# Patient Record
Sex: FEMALE | Race: WHITE | NOT HISPANIC OR LATINO | Employment: FULL TIME | URBAN - METROPOLITAN AREA
[De-identification: names, ages, dates, MRNs, and addresses within clinical notes are randomized per-mention and may not be internally consistent; named-entity substitution may affect disease eponyms.]

---

## 2017-10-13 ENCOUNTER — TRANSFERRED RECORDS (OUTPATIENT)
Dept: HEALTH INFORMATION MANAGEMENT | Facility: CLINIC | Age: 27
End: 2017-10-13

## 2017-10-13 LAB — PAP-ABSTRACT: NORMAL

## 2018-12-19 ENCOUNTER — TRANSFERRED RECORDS (OUTPATIENT)
Dept: HEALTH INFORMATION MANAGEMENT | Facility: CLINIC | Age: 28
End: 2018-12-19

## 2019-04-11 ENCOUNTER — OFFICE VISIT (OUTPATIENT)
Dept: FAMILY MEDICINE | Facility: CLINIC | Age: 29
End: 2019-04-11
Payer: COMMERCIAL

## 2019-04-11 VITALS
TEMPERATURE: 98.4 F | DIASTOLIC BLOOD PRESSURE: 65 MMHG | HEIGHT: 68 IN | HEART RATE: 68 BPM | SYSTOLIC BLOOD PRESSURE: 118 MMHG | OXYGEN SATURATION: 99 % | BODY MASS INDEX: 23.19 KG/M2 | WEIGHT: 153 LBS

## 2019-04-11 DIAGNOSIS — N91.2 AMENORRHEA: ICD-10-CM

## 2019-04-11 DIAGNOSIS — N92.6 MISSED MENSES: Primary | ICD-10-CM

## 2019-04-11 LAB — B-HCG SERPL-ACNC: ABNORMAL IU/L (ref 0–5)

## 2019-04-11 PROCEDURE — 36415 COLL VENOUS BLD VENIPUNCTURE: CPT | Performed by: FAMILY MEDICINE

## 2019-04-11 PROCEDURE — 99202 OFFICE O/P NEW SF 15 MIN: CPT | Performed by: FAMILY MEDICINE

## 2019-04-11 PROCEDURE — 84702 CHORIONIC GONADOTROPIN TEST: CPT | Performed by: FAMILY MEDICINE

## 2019-04-11 RX ORDER — OMEGA-3-ACID ETHYL ESTERS 1 G/1
2 CAPSULE, LIQUID FILLED ORAL
COMMUNITY
End: 2019-04-11

## 2019-04-11 RX ORDER — CHOLECALCIFEROL (VITAMIN D3) 50 MCG
1 TABLET ORAL DAILY
COMMUNITY
End: 2019-05-08

## 2019-04-11 RX ORDER — LEVOTHYROXINE SODIUM 50 UG/1
50 TABLET ORAL
COMMUNITY
Start: 2017-10-13 | End: 2019-09-23

## 2019-04-11 RX ORDER — LAMOTRIGINE 150 MG/1
100 TABLET ORAL
Status: ON HOLD | COMMUNITY
Start: 2013-07-19 | End: 2019-11-18

## 2019-04-11 RX ORDER — BUPROPION HYDROCHLORIDE 150 MG/1
TABLET ORAL
COMMUNITY
Start: 2013-07-19 | End: 2020-01-15

## 2019-04-11 RX ORDER — CLINDAMYCIN PHOSPHATE 10 MG/G
GEL TOPICAL
COMMUNITY
Start: 2017-09-01 | End: 2019-07-25

## 2019-04-11 RX ORDER — ALBUTEROL SULFATE 90 UG/1
2 AEROSOL, METERED RESPIRATORY (INHALATION)
COMMUNITY
Start: 2015-02-06 | End: 2020-03-11

## 2019-04-11 ASSESSMENT — MIFFLIN-ST. JEOR: SCORE: 1459.56

## 2019-04-11 NOTE — PROGRESS NOTES
"  SUBJECTIVE:   Mechelle Mata is a 28 year old female who presents to clinic today for the following   health issues:      Pregnancy Test  LMP: 2/8/19    She has had some spotting for 2 days   It was light, this was 3/26 and 3/27    Patient is having some cramping   Back pain   Nausea and bloating     This is first pregnancy   Patient is a behavioral health therapist       O: /65 (BP Location: Left arm, Patient Position: Sitting, Cuff Size: Adult Regular)   Pulse 68   Temp 98.4  F (36.9  C) (Oral)   Ht 1.715 m (5' 7.5\")   Wt 69.4 kg (153 lb)   LMP 02/08/2019 (Exact Date)   SpO2 99%   Breastfeeding? No   BMI 23.61 kg/m      No exam was done         ICD-10-CM    1. Missed menses N92.6 HCG Qual, Urine-  [facility] ([id number])   2. Amenorrhea N91.2 HCG quantitative pregnancy     Patient is concerned about dates of pregnancy and so would like quantitative test for hcg done     She has had some spotting for two days and none since.  Seems like this is not a concern     I will release labs to her tomorrow when they come.      She has concerns about taking fish oil during pregnancy and I told her I did not know if this was recommended     She asked about vitamin d and I told her this was ok as well as tylenol         Reviewed  and updated as needed this visit by clinical staff       Reviewed and updated as needed this visit by Provider                 "

## 2019-04-12 NOTE — RESULT ENCOUNTER NOTE
Result show that you are pregnant and probably between 9 and 12 weeks, but these numbers vary from individual to individual and do not necessarily correlate well with the week of pregnancy exactly.

## 2019-04-19 ENCOUNTER — HEALTH MAINTENANCE LETTER (OUTPATIENT)
Age: 29
End: 2019-04-19

## 2019-04-25 ENCOUNTER — PRENATAL OFFICE VISIT (OUTPATIENT)
Dept: NURSING | Facility: CLINIC | Age: 29
End: 2019-04-25
Payer: COMMERCIAL

## 2019-04-25 VITALS
HEART RATE: 68 BPM | WEIGHT: 156 LBS | HEIGHT: 68 IN | SYSTOLIC BLOOD PRESSURE: 120 MMHG | BODY MASS INDEX: 23.64 KG/M2 | DIASTOLIC BLOOD PRESSURE: 74 MMHG

## 2019-04-25 DIAGNOSIS — Z34.90 SUPERVISION OF NORMAL PREGNANCY: Primary | ICD-10-CM

## 2019-04-25 DIAGNOSIS — Z34.00 SUPERVISION OF NORMAL FIRST PREGNANCY: ICD-10-CM

## 2019-04-25 LAB
ABO + RH BLD: NORMAL
ABO + RH BLD: NORMAL
ALBUMIN UR-MCNC: NEGATIVE MG/DL
APPEARANCE UR: CLEAR
BILIRUB UR QL STRIP: NEGATIVE
BLD GP AB SCN SERPL QL: NORMAL
BLOOD BANK CMNT PATIENT-IMP: NORMAL
COLOR UR AUTO: YELLOW
ERYTHROCYTE [DISTWIDTH] IN BLOOD BY AUTOMATED COUNT: 13.5 % (ref 10–15)
GLUCOSE UR STRIP-MCNC: NEGATIVE MG/DL
HCG SERPL QL: POSITIVE
HCT VFR BLD AUTO: 37.1 % (ref 35–47)
HGB BLD-MCNC: 12.5 G/DL (ref 11.7–15.7)
HGB UR QL STRIP: NEGATIVE
KETONES UR STRIP-MCNC: NEGATIVE MG/DL
LEUKOCYTE ESTERASE UR QL STRIP: NEGATIVE
MCH RBC QN AUTO: 31.4 PG (ref 26.5–33)
MCHC RBC AUTO-ENTMCNC: 33.7 G/DL (ref 31.5–36.5)
MCV RBC AUTO: 93 FL (ref 78–100)
NITRATE UR QL: NEGATIVE
PH UR STRIP: 6.5 PH (ref 5–7)
PLATELET # BLD AUTO: 168 10E9/L (ref 150–450)
RBC # BLD AUTO: 3.98 10E12/L (ref 3.8–5.2)
SOURCE: NORMAL
SP GR UR STRIP: 1.01 (ref 1–1.03)
SPECIMEN EXP DATE BLD: NORMAL
UROBILINOGEN UR STRIP-ACNC: 0.2 EU/DL (ref 0.2–1)
WBC # BLD AUTO: 9.6 10E9/L (ref 4–11)

## 2019-04-25 PROCEDURE — 84702 CHORIONIC GONADOTROPIN TEST: CPT | Performed by: FAMILY MEDICINE

## 2019-04-25 PROCEDURE — 87086 URINE CULTURE/COLONY COUNT: CPT | Performed by: FAMILY MEDICINE

## 2019-04-25 PROCEDURE — 87340 HEPATITIS B SURFACE AG IA: CPT | Performed by: FAMILY MEDICINE

## 2019-04-25 PROCEDURE — 81003 URINALYSIS AUTO W/O SCOPE: CPT | Performed by: FAMILY MEDICINE

## 2019-04-25 PROCEDURE — 86900 BLOOD TYPING SEROLOGIC ABO: CPT | Performed by: FAMILY MEDICINE

## 2019-04-25 PROCEDURE — 86901 BLOOD TYPING SEROLOGIC RH(D): CPT | Performed by: FAMILY MEDICINE

## 2019-04-25 PROCEDURE — 0064U ANTB TP TOTAL&RPR IA QUAL: CPT | Performed by: FAMILY MEDICINE

## 2019-04-25 PROCEDURE — 87389 HIV-1 AG W/HIV-1&-2 AB AG IA: CPT | Performed by: FAMILY MEDICINE

## 2019-04-25 PROCEDURE — 99207 ZZC NO CHARGE NURSE ONLY: CPT

## 2019-04-25 PROCEDURE — 86850 RBC ANTIBODY SCREEN: CPT | Performed by: FAMILY MEDICINE

## 2019-04-25 PROCEDURE — 84703 CHORIONIC GONADOTROPIN ASSAY: CPT | Performed by: FAMILY MEDICINE

## 2019-04-25 PROCEDURE — 86762 RUBELLA ANTIBODY: CPT | Performed by: FAMILY MEDICINE

## 2019-04-25 PROCEDURE — 85027 COMPLETE CBC AUTOMATED: CPT | Performed by: FAMILY MEDICINE

## 2019-04-25 PROCEDURE — 36415 COLL VENOUS BLD VENIPUNCTURE: CPT | Performed by: FAMILY MEDICINE

## 2019-04-25 ASSESSMENT — MIFFLIN-ST. JEOR: SCORE: 1473.17

## 2019-04-25 NOTE — PROGRESS NOTES
Prenatal OB Questionnaire  Past Medical History  Diabetes   No  Hypertension   No  Heart Disease, mitral valve prolapse, or rheumatic fever?   No  An autoimmune disorder such as Lupus or Rheumatoid Arthritis?   No  Kidney Disease or Urinary Tract Infection?   Yes  Multiple a  Year  Epilepsy, seizures or spells?   No  Migraine headaches?   Yes  A stroke or loss of function or sensation?   No  Any other neurological problems?   No  Have you ever been treated for depression?  Yes on medication  Are you having problems with crying spells or loss of self-esteem?   No  Have you ever required psychiatric care?   No  Have you ever hepatitis, liver disease or jaundice?   No  Have you ever been treated for blood clots in your veins, deep venous thrombosis, inflammation in the veins, thrombosis, phlebitis, pulmonary embolism or varicosities?   No  Have you had excessive bleeding after surgery or dental work?   No  Do you bleed more than other women after a cut or scratch?   No  Do you have a history of anemia?   No  Have you ever been treated for thyroid problems or taken thyroid medication?  Yes hypothyroid  Do you have any other endocrine problems?  No  Have you ever been in a major accident or suffered serious trauma?   No  Within the last year, has anyone hit slapped, kicked or otherwise hurt you?  No  In the last year, has anyone forced you to have sex when you didn't want to?  No  Have you ever had a blood transfusion?   No  Would you refuse a blood transfusion if a doctor judged it to be medically necessary?   No  If you answered yes, would you rather die than have a blood transfusion?   No  If you answered yes, is this for [place] reasons?   No  Does anyone in your home smoke?   No  Do you use tobacco products?  No  Do you drink beer, wine, hard liquor?  No  Do you use any of the following: marijuana, speed, cocaine, heroine, hallucinogens, or other drugs?  No  Is your blood type Rh negative?   No  Have you ever had  abnormal antibodies in your blood?   Yes  Have you ever had asthma?   Yes exercise induced  Have you ever had tuberculosis?   No  Do you have any allergies to drugs or over-the-counter medications?   No    Allergies as of 4/25/2019:    Allergies as of 04/25/2019     (No Known Allergies)       Do you have any breast problems?   No  Have you ever ?   No  Have you had any gynecological surgical procedures such as cervical conization, a LEEP procedure, laser treatment, cryosurgery of the cervix, or a dilation and curettage, etc?  No  Have you had any other surgical procedures?  Yes ear tube, ganglian cyst wisdom teeth ear tubes  Have you been hospitalized for a nonsurgical reason excluding normal delivery?   No  Have you ever had any anesthetic complications?   No  Have you ever had an abnormal pap smear?   No  Do you have a history of abnormalities of the uterus?   No  Did it take you more than one year to become pregnant?   No  Have you ever been evaluated or treated for infertility?   No  Is there a history of medical problems in your family, which you feel might adversely affect your health or pregnancy?   No  Do you have any other problems we have not asked you about which you feel may be important to this pregnancy?  No    Symptoms since Last Menstrual Period  Do you have any of the following:    *abdominal pain  No  *blood in stool or urine  No  *chest pain  No  *shortness of breath  No  *coughing or vomiting up blood No  *heart racing or skipping beats  No  *nausea and vomiting  yes  *pain with urination  No  *vaginal discharge or bleeding  No  Current medications are:  Current Outpatient Medications   Medication Sig Dispense Refill     albuterol (PROVENTIL HFA) 108 (90 Base) MCG/ACT inhaler Inhale 2 puffs into the lungs       buPROPion (WELLBUTRIN XL) 150 MG 24 hr tablet        Cholecalciferol (VITAMIN D3) 2000 units TABS Take 1 tablet by mouth daily       IRON PO Take 1 tablet by mouth        lamoTRIgine (LAMICTAL) 150 MG tablet Take 100 mg by mouth       levothyroxine (SYNTHROID/LEVOTHROID) 50 MCG tablet Take 50 mcg by mouth       Omega-3 Fatty Acids (FISH OIL PO)        Prenatal Vit-Fe Fumarate-FA (PRENATAL PO)        clindamycin (CLINDAMAX) 1 % external gel          Genetic Screening  At the time of birth, will you be 35 years old or older?  No  Has the patient, baby s father, or anyone in either family had:  Thalassemia (Italian, Greek, Mediterranean, or  background only) and an MCV result less than 80?  No  Neural tube defect such as meningomyelocele, spina bifida or anencephaly?  No  Congenital heart defect?  No  Down s syndrome?  No  Edison-Sach s disease (Latter-day, Cajun, Swedish-Mozambican)?  No  Sickle cell disease or trait ([place]ani)?  No  Hemophilia or other inherited problems of blood coagulation? No  Muscular dystrophy?  No  Cystic Fibrosis?  No  [place] s chorea?  No  Mental retardation/autism? No   If yes, was the person tested for fragile X?  No  Any other inherited genetic or chromosomal disorder?  No  Maternal metabolic disorder (e.g. insulin-dependent diabetes, PKU)? No  A child with birth defects not listed above?  No  Recurrent pregnancy loss or a stillbirth?  No  Has the patient had any medications/street drugs/alcohol since her last menstrual period? No  Does the patient or baby s father have any other genetic risks?  No  Infection History  Do you object to being tested for Hepatitis B? No  Do you object to being tested for HIV? No  Do you feel that you are at high risk for coming in contact with the AIDS virus?  No  Have you ever been treated for tuberculosis?  No  Have you ever received the BCG vaccine for tuberculosis?  No  Have you ever had a positive skin test for tuberculosis? No  Do you live with someone who has tuberculosis?  No  Have you ever been exposed to tuberculosis?  No  Do you have genital herpes?  No  Does your partner have genital herpes?  No  Have you had a rash  or viral illness since your last period?  No  Have you ever had Gonorrhea, Chlamydia, Syphilis, venereal warts, trichomoniasis, pelvic inflammatory disease or any other sexually transmitted disease?  No  Do you know if you are a genital group B streptococcus carrier? No  You have not had chicken pox/varicella  Yes  Have you been vaccinated against chicken pox?  Yes  Have you had any other infectious disease? No        Early ultrasound screening tool:    Does patient have irregular periods?  No  Did patient use hormonal birth control in the three months prior to positive urine pregnancy test? No  Is the patient breastfeeding?  No  Is the patient 10 weeks or greater at time of education visit?  No    Us ordered unsure of period.

## 2019-04-25 NOTE — PATIENT INSTRUCTIONS
Call [phone] to schedule your ultrasound in the next 1-2 weeks.

## 2019-04-26 ENCOUNTER — TELEPHONE (OUTPATIENT)
Dept: OBGYN | Facility: CLINIC | Age: 29
End: 2019-04-26

## 2019-04-26 DIAGNOSIS — Z34.90 SUPERVISION OF NORMAL PREGNANCY: Primary | ICD-10-CM

## 2019-04-26 LAB
B-HCG SERPL-ACNC: ABNORMAL IU/L (ref 0–5)
BACTERIA SPEC CULT: NORMAL
HBV SURFACE AG SERPL QL IA: NONREACTIVE
HIV 1+2 AB+HIV1 P24 AG SERPL QL IA: NONREACTIVE
RUBV IGG SERPL IA-ACNC: 7 IU/ML
SPECIMEN SOURCE: NORMAL
T PALLIDUM AB SER QL: NONREACTIVE

## 2019-04-26 NOTE — TELEPHONE ENCOUNTER
Patient calling to get her hcg quant result from yesterday. Discussed with pt that an hcg qualitative was done and not quant. Informed pt that I would contact the lab and call her back.   Discussed with our lab. The blood was drawn at [facility]. Called their lab. They said that they think it can be added on but not promising.   TC to patient. Discussed that lab should be able to add it on. Won't have results probably until Mon. Pt will call back then or Tues.   Vero Fam, CELE-BSN

## 2019-05-03 ENCOUNTER — PRENATAL OFFICE VISIT (OUTPATIENT)
Dept: OBGYN | Facility: CLINIC | Age: 29
End: 2019-05-03
Payer: COMMERCIAL

## 2019-05-03 VITALS
BODY MASS INDEX: 24.23 KG/M2 | WEIGHT: 157 LBS | HEART RATE: 72 BPM | DIASTOLIC BLOOD PRESSURE: 68 MMHG | TEMPERATURE: 98.5 F | SYSTOLIC BLOOD PRESSURE: 126 MMHG

## 2019-05-03 DIAGNOSIS — Z34.01 ENCOUNTER FOR SUPERVISION OF NORMAL FIRST PREGNANCY IN FIRST TRIMESTER: Primary | ICD-10-CM

## 2019-05-03 LAB — TSH SERPL DL<=0.005 MIU/L-ACNC: 1.73 MU/L (ref 0.4–4)

## 2019-05-03 PROCEDURE — 99207 ZZC FIRST OB VISIT: CPT | Performed by: OBSTETRICS & GYNECOLOGY

## 2019-05-03 PROCEDURE — 86765 RUBEOLA ANTIBODY: CPT | Performed by: OBSTETRICS & GYNECOLOGY

## 2019-05-03 PROCEDURE — 84443 ASSAY THYROID STIM HORMONE: CPT | Performed by: OBSTETRICS & GYNECOLOGY

## 2019-05-03 PROCEDURE — 36415 COLL VENOUS BLD VENIPUNCTURE: CPT | Performed by: OBSTETRICS & GYNECOLOGY

## 2019-05-03 PROCEDURE — 82306 VITAMIN D 25 HYDROXY: CPT | Performed by: OBSTETRICS & GYNECOLOGY

## 2019-05-03 RX ORDER — CEPHALEXIN 500 MG/1
500 CAPSULE ORAL 4 TIMES DAILY
Qty: 20 CAPSULE | Refills: 0 | Status: SHIPPED | OUTPATIENT
Start: 2019-05-03 | End: 2019-07-25

## 2019-05-03 NOTE — PROGRESS NOTES
OB - New OB History and Physical  Date of visit: 5/3/2019  Chief Complaint: To establish prenatal care    HPI: Mechelle Mata is a 28 year old  at 12w0d as dated by LMP. Estimated Date of Delivery: Nov 15, 2019     Since becoming pregnant, patient reports feeling tired and nauseated.      Menses irregular coming off birth control. Stopped OCPs mid 2018  Patient's last menstrual period was 2019 (exact date).   3/20 UPT negative  Thinks estimated date of conception was 3/19.    Hypothyroidism. On levothyroxine 50mcg. Last one was 3/20/19    Vitamin D  Frequent UTI, requests rx for abx in case sx while traveling    Ultrasound: not yet    Obstetric history:     OB History    Para Term  AB Living   1 0 0 0 0 0   SAB TAB Ectopic Multiple Live Births   0 0 0 0 0      # Outcome Date GA Lbr [place]/2nd Weight Sex Delivery Anes PTL Lv   1 Current                Gynecologic History:   Menses irregular  Patient's last menstrual period was 2019 (exact date).   STI history: none  Last Pap: 10/13/17 NIL  History of abnormal pap: none        Allergy: Patient has no known allergies.  Patient denies food, latex or environmental allergies.     Current Medications:  Current Outpatient Medications   Medication     albuterol (PROVENTIL HFA) 108 (90 Base) MCG/ACT inhaler     buPROPion (WELLBUTRIN XL) 150 MG 24 hr tablet     cephALEXin (KEFLEX) 500 MG capsule     Cholecalciferol (VITAMIN D3) 2000 units TABS     clindamycin (CLINDAMAX) 1 % external gel     IRON PO     lamoTRIgine (LAMICTAL) 150 MG tablet     levothyroxine (SYNTHROID/LEVOTHROID) 50 MCG tablet     Omega-3 Fatty Acids (FISH OIL PO)     Prenatal Vit-Fe Fumarate-FA (PRENATAL PO)     No current facility-administered medications for this visit.        Past Medical History:  Past Medical History:   Diagnosis Date     Asthma     exercise induced     Hypothyroidism        Past Surgical History:  Past Surgical History:   Procedure Laterality Date     C  EXCISION OF GANGLION CYST FOREARM Left 2006    wrist     wisdom teeth         Social History:  Patient lives with Brett.  Patient's relationship status is: .    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   Denies current tobacco, alcohol or recreational drug use.      Family History:  Family History   Problem Relation Age of Onset     Mental Illness Mother      Depression Mother      Mental Illness Father      Depression Father      Heart Failure Paternal Grandfather    mother with hip dysplasia    Review of Systems  Gen:  no change in weight, no fever, no chills, no fatigue  CV: no palpitations, no chest pain, no hypertension, no syncope  Resp: no shortness of breath, no cough, no wheezing, no asthma  GI: + nausea, no vomiting, no diarrhea, no constipation, no bloating, no GERD  :  no vaginal discharge, no dysuria, no abnormal bleeding, no pelvic pain   Endo: no thyroid problems, no cold/heat intolerance, no acne, no hirsutism, no diabetes  Heme: no easy bruising or bleeding, no history of DVT/PE/CVA  Neuro: no headaches, no seizures, no strokes, no focal deficits      Physical Exam:  Vitals:    05/03/19 1503   BP: 126/68   Pulse: 72   Temp: 98.5  F (36.9  C)   TempSrc: Oral   Weight: 71.2 kg (157 lb)     Body mass index is 24.23 kg/m .  Gen: alert, oriented, no distress, very pleasant, appears stated age, well groomed  Neck: supple, trachea midline, no thyromegaly, no lymphadenopathy  HEENT: head normocephalic, atraumatic, normal oropharynx without erythema or exudates  CV: normal heart sounds, regular rate and rhythm, no murmurs  Resp: good inspiratory effort, lungs clear to ascultation bilaterally, no wheezes or rhonchi  Abd: soft, nontender, nondistended, normoactive bowel sounds,  no masses or organomegaly, no hernias, no scars  : normal external genitalia with lesions or erythema; normal, well supported urethra, normal Bartholins, normal Skenes; normal pink rugated  vaginal mucosa, no lesions or abnormal discharge; medium speculum inserted without difficulty; normal appearing cervix without lesions, bleeding or discharge. Bimanual exam shows 8 week sized uterus, mobile, no fundal tenderness, no CMT, no adnexal masses or tenderness. Cervix long and closed  Extr: warm, well perfused, nontender, no edema  Psych: affect bright, cooperative, responds appropriately    BSUS: difficult to clearly visualize via trans abdominal ultrasound. Gestational sac and small fetal pole with cardic motion visualized.     Assessment:  Mechelle Mata is a 28 year old  at 12w0d presenting to establish prenatal care.    Problem List:   Rubella non-immune  Uncertain dating    Plan:  1. Uncertain dating: US scheduled  2. Reviewed routine prenatal care. Discussed MD call schedule as well as role of residents and med students both in clinic and hospital.  She is okay  with resident care  3. Pap: up to date, had one at [facility] 10/13/17 NIL   4. Diet, Nutrition and Exercise:  Continue PNVs. Continue normal exercise. Her prepregnancy BMI is 24.  According to the WHO guidelines, patient is given a goal of gaining approximately 25-35 pounds during the course of her pregnancy.    5. Immunizations: plan TdaP at 28 weeks  6. Fetal anomaly screening: discussed aneuploidy screening, considering options.   7. Routine Prenatal Care: the patient will return to clinic in 4 weeks and prn    Zuri Cisneros MD

## 2019-05-04 LAB — MEV IGG SER QL IA: 1.8 AI (ref 0–0.8)

## 2019-05-06 LAB — DEPRECATED CALCIDIOL+CALCIFEROL SERPL-MC: 26 UG/L (ref 20–75)

## 2019-05-08 ENCOUNTER — PRENATAL OFFICE VISIT (OUTPATIENT)
Dept: OBGYN | Facility: CLINIC | Age: 29
End: 2019-05-08
Payer: COMMERCIAL

## 2019-05-08 ENCOUNTER — ANCILLARY PROCEDURE (OUTPATIENT)
Dept: ULTRASOUND IMAGING | Facility: CLINIC | Age: 29
End: 2019-05-08
Payer: COMMERCIAL

## 2019-05-08 VITALS
HEIGHT: 68 IN | HEART RATE: 59 BPM | WEIGHT: 156.2 LBS | DIASTOLIC BLOOD PRESSURE: 64 MMHG | BODY MASS INDEX: 23.67 KG/M2 | SYSTOLIC BLOOD PRESSURE: 113 MMHG

## 2019-05-08 DIAGNOSIS — Z34.90 SUPERVISION OF NORMAL PREGNANCY: ICD-10-CM

## 2019-05-08 DIAGNOSIS — Z34.01 NORMAL FIRST PREGNANCY IN FIRST TRIMESTER: Primary | ICD-10-CM

## 2019-05-08 PROCEDURE — 76817 TRANSVAGINAL US OBSTETRIC: CPT | Performed by: OBSTETRICS & GYNECOLOGY

## 2019-05-08 PROCEDURE — 99207 ZZC PRENATAL VISIT: CPT | Performed by: OBSTETRICS & GYNECOLOGY

## 2019-05-08 RX ORDER — FAMOTIDINE 20 MG
2000 TABLET ORAL
COMMUNITY
End: 2021-07-14

## 2019-05-08 ASSESSMENT — MIFFLIN-ST. JEOR: SCORE: 1474.08

## 2019-05-08 ASSESSMENT — PATIENT HEALTH QUESTIONNAIRE - PHQ9: SUM OF ALL RESPONSES TO PHQ QUESTIONS 1-9: 2

## 2019-05-29 ENCOUNTER — PRE VISIT (OUTPATIENT)
Dept: MATERNAL FETAL MEDICINE | Facility: CLINIC | Age: 29
End: 2019-05-29

## 2019-05-30 ENCOUNTER — PRENATAL OFFICE VISIT (OUTPATIENT)
Dept: OBGYN | Facility: CLINIC | Age: 29
End: 2019-05-30
Payer: COMMERCIAL

## 2019-05-30 VITALS
WEIGHT: 158 LBS | DIASTOLIC BLOOD PRESSURE: 64 MMHG | HEIGHT: 68 IN | BODY MASS INDEX: 23.95 KG/M2 | SYSTOLIC BLOOD PRESSURE: 102 MMHG

## 2019-05-30 DIAGNOSIS — R30.0 DYSURIA: ICD-10-CM

## 2019-05-30 DIAGNOSIS — Z34.01 ENCOUNTER FOR SUPERVISION OF NORMAL FIRST PREGNANCY IN FIRST TRIMESTER: Primary | ICD-10-CM

## 2019-05-30 PROCEDURE — 87086 URINE CULTURE/COLONY COUNT: CPT | Performed by: OBSTETRICS & GYNECOLOGY

## 2019-05-30 PROCEDURE — 99207 ZZC PRENATAL VISIT: CPT | Performed by: OBSTETRICS & GYNECOLOGY

## 2019-05-30 ASSESSMENT — MIFFLIN-ST. JEOR: SCORE: 1482.24

## 2019-05-30 NOTE — PROGRESS NOTES
Has had lower abdominal discomfort, interferes with activity/exercise, abdomen is soft and non-tender, she continues daily Colace.  Pain sounds muscular, discussed pregnancy-related discomforts, heat/tylenol.  Has ultrasound (doesn't want to know gender) and genetic screening scheduled.  History of UTI, will send U/C.  RTC 4 weeks.  AM

## 2019-05-31 ENCOUNTER — OFFICE VISIT (OUTPATIENT)
Dept: MATERNAL FETAL MEDICINE | Facility: CLINIC | Age: 29
End: 2019-05-31
Attending: OBSTETRICS & GYNECOLOGY
Payer: COMMERCIAL

## 2019-05-31 ENCOUNTER — HOSPITAL ENCOUNTER (OUTPATIENT)
Dept: ULTRASOUND IMAGING | Facility: CLINIC | Age: 29
Discharge: HOME | End: 2019-05-31
Attending: OBSTETRICS & GYNECOLOGY | Admitting: OBSTETRICS & GYNECOLOGY
Payer: COMMERCIAL

## 2019-05-31 DIAGNOSIS — O26.90 PREGNANCY RELATED CONDITION, ANTEPARTUM: ICD-10-CM

## 2019-05-31 DIAGNOSIS — Z36.9 FIRST TRIMESTER SCREENING: ICD-10-CM

## 2019-05-31 DIAGNOSIS — Z36.82 NUCHAL TRANSLUCENCY OF FETUS ON PRENATAL ULTRASOUND: Primary | ICD-10-CM

## 2019-05-31 DIAGNOSIS — Z31.430 ENCOUNTER OF FEMALE FOR TESTING FOR GENETIC DISEASE CARRIER STATUS FOR PROCREATIVE MANAGEMENT: Primary | ICD-10-CM

## 2019-05-31 LAB
BACTERIA SPEC CULT: NORMAL
BACTERIA SPEC CULT: NORMAL
SPECIMEN SOURCE: NORMAL

## 2019-05-31 PROCEDURE — 84704 HCG FREE BETACHAIN TEST: CPT | Performed by: OBSTETRICS & GYNECOLOGY

## 2019-05-31 PROCEDURE — 40000954 ZZHCL STATISTIC COUNSYL FAMILY PREP: Performed by: OBSTETRICS & GYNECOLOGY

## 2019-05-31 PROCEDURE — 36415 COLL VENOUS BLD VENIPUNCTURE: CPT | Performed by: OBSTETRICS & GYNECOLOGY

## 2019-05-31 PROCEDURE — 96040 ZZH GENETIC COUNSELING, EACH 30 MINUTES: CPT | Mod: ZF | Performed by: GENETIC COUNSELOR, MS

## 2019-05-31 PROCEDURE — 82105 ALPHA-FETOPROTEIN SERUM: CPT | Performed by: OBSTETRICS & GYNECOLOGY

## 2019-05-31 PROCEDURE — 76813 OB US NUCHAL MEAS 1 GEST: CPT

## 2019-05-31 PROCEDURE — 84163 PAPPA SERUM: CPT | Performed by: OBSTETRICS & GYNECOLOGY

## 2019-05-31 NOTE — PROGRESS NOTES
Please see full imaging report from [facility] program under imaging tab.    Ying Ruiz MD  Maternal Fetal Medicine

## 2019-05-31 NOTE — PROGRESS NOTES
[facility]  Genetic Counseling Consult    Patient: Mechelle Mata YOB: 1990   Date of Service: 19      Mechelle Mata was seen at [facility] for genetic consultation to discuss the options for routine screening for fetal chromosome abnormalities. She was accompanied to today's visit by her partner, Brett.      Impression/Plan:   1.  Mechelle had an ultrasound and blood draw for first trimester screening.  Results are expected within 5-7 days, and will be available in EPIC.  We will contact her to discuss the results, and a copy will be forwarded to the office of the referring OB provider.    2. Mechelle and her partner, Brett, opted to pursue [place] Carrier Screening through [email or web address]. Results of this testing will be available in 2-3 weeks.    3. Maternal serum AFP (single marker screen) is recommended after 15 weeks to screen for open neural tube defects. A quad screen should not be performed.    Pregnancy History:   /Parity:    Age at Delivery: 29 year old  KADEN: 2019, by Ultrasound  Gestational Age: 12w6d    No significant complications or exposures were reported in the current pregnancy.    Mechelle reports medication use, all of which have been reviewed with her medical provider.    Medical History:   Mechelle s reported medical history is not expected to impact pregnancy management or risks to fetal development.       Family History:   A three-generation pedigree was obtained, and is scanned under the  Media  tab.   The following significant findings were reported by Mechelle:    Mechelle's partner, Brett, has a family history of breast and colon cancer.    Otherwise, the reported family history is negative for multiple miscarriages, stillbirths, birth defects, mental retardation, known genetic conditions, and consanguinity.       Carrier Screening:   The patient reports that she and the father of the pregnancy  have  ancestry:      Cystic fibrosis is an autosomal recessive genetic condition that occurs with increased frequency in individuals of  ancestry and carrier screening for this condition is available.  In addition,  screening in the [facility] includes cystic fibrosis.      Expanded carrier screening for mutations in a large panel of genes associated with autosomal recessive conditions including cystic fibrosis, spinal muscular atrophy, and others, is now available.      The patient (kit #[id number]) and her partner, Pranay (kit #[id number]), elected to pursue carrier screening today. Their blood was drawn for [employer] [place] Carrier Screening and sent to [email or web address].  We will contact her when these test results become available, and a copy of these results will be relayed to her primary OB provider.       Risk Assessment for Chromosome Conditions:   We explained that the risk for fetal chromosome abnormalities increases with maternal age. We discussed specific features of common chromosome abnormalities, including Down syndrome, trisomy 13, trisomy 18, and sex chromosome trisomies.      - At age 29 at midtrimester, the risk to have a baby with Down syndrome is 1 in 690.     - At age 29 at midtrimester, the risk to have a baby with any chromosome abnormality is 1 in 345.     Testing Options:   We discussed the following options:   First trimester screening    First trimester ultrasound with nuchal translucency and nasal bone assessments, maternal plasma hCG, SOWMYA-A, and AFP measurement    Screens for fetal trisomy 21, trisomy 13, and trisomy 18    Cannot screen for open neural tube defects; maternal serum AFP after 15 weeks is recommended     Non-invasive Prenatal Testing (NIPT)    Maternal plasma cell-free DNA testing; first trimester ultrasound with nuchal translucency and nasal bone assessment is recommended, when appropriate    Screens for fetal trisomy  21, trisomy 13, trisomy 18, and sex chromosome aneuploidy    Cannot screen for open neural tube defects; maternal serum AFP after 15 weeks is recommended      We reviewed the benefits and limitations of this testing.  Screening tests provide a risk assessment specific to the pregnancy for certain fetal chromosome abnormalities, but cannot definitively diagnose or exclude a fetal chromosome abnormality.  Follow-up genetic counseling and consideration of diagnostic testing is recommended with any abnormal screening result.      It was a pleasure to be involved with [facility] care. Face-to-face time of the meeting was 45 minutes.    Aby Dotson MS, [facility]  Maternal Fetal Medicine  [facility]  Ph: [phone]  [email or web address]

## 2019-06-05 LAB — LAB SCANNED RESULT: NORMAL

## 2019-06-10 ENCOUNTER — TELEPHONE (OUTPATIENT)
Dept: MATERNAL FETAL MEDICINE | Facility: CLINIC | Age: 29
End: 2019-06-10

## 2019-06-10 NOTE — TELEPHONE ENCOUNTER
I spoke with Mechelle today regarding her normal first trimester screen results. Specifically, the chance this pregnancy has Down syndrome was reduced from her age related risk of 1 in 671 to less than 1 in 10,000. Similarly, the chance this pregnancy has trisomy 18/trisomy 13 was reduced from her age related risk of 1 in 1,277 to less than 1 in 10,000. This result significantly reduces the chance this pregnancy has Down syndrome, trisomy 18, or trisomy 13.     We discussed that these results are very reassuring, but there remains a small chance for a false positive or false negative result. This screen also does not address all chromosome abnormalities or all causes of birth defects and cognitive delay. As such, Mechelle will still have the option of the Innatal screen and/or diagnostic testing (CVS or amniocentesis) if she is interested or there is an indication later in the pregnancy. Mechelle declines additional testing or screening at this time. She also has the option of having a maternal serum AFP blood draw after 15 weeks to screen for ONTD; she is encouraged to discuss this option with her primary OB provider. Mechelle is also scheduled to return for a comprehensive ultrasound on 7/9.     All of Mechelle's questions were answered to her satisfaction and I encouraged her to contact me if she has any questions in the future. A copy of this note and her first trimester screen results will be forwarded to her primary OB provider.    Mechelle and Pranay had originally consented to universal carrier screening however, they have opted to cancel these due to out-of-pocket cost.     Aby Dotson MS, [facility]  Maternal Fetal Medicine  [facility]  Ph: [phone]  [email or web address]

## 2019-06-13 LAB — LAB SCANNED RESULT: NORMAL

## 2019-06-28 ENCOUNTER — PRENATAL OFFICE VISIT (OUTPATIENT)
Dept: OBGYN | Facility: CLINIC | Age: 29
End: 2019-06-28
Payer: COMMERCIAL

## 2019-06-28 VITALS
DIASTOLIC BLOOD PRESSURE: 69 MMHG | WEIGHT: 162.2 LBS | BODY MASS INDEX: 25.03 KG/M2 | SYSTOLIC BLOOD PRESSURE: 118 MMHG | HEART RATE: 89 BPM

## 2019-06-28 DIAGNOSIS — O21.9 NAUSEA/VOMITING IN PREGNANCY: ICD-10-CM

## 2019-06-28 DIAGNOSIS — E03.9 HYPOTHYROIDISM, UNSPECIFIED TYPE: ICD-10-CM

## 2019-06-28 DIAGNOSIS — Z34.02 ENCOUNTER FOR SUPERVISION OF NORMAL FIRST PREGNANCY IN SECOND TRIMESTER: Primary | ICD-10-CM

## 2019-06-28 LAB
T4 FREE SERPL-MCNC: 0.91 NG/DL (ref 0.76–1.46)
TSH SERPL DL<=0.005 MIU/L-ACNC: 2.28 MU/L (ref 0.4–4)

## 2019-06-28 PROCEDURE — 99207 ZZC PRENATAL VISIT: CPT | Performed by: OBSTETRICS & GYNECOLOGY

## 2019-06-28 PROCEDURE — 99000 SPECIMEN HANDLING OFFICE-LAB: CPT | Performed by: OBSTETRICS & GYNECOLOGY

## 2019-06-28 PROCEDURE — 82105 ALPHA-FETOPROTEIN SERUM: CPT | Mod: 90 | Performed by: OBSTETRICS & GYNECOLOGY

## 2019-06-28 PROCEDURE — 36415 COLL VENOUS BLD VENIPUNCTURE: CPT | Performed by: OBSTETRICS & GYNECOLOGY

## 2019-06-28 PROCEDURE — 84443 ASSAY THYROID STIM HORMONE: CPT | Performed by: OBSTETRICS & GYNECOLOGY

## 2019-06-28 PROCEDURE — 84439 ASSAY OF FREE THYROXINE: CPT | Performed by: OBSTETRICS & GYNECOLOGY

## 2019-06-28 RX ORDER — ONDANSETRON 4 MG/1
4 TABLET, FILM COATED ORAL EVERY 6 HOURS PRN
Qty: 30 TABLET | Refills: 1 | Status: SHIPPED | OUTPATIENT
Start: 2019-06-28 | End: 2019-11-01

## 2019-06-28 NOTE — PROGRESS NOTES
16w6d  Doing well. Nausea still there, rx for zofran.  Several questions today, answered. Somewhat anxious.  msAFP today, will check TSH/free T4. Fetal survey scheduled.   RTC 4 weeks, sooner PRN.  Zuri Cisneros MD

## 2019-07-03 LAB
# FETUSES US: NORMAL
# FETUSES: NORMAL
AFP ADJ MOM AMN: 0.95
AFP SERPL-MCNC: 33 NG/ML
AGE - REPORTED: 30.5 YR
CURRENT SMOKER: NO
CURRENT SMOKER: NO
DIABETES STATUS PATIENT: NO
FAMILY MEMBER DISEASES HX: NO
FAMILY MEMBER DISEASES HX: NO
GA METHOD: NORMAL
GA METHOD: NORMAL
GA: NORMAL WK
IDDM PATIENT QL: NO
INTEGRATED SCN PATIENT-IMP: NORMAL
LMP START DATE: NORMAL
MONOCHORIONIC TWINS: NO
SERVICE CMNT-IMP: NO
SPECIMEN DRAWN SERPL: NORMAL
VALPROIC/CARBAMAZEPINE STATUS: NO
WEIGHT UNITS: NORMAL

## 2019-07-09 ENCOUNTER — HOSPITAL ENCOUNTER (OUTPATIENT)
Dept: ULTRASOUND IMAGING | Facility: CLINIC | Age: 29
Discharge: HOME | End: 2019-07-09
Attending: OBSTETRICS & GYNECOLOGY | Admitting: OBSTETRICS & GYNECOLOGY
Payer: COMMERCIAL

## 2019-07-09 ENCOUNTER — OFFICE VISIT (OUTPATIENT)
Dept: MATERNAL FETAL MEDICINE | Facility: CLINIC | Age: 29
End: 2019-07-09
Attending: OBSTETRICS & GYNECOLOGY
Payer: COMMERCIAL

## 2019-07-09 DIAGNOSIS — O26.90 PREGNANCY RELATED CONDITION, ANTEPARTUM: ICD-10-CM

## 2019-07-09 DIAGNOSIS — Z36.3 SCREENING, ANTENATAL, FOR MALFORMATION BY ULTRASOUND: ICD-10-CM

## 2019-07-09 DIAGNOSIS — O35.9XX0 SUSPECTED FETAL ANOMALY, ANTEPARTUM, SINGLE OR UNSPECIFIED FETUS: Primary | ICD-10-CM

## 2019-07-09 PROCEDURE — 76811 OB US DETAILED SNGL FETUS: CPT

## 2019-07-09 NOTE — PROGRESS NOTES
Please refer to ultrasound report under 'Imaging' Studies of 'Chart Review' tabs.    Marciano Grullon M.D.

## 2019-07-25 ENCOUNTER — PRENATAL OFFICE VISIT (OUTPATIENT)
Dept: OBGYN | Facility: CLINIC | Age: 29
End: 2019-07-25
Payer: COMMERCIAL

## 2019-07-25 VITALS
HEART RATE: 58 BPM | DIASTOLIC BLOOD PRESSURE: 67 MMHG | WEIGHT: 167.4 LBS | BODY MASS INDEX: 25.83 KG/M2 | SYSTOLIC BLOOD PRESSURE: 117 MMHG

## 2019-07-25 DIAGNOSIS — L70.9 ACNE, UNSPECIFIED ACNE TYPE: ICD-10-CM

## 2019-07-25 DIAGNOSIS — Z34.02 ENCOUNTER FOR SUPERVISION OF NORMAL FIRST PREGNANCY IN SECOND TRIMESTER: Primary | ICD-10-CM

## 2019-07-25 PROCEDURE — 99207 ZZC PRENATAL VISIT: CPT | Performed by: OBSTETRICS & GYNECOLOGY

## 2019-07-25 RX ORDER — BREAST PUMP
1 EACH MISCELLANEOUS ONCE
Qty: 1 EACH | Refills: 0 | Status: SHIPPED | OUTPATIENT
Start: 2019-07-25 | End: 2019-07-25

## 2019-07-25 RX ORDER — CLINDAMYCIN PHOSPHATE 10 MG/G
GEL TOPICAL 2 TIMES DAILY
Qty: 60 G | Refills: 3 | Status: SHIPPED | OUTPATIENT
Start: 2019-07-25 | End: 2020-04-09

## 2019-07-25 NOTE — PROGRESS NOTES
20w5d  Stopped taking fish oil, feeling better. Nausea improved.   +fetal movement   No pain or bleeding.   Discussed exercise and weight gain, on track.   Breast pump rx written.   Fetal survey: anterior placenta, no previa, 3vc. Normal fetal anatomy on detailed review. Suboptimal views of fetal spine and genitalia due to fetal position. Recommend repeat growth scan in 4 weeks to re-evaluate fetal spine and genitalia.  RTC 4 weeks, sooner PRN. Will check TSH next appt.  Zuri Cisneros MD

## 2019-08-07 ENCOUNTER — OFFICE VISIT (OUTPATIENT)
Dept: MATERNAL FETAL MEDICINE | Facility: CLINIC | Age: 29
End: 2019-08-07
Attending: OBSTETRICS & GYNECOLOGY
Payer: COMMERCIAL

## 2019-08-07 ENCOUNTER — HOSPITAL ENCOUNTER (OUTPATIENT)
Dept: ULTRASOUND IMAGING | Facility: CLINIC | Age: 29
Discharge: HOME | End: 2019-08-07
Attending: OBSTETRICS & GYNECOLOGY | Admitting: OBSTETRICS & GYNECOLOGY
Payer: COMMERCIAL

## 2019-08-07 DIAGNOSIS — O35.9XX0 SUSPECTED FETAL ANOMALY, ANTEPARTUM, SINGLE OR UNSPECIFIED FETUS: ICD-10-CM

## 2019-08-07 PROCEDURE — 76817 TRANSVAGINAL US OBSTETRIC: CPT | Performed by: OBSTETRICS & GYNECOLOGY

## 2019-08-07 PROCEDURE — 76816 OB US FOLLOW-UP PER FETUS: CPT

## 2019-08-07 NOTE — PROGRESS NOTES
"Please see \"Imaging\" tab under \"Chart Review\" for details of today's US.    Mila Henao, DO    "

## 2019-08-20 ENCOUNTER — PRENATAL OFFICE VISIT (OUTPATIENT)
Dept: OBGYN | Facility: CLINIC | Age: 29
End: 2019-08-20
Payer: COMMERCIAL

## 2019-08-20 VITALS
WEIGHT: 174 LBS | SYSTOLIC BLOOD PRESSURE: 119 MMHG | HEART RATE: 71 BPM | DIASTOLIC BLOOD PRESSURE: 68 MMHG | BODY MASS INDEX: 26.85 KG/M2

## 2019-08-20 DIAGNOSIS — Z34.02 ENCOUNTER FOR SUPERVISION OF NORMAL FIRST PREGNANCY IN SECOND TRIMESTER: Primary | ICD-10-CM

## 2019-08-20 DIAGNOSIS — E03.9 HYPOTHYROIDISM, UNSPECIFIED TYPE: ICD-10-CM

## 2019-08-20 PROCEDURE — 84443 ASSAY THYROID STIM HORMONE: CPT | Performed by: OBSTETRICS & GYNECOLOGY

## 2019-08-20 PROCEDURE — 99207 ZZC PRENATAL VISIT: CPT | Performed by: OBSTETRICS & GYNECOLOGY

## 2019-08-20 NOTE — PROGRESS NOTES
24w3d  Active fetal movement. No contractions, no bleeding.   Round ligament pain.   1h gtt: 68, Hgb 14  Childbirth classes? YES, registered with [facility]  Plan on breastfeeding? Yes  Birthcontrol? oral contraceptive  Sex on ultrasound? surprise  Circumsion? not sure yet  Peds doc? Not sure yet, some [place] clinic    RTC 4 weeks, sooner PRN.    Zuri Cisneros MD

## 2019-08-21 LAB — TSH SERPL DL<=0.005 MIU/L-ACNC: 1.66 MU/L (ref 0.4–4)

## 2019-09-23 ENCOUNTER — PRENATAL OFFICE VISIT (OUTPATIENT)
Dept: OBGYN | Facility: CLINIC | Age: 29
End: 2019-09-23
Payer: COMMERCIAL

## 2019-09-23 VITALS
OXYGEN SATURATION: 98 % | WEIGHT: 178 LBS | TEMPERATURE: 97.6 F | DIASTOLIC BLOOD PRESSURE: 73 MMHG | SYSTOLIC BLOOD PRESSURE: 135 MMHG | HEART RATE: 74 BPM | BODY MASS INDEX: 27.47 KG/M2

## 2019-09-23 DIAGNOSIS — E03.9 HYPOTHYROIDISM, UNSPECIFIED TYPE: ICD-10-CM

## 2019-09-23 DIAGNOSIS — F32.A DEPRESSION, UNSPECIFIED DEPRESSION TYPE: ICD-10-CM

## 2019-09-23 DIAGNOSIS — Z34.03 ENCOUNTER FOR SUPERVISION OF NORMAL FIRST PREGNANCY IN THIRD TRIMESTER: Primary | ICD-10-CM

## 2019-09-23 DIAGNOSIS — Z23 NEED FOR TDAP VACCINATION: ICD-10-CM

## 2019-09-23 PROCEDURE — 90471 IMMUNIZATION ADMIN: CPT | Performed by: OBSTETRICS & GYNECOLOGY

## 2019-09-23 PROCEDURE — 99207 ZZC PRENATAL VISIT: CPT | Performed by: OBSTETRICS & GYNECOLOGY

## 2019-09-23 PROCEDURE — 90715 TDAP VACCINE 7 YRS/> IM: CPT | Performed by: OBSTETRICS & GYNECOLOGY

## 2019-09-23 RX ORDER — LEVOTHYROXINE SODIUM 50 UG/1
50 TABLET ORAL DAILY
Qty: 90 TABLET | Refills: 1 | Status: SHIPPED | OUTPATIENT
Start: 2019-09-23 | End: 2020-01-16

## 2019-09-23 RX ORDER — LAMOTRIGINE 100 MG/1
100 TABLET ORAL DAILY
Qty: 90 TABLET | Refills: 1 | Status: SHIPPED | OUTPATIENT
Start: 2019-09-23 | End: 2020-01-15

## 2019-09-23 NOTE — NURSING NOTE
Clinic Administered Medication Documentation    MEDICATION LIST:   Injectable Medication Documentation    Patient was given tdap. Prior to medication administration, verified patients identity using patient s name and date of birth. Please see MAR and medication order for additional information. Patient instructed to remain in clinic for 15 minutes.      Was entire vial of medication used? Yes  Vial/Syringe: Single dose vial  Expiration Date:  7/4/21  Was this medication supplied by the patient? No

## 2019-09-23 NOTE — Clinical Note
This patient is taking stable doses of welbutrin and lamictal. She is currently pregnant. Her psychiatrist is retiring and she is looking for a PCP to take over prescribing her psych meds. Is this something you could do? If not, any recommendations? Thanks! Zuri Cisneros MD

## 2019-09-23 NOTE — PROGRESS NOTES
29w2d  Needs refills of lamictal and levothryoxine bc they got lost in the transfer when [facility], rx written. Discussed that her psychiatrist is retiring and she needs a PCP to take over prescribing her psych meds. Wants to see if there are any providers here or at [place] who would be willing to take them over (lamictal and welbutrin). Doses have been stable. Does not necessarily want to see another psychiatrist. Will ask FP here if they would do this.   Active fetal movement. No leaking or bleeding. No contractions.   Concerns about ranitidine due to recall. Discussed taking famotidine instead.  Questions about fetal position, doulas, exercise, postpartum recovery.   TdaP today. Wants to do flu shot at work.  RTC 2 weeks, sooner PRN.  Zuri Cisneros MD

## 2019-09-24 ENCOUNTER — TELEPHONE (OUTPATIENT)
Dept: OBGYN | Facility: CLINIC | Age: 29
End: 2019-09-24

## 2019-09-24 NOTE — TELEPHONE ENCOUNTER
Forms received and filled out, signed by provider. Copy sent to HIM.   LVM for patient notifying her that paperwork is ready for  at .  Jennifer Redman,

## 2019-09-30 ENCOUNTER — NURSE TRIAGE (OUTPATIENT)
Dept: NURSING | Facility: CLINIC | Age: 29
End: 2019-09-30

## 2019-09-30 NOTE — TELEPHONE ENCOUNTER
Caller is 30 week pregnant; primip;  reports she has had constant  uterine tightening for past hour;  some fetal movement; no bleeding or  fluid leakage   Pain is mild , slight let up with repositioning   Triage protocol reviewed   Advised that   Provider will be paged to discuss this with  Her   On call or  RD OB paged via  @    to call patient at home @ [phone] m for secondary triage  Karina Mendes RN  FNA        Reason for Disposition    MODERATE-SEVERE abdominal pain    Additional Information    Negative: Passed out (i.e., lost consciousness, collapsed and was not responding)    Negative: Shock suspected (e.g., cold/pale/clammy skin, too weak to stand, low BP, rapid pulse)    Negative: Difficult to awaken or acting confused (e.g., disoriented, slurred speech)    Negative: [1] SEVERE abdominal pain (e.g., excruciating) AND [2] constant AND [3] present > 1 hour    Negative: SEVERE vaginal bleeding (e.g., continuous red blood from vagina, or large blood clots)    Negative: Sounds like a life-threatening emergency to the triager    [1] Having contractions or other symptoms of labor (such as vaginal pressure) AND [2] < 37 weeks pregnant (i.e., )    Negative: Contractions < 10 minutes apart for 1 hour (i.e., 6 or more contractions an hour)    Negative: [1] Contractions > 10 minutes apart AND [2] persist > 24 hours AND [3] no improvement using CARE ADVICE    Negative: [1] Contractions AND [2] any vaginal bleeding (including: red blood, clots, spotting, or pink/brown mucous)    Negative: Vaginal bleeding  (Exception: brief spotting after intercourse or pelvic exam, or slight pinkish or brownish mucous discharge)    Negative: Leakage of fluid from vagina    Negative: [1] Baby moving less today (e.g., kick count < 5 in 1 hour or < 10 in 2 hours) AND [2] pregnant 23 or more weeks    Negative: No movement of baby for 8 hours    Negative: Severe headache or headache that won't go away    Negative:  New blurred vision or vision changes    Negative: Fever > 100.4 F (38.0 C)    Negative: Increased pressure in pelvic area    Negative: [1] Lower back discomfort AND [2] not relieved by rest    Negative: Has a cerclage (i.e., a procedure where the obstetrician stitches shut the cervix)    Negative: Pinkish or brownish mucous discharge  (Exception: brief spotting after intercourse or pelvic exam)    Negative: Patient sounds very sick or weak to the triager    Protocols used: PREGNANCY - LABOR - [id number], PREGNANCY - ABDOMINAL PAIN GREATER THAN 20 WEEKS EGA-A-AH

## 2019-10-01 ENCOUNTER — NURSE TRIAGE (OUTPATIENT)
Dept: NURSING | Facility: CLINIC | Age: 29
End: 2019-10-01

## 2019-10-01 NOTE — TELEPHONE ENCOUNTER
"Mechelle is calling about episodes of her uterus tightening and her abdomen becoming hard. She refers to these as [place] contractions.    She had episodes yesterday evening. She spoke to Dr Steele last night. Her contractions improved and she did not go in.    Today, starting at 5:30 pm, she is experiencing similar contractions but she reports them as not as intense as last night.    The contractions are not painful.  She reports that the baby continues to be active. She performed Kick Count and noted 9 movements within 15 minutes.    Per protocol, Home Care advised.  Care Advice reviewed.    Rachel Mcclendon RN  [facility]        Additional Information    Negative: Passed out (i.e., lost consciousness, collapsed and was not responding)    Negative: Shock suspected (e.g., cold/pale/clammy skin, too weak to stand, low BP, rapid pulse)    Negative: Difficult to awaken or acting confused (e.g., disoriented, slurred speech)    Negative: [1] SEVERE abdominal pain (e.g., excruciating) AND [2] constant AND [3] present > 1 hour    Negative: Severe bleeding (e.g., continuous red blood from vagina, or large blood clots)    Negative: Umbilical cord hanging out of the vagina (shiny, white, curled appearance, \"like telephone cord\")    Negative: Uncontrollable urge to push (i.e., feels like baby is coming out now)    Negative: Can see baby    Negative: Sounds like a life-threatening emergency to the triager    Negative: MODERATE-SEVERE abdominal pain    Negative: Contractions < 10 minutes apart for 1 hour (i.e., 6 or more contractions an hour)    Negative: [1] Contractions > 10 minutes apart AND [2] persist > 24 hours AND [3] no improvement using CARE ADVICE    Negative: [1] Contractions AND [2] any vaginal bleeding (including: red blood, clots, spotting, or pink/brown mucous)    Negative: Vaginal bleeding  (Exception: brief spotting after intercourse or pelvic exam, or slight pinkish or brownish mucous " discharge)    Negative: Leakage of fluid from vagina    Negative: [1] Baby moving less today (e.g., kick count < 5 in 1 hour or < 10 in 2 hours) AND [2] pregnant 23 or more weeks    Negative: No movement of baby for 8 hours    Negative: Severe headache or headache that won't go away    Negative: New blurred vision or vision changes    Negative: Fever > 100.4 F (38.0 C)    Negative: Increased pressure in pelvic area    Negative: [1] Lower back discomfort AND [2] not relieved by rest    Negative: Has a cerclage (i.e., a procedure where the obstetrician stitches shut the cervix)    Negative: Pinkish or brownish mucous discharge  (Exception: brief spotting after intercourse or pelvic exam)    Negative: Patient sounds very sick or weak to the triager    Negative: Baby has dropped    Negative: Increase in vaginal discharge    Negative: Diarrhea    Negative: Prior history of  labor    [1] Mild contractions AND [2] > 10 minutes apart    Protocols used: PREGNANCY - LABOR - [id number]

## 2019-10-04 ENCOUNTER — PRENATAL OFFICE VISIT (OUTPATIENT)
Dept: OBGYN | Facility: CLINIC | Age: 29
End: 2019-10-04
Payer: COMMERCIAL

## 2019-10-04 VITALS
DIASTOLIC BLOOD PRESSURE: 72 MMHG | SYSTOLIC BLOOD PRESSURE: 123 MMHG | WEIGHT: 181 LBS | BODY MASS INDEX: 27.93 KG/M2 | HEART RATE: 69 BPM

## 2019-10-04 DIAGNOSIS — R10.2 SUPRAPUBIC PAIN: Primary | ICD-10-CM

## 2019-10-04 LAB
ALBUMIN UR-MCNC: NEGATIVE MG/DL
APPEARANCE UR: CLEAR
BILIRUB UR QL STRIP: NEGATIVE
COLOR UR AUTO: YELLOW
GLUCOSE UR STRIP-MCNC: NEGATIVE MG/DL
HGB UR QL STRIP: NEGATIVE
KETONES UR STRIP-MCNC: NEGATIVE MG/DL
LEUKOCYTE ESTERASE UR QL STRIP: NEGATIVE
NITRATE UR QL: NEGATIVE
PH UR STRIP: 7 PH (ref 5–7)
SOURCE: NORMAL
SP GR UR STRIP: 1.01 (ref 1–1.03)
UROBILINOGEN UR STRIP-ACNC: 0.2 EU/DL (ref 0.2–1)

## 2019-10-04 PROCEDURE — 99207 ZZC PRENATAL VISIT: CPT | Performed by: OBSTETRICS & GYNECOLOGY

## 2019-10-04 PROCEDURE — 81003 URINALYSIS AUTO W/O SCOPE: CPT | Performed by: OBSTETRICS & GYNECOLOGY

## 2019-10-04 NOTE — PROGRESS NOTES
Doing ok.   Had contractions early in the week and called in a couple of time. Not painful, but a bit uncomfortable. Resolved now, just occ cramping.   Cervix high and closed. Will get UA.   Good fetal movement.   Flu shot done at work.  RTC 2 weeks.

## 2019-10-14 ENCOUNTER — TELEPHONE (OUTPATIENT)
Dept: OBGYN | Facility: CLINIC | Age: 29
End: 2019-10-14

## 2019-10-14 NOTE — TELEPHONE ENCOUNTER
Pt is concerned because her baby has hiccups.  They are lasting for a half hour.  Attempted to reassure her that this is normal and explained why it is most likely happening, also offered clinic visit for reassurance.  Pt wanted a message sent to a doctor so that she could advise.  Please advise and pt can be called back.  Virginia Sánchez RN

## 2019-10-17 ENCOUNTER — PRENATAL OFFICE VISIT (OUTPATIENT)
Dept: OBGYN | Facility: CLINIC | Age: 29
End: 2019-10-17
Payer: COMMERCIAL

## 2019-10-17 VITALS
DIASTOLIC BLOOD PRESSURE: 72 MMHG | HEART RATE: 59 BPM | WEIGHT: 186.4 LBS | TEMPERATURE: 97.9 F | BODY MASS INDEX: 28.76 KG/M2 | SYSTOLIC BLOOD PRESSURE: 111 MMHG

## 2019-10-17 DIAGNOSIS — Z34.03 ENCOUNTER FOR SUPERVISION OF NORMAL FIRST PREGNANCY IN THIRD TRIMESTER: Primary | ICD-10-CM

## 2019-10-17 PROCEDURE — 99207 ZZC PRENATAL VISIT: CPT | Performed by: OBSTETRICS & GYNECOLOGY

## 2019-10-17 NOTE — PROGRESS NOTES
32w5d  Doing well since last visit.  Continues to have some mild cramping or contractions, but nothing persistent.  No vaginal bleeding or leaking fluid.  Has noticed more discharge at the end of pregnancy.  Movement normal.  Called recently with concerns for hiccups, read online that this was a concerning sign.  Discussed that we are generally reassured by hiccups at this point, and is not a concern for fetal distress.  RTC 2w  Zuri Dumont MD

## 2019-10-23 ENCOUNTER — HOSPITAL ENCOUNTER (OUTPATIENT)
Facility: CLINIC | Age: 29
End: 2019-10-23
Admitting: OBSTETRICS & GYNECOLOGY
Payer: COMMERCIAL

## 2019-10-29 ENCOUNTER — MEDICAL CORRESPONDENCE (OUTPATIENT)
Dept: HEALTH INFORMATION MANAGEMENT | Facility: CLINIC | Age: 29
End: 2019-10-29

## 2019-11-01 ENCOUNTER — PRENATAL OFFICE VISIT (OUTPATIENT)
Dept: OBGYN | Facility: CLINIC | Age: 29
End: 2019-11-01
Payer: COMMERCIAL

## 2019-11-01 VITALS
BODY MASS INDEX: 29.94 KG/M2 | DIASTOLIC BLOOD PRESSURE: 80 MMHG | HEART RATE: 66 BPM | TEMPERATURE: 97.5 F | SYSTOLIC BLOOD PRESSURE: 135 MMHG | WEIGHT: 194 LBS

## 2019-11-01 DIAGNOSIS — Z34.03 ENCOUNTER FOR SUPERVISION OF NORMAL FIRST PREGNANCY IN THIRD TRIMESTER: Primary | ICD-10-CM

## 2019-11-01 DIAGNOSIS — R03.0 ELEVATED BLOOD PRESSURE READING WITHOUT DIAGNOSIS OF HYPERTENSION: ICD-10-CM

## 2019-11-01 PROCEDURE — 99207 ZZC PRENATAL VISIT: CPT | Performed by: OBSTETRICS & GYNECOLOGY

## 2019-11-01 NOTE — PROGRESS NOTES
34w6d  BP mildly elevated, but repeat today normal.  Asymptomatic.  Reviewed s/sx of pre-eclampsia.    Multiple discomforts in pregnancy, but all sound normal.  No contractions, vaginal bleeding or leakage of fluid.  Baby very active.  RTC 1w for BP check.  Discussed provider may or may not do GBS at that visit.  Zuri Dumont MD

## 2019-11-08 ENCOUNTER — PRENATAL OFFICE VISIT (OUTPATIENT)
Dept: OBGYN | Facility: CLINIC | Age: 29
End: 2019-11-08
Payer: COMMERCIAL

## 2019-11-08 ENCOUNTER — HOSPITAL ENCOUNTER (OUTPATIENT)
Facility: CLINIC | Age: 29
Discharge: HOME | End: 2019-11-08
Attending: OBSTETRICS & GYNECOLOGY | Admitting: OBSTETRICS & GYNECOLOGY
Payer: COMMERCIAL

## 2019-11-08 VITALS — SYSTOLIC BLOOD PRESSURE: 132 MMHG | RESPIRATION RATE: 18 BRPM | DIASTOLIC BLOOD PRESSURE: 83 MMHG

## 2019-11-08 VITALS
SYSTOLIC BLOOD PRESSURE: 140 MMHG | WEIGHT: 195 LBS | HEART RATE: 83 BPM | DIASTOLIC BLOOD PRESSURE: 90 MMHG | BODY MASS INDEX: 30.09 KG/M2 | OXYGEN SATURATION: 96 %

## 2019-11-08 DIAGNOSIS — Z34.03 ENCOUNTER FOR SUPERVISION OF NORMAL FIRST PREGNANCY IN THIRD TRIMESTER: Primary | ICD-10-CM

## 2019-11-08 PROBLEM — Z36.89 ENCOUNTER FOR TRIAGE IN PREGNANT PATIENT: Status: ACTIVE | Noted: 2019-11-08

## 2019-11-08 LAB
ALT SERPL W P-5'-P-CCNC: 29 U/L (ref 0–50)
AST SERPL W P-5'-P-CCNC: 23 U/L (ref 0–45)
CREAT SERPL-MCNC: 0.66 MG/DL (ref 0.52–1.04)
CREAT UR-MCNC: 35 MG/DL
ERYTHROCYTE [DISTWIDTH] IN BLOOD BY AUTOMATED COUNT: 12.8 % (ref 10–15)
GFR SERPL CREATININE-BSD FRML MDRD: >90 ML/MIN/{1.73_M2}
HCT VFR BLD AUTO: 41.1 % (ref 35–47)
HGB BLD-MCNC: 13.9 G/DL (ref 11.7–15.7)
MCH RBC QN AUTO: 32.8 PG (ref 26.5–33)
MCHC RBC AUTO-ENTMCNC: 33.8 G/DL (ref 31.5–36.5)
MCV RBC AUTO: 97 FL (ref 78–100)
PLATELET # BLD AUTO: 148 10E9/L (ref 150–450)
PROT UR-MCNC: <0.05 G/L
PROT/CREAT 24H UR: NORMAL G/G CR (ref 0–0.2)
RBC # BLD AUTO: 4.24 10E12/L (ref 3.8–5.2)
TSH SERPL DL<=0.005 MIU/L-ACNC: 3.1 MU/L (ref 0.4–4)
WBC # BLD AUTO: 10.2 10E9/L (ref 4–11)

## 2019-11-08 PROCEDURE — 59025 FETAL NON-STRESS TEST: CPT

## 2019-11-08 PROCEDURE — 87653 STREP B DNA AMP PROBE: CPT | Performed by: OBSTETRICS & GYNECOLOGY

## 2019-11-08 PROCEDURE — 84156 ASSAY OF PROTEIN URINE: CPT | Performed by: OBSTETRICS & GYNECOLOGY

## 2019-11-08 PROCEDURE — 82565 ASSAY OF CREATININE: CPT | Performed by: OBSTETRICS & GYNECOLOGY

## 2019-11-08 PROCEDURE — 84450 TRANSFERASE (AST) (SGOT): CPT | Performed by: OBSTETRICS & GYNECOLOGY

## 2019-11-08 PROCEDURE — 99207 ZZC PRENATAL VISIT: CPT | Performed by: OBSTETRICS & GYNECOLOGY

## 2019-11-08 PROCEDURE — 84460 ALANINE AMINO (ALT) (SGPT): CPT | Performed by: OBSTETRICS & GYNECOLOGY

## 2019-11-08 PROCEDURE — 85027 COMPLETE CBC AUTOMATED: CPT | Performed by: OBSTETRICS & GYNECOLOGY

## 2019-11-08 PROCEDURE — 36415 COLL VENOUS BLD VENIPUNCTURE: CPT | Performed by: OBSTETRICS & GYNECOLOGY

## 2019-11-08 PROCEDURE — G0463 HOSPITAL OUTPT CLINIC VISIT: HCPCS | Mod: 25

## 2019-11-08 PROCEDURE — 84443 ASSAY THYROID STIM HORMONE: CPT | Performed by: OBSTETRICS & GYNECOLOGY

## 2019-11-08 NOTE — PROGRESS NOTES
GBS today.  Fetal movements have changed in the past 1-2 weeks, still frequent but not a strong, more like pushes.  Initial blood pressure 140/90, repeat 142/90.  Has had a mild headache off and on, not unusual for her.  Some swelling.  Her mother had hypertension at term.  Discussed, she will go to L&D for further evaluation.  Dr. Baker on call, aware.  AM

## 2019-11-08 NOTE — PLAN OF CARE
Patient sent to [facility] from clinic for blood pressure evaluation and labs.  Patient to triage 1.  External monitors applied with verbal consent.

## 2019-11-09 LAB
GP B STREP DNA SPEC QL NAA+PROBE: POSITIVE
SPECIMEN SOURCE: ABNORMAL

## 2019-11-09 PROCEDURE — 87186 SC STD MICRODIL/AGAR DIL: CPT | Performed by: OBSTETRICS & GYNECOLOGY

## 2019-11-09 NOTE — PLAN OF CARE
Discharge instructions reviewed and sent home with patient.  Patient verbalized understanding.  Discharged to home accompanied by spouse.

## 2019-11-09 NOTE — DISCHARGE INSTRUCTIONS
Discharge Instruction for Undelivered Patients      You were seen for: Fetal Assessment, blood pressure and labs  We Consulted: Dr KELLY Baker  You had (Test or Medicine):fetal and uterine assessment, and labs     Diet:   Drink 8 to 12 glasses of liquids (milk, juice, water) every day.  You may eat meals and snacks.     Activity:  Call your doctor or nurse midwife if your baby is moving less than usual.     Call your provider if you notice:  Swelling in your face or increased swelling in your hands or legs.  Headaches that are not relieved by Tylenol (acetaminophen).  Changes in your vision (blurring: seeing spots or stars.)  Nausea (sick to your stomach) and vomiting (throwing up).   Weight gain of 5 pounds or more per week.  Heartburn that doesn't go away.  Signs of bladder infection: pain when you urinate (use the toilet), need to go more often and more urgently.  The bag of salazar (rupture of membranes) breaks, or you notice leaking in your underwear.  Bright red blood in your underwear.  Abdominal (lower belly) or stomach pain.  For first baby: Contractions (tightening) less than 5 minutes apart for one hour or more.  Second (plus) baby: Contractions (tightening) less than 10 minutes apart and getting stronger.  *If less than 34 weeks: Contractions (tightenings) more than 6 times in one hour.  Increase or change in vaginal discharge (note the color and amount)  Other: Dr Baker reviewed delivery recommendations.    Follow-up:  As scheduled in the clinic

## 2019-11-09 NOTE — PROGRESS NOTES
Obstetrics Triage Note    HPI:  Mechelle Mata is a 29 year old  at 35w6d by 9w4d US, here as recommended by clinic for r/o PreEclampsia and concern for decreased fetal movement. Bp in clinic 140/90 and 142/90    Today she notes that she's feeling ok. She occasionally has headaches, but she notes that she gets headaches at bedtime. Tylenol doesn't help ever with her headaches. Unsure whether headaches have changes in last few weeks, denies headache currently. No vision changes, RUQ pain, chest pain, trouble breathing. She other wise feels well.    She denies vaginal bleeding, vaginal discharge, dysuria, constipation. Thinks that over the past 2-3 weeks she has started having more cramping/contractions and they are a bit more painful on occasion but they are never regular and always go away on their own    Pregnancy is notable for:  - rubella nonimmune  - hypothyroid    ROS:  Negative except as mentioned in HPI.    PMH:  Past Medical History:   Diagnosis Date     Asthma     exercise induced     Hypothyroidism        PSHx:  Past Surgical History:   Procedure Laterality Date     C EXCISION OF GANGLION CYST FOREARM Left 2006    wrist     wisdom teeth         Medications:  No current facility-administered medications for this encounter.      Current Outpatient Medications   Medication     buPROPion (WELLBUTRIN XL) 150 MG 24 hr tablet     clindamycin (CLINDAMAX) 1 % external gel     lamoTRIgine (LAMICTAL) 150 MG tablet     levothyroxine (SYNTHROID/LEVOTHROID) 50 MCG tablet     Prenatal Vit-Fe Fumarate-FA (PRENATAL PO)     Vitamin D, Cholecalciferol, 1000 units CAPS     albuterol (PROVENTIL HFA) 108 (90 Base) MCG/ACT inhaler     lamoTRIgine (LAMICTAL) 100 MG tablet     Omega-3 Fatty Acids (FISH OIL PO)       Allergies:   No Known Allergies    Physical Exam:   Patient Vitals for the past 24 hrs:   BP Resp   19 1800 132/83 --   19 1739 139/87 18   19 1718 (!) 140/86 18   ]   BP on review of FHT  "monitor  1820 141/79     Gen: resting comfortably, in NAD  CV: Regular rate and rhythm  Pulm: CTAB, no increased work of breathing  Abd: soft, gravid, non-tender, non-distended  LE: trace LE edema bilaterally, no clonus, 3+ reflexes patellar and brachioradialis reflexes    SVE: deferred    NST:  FHT: BL 135bpm, moderate variability, positive accelerations, no decelerations  [place]: 0-1 contractions/ 10 minutes    Labs:  CBC - Hgb 13.9, Plt 148  ALT - 29  AST - 23  Cr - 0.66  UPC - <0.05  TSH 3.10    Assessment/Plan: Mechelle Mata is a 29 year old  at 35w6d by 9w4d US, here for rule out preeclampsia.    Gestational hypertension  - patient now meets criteria for gestational hypertension, work up for pre-eclampsia is negative.  - HELLP labs wnl, UPC <0.05  - Plan for follow up early next week for clinic visit for NST and further induction discussion and scheduling (message sent to clinic RN's).  Reviewed with the patient at length the indications for induction as well as the risks of gestational hypertension progressing to pre-eclampsia without severe features or with severe features. The patient requested more information given the recommendation for induction at 37 weeks.  Reviewed with the patient the FAQs ACOG of hypertension in pregnancy and reviewed specific sections of practice bulletin 202, \"Gestational Hypertension and Preeclampsia\". Discussed recommendation for induction of labor at 37 weeks with Dr Baker.  We also reviewed methods of induction including misoprostol, amezcua balloon, and pitocin.  We reviewed pain control in labor including fentanyl and epidural.    - Discussed preeclampsia warning signs including headache, vision changes, chest pain, shortness of breath, nausea/vomiting, abdominal pain, or a rapid increase in swelling.      Hypothyroidism:  Patient requests that she has her thyroid panel done since she is having blood drawn today and is due for the check.    TSH added on to labs    Patient " discussed with Dr Baker.     Cindy Florentino MD  Obstetrics and Gyncology, PGY-3  November 8, 2019 , 8:38 PM      Physician Attestation   I, Yesika Baker MD, personally examined and evaluated this patient.  I discussed the patient with the resident/fellow and care team, and agree with the assessment and plan of care as documented in the note of 11/08/19.      I personally reviewed vital signs, medications and labs.  Yesika Baker MD  Date of Service (when I saw the patient): 11/08/19

## 2019-11-11 ENCOUNTER — TELEPHONE (OUTPATIENT)
Dept: OBGYN | Facility: CLINIC | Age: 29
End: 2019-11-11

## 2019-11-11 NOTE — TELEPHONE ENCOUNTER
----- Message from Yesika Baker MD sent at 11/8/2019  7:36 PM CST -----  Regarding: Pt needs provider appointment and NST on Monday, Tues, or Wed am (Nov. 11, 12, 13)  Hello,    This patient has a new diagnosis of gestational hypertension.   She needs to be seen by a provider with an NST early next week.  Please fit her in sometime Monday, Tuesday, or Wed am for this.  She will also get a cervical exam and schedule IOL at this visit for 37w0d.  Thanks,  Yesika

## 2019-11-12 ENCOUNTER — APPOINTMENT (OUTPATIENT)
Dept: OBGYN | Facility: CLINIC | Age: 29
End: 2019-11-12
Payer: COMMERCIAL

## 2019-11-12 ENCOUNTER — PRENATAL OFFICE VISIT (OUTPATIENT)
Dept: OBGYN | Facility: CLINIC | Age: 29
End: 2019-11-12
Payer: COMMERCIAL

## 2019-11-12 VITALS
SYSTOLIC BLOOD PRESSURE: 144 MMHG | BODY MASS INDEX: 30.24 KG/M2 | HEART RATE: 71 BPM | WEIGHT: 196 LBS | DIASTOLIC BLOOD PRESSURE: 84 MMHG

## 2019-11-12 DIAGNOSIS — E03.9 HYPOTHYROIDISM, UNSPECIFIED TYPE: ICD-10-CM

## 2019-11-12 DIAGNOSIS — Z28.39 RUBELLA NON-IMMUNE STATUS, ANTEPARTUM: ICD-10-CM

## 2019-11-12 DIAGNOSIS — O09.899 RUBELLA NON-IMMUNE STATUS, ANTEPARTUM: ICD-10-CM

## 2019-11-12 DIAGNOSIS — O13.3 GESTATIONAL HYPERTENSION, THIRD TRIMESTER: Primary | ICD-10-CM

## 2019-11-12 DIAGNOSIS — O99.820 GBS (GROUP B STREPTOCOCCUS CARRIER), +RV CULTURE, CURRENTLY PREGNANT: ICD-10-CM

## 2019-11-12 LAB
BACTERIA SPEC CULT: ABNORMAL
SPECIMEN SOURCE: ABNORMAL

## 2019-11-12 PROCEDURE — 59025 FETAL NON-STRESS TEST: CPT | Performed by: OBSTETRICS & GYNECOLOGY

## 2019-11-12 PROCEDURE — 99207 ZZC PRENATAL VISIT: CPT | Performed by: OBSTETRICS & GYNECOLOGY

## 2019-11-12 RX ORDER — SODIUM CHLORIDE, SODIUM LACTATE, POTASSIUM CHLORIDE, CALCIUM CHLORIDE 600; 310; 30; 20 MG/100ML; MG/100ML; MG/100ML; MG/100ML
INJECTION, SOLUTION INTRAVENOUS CONTINUOUS
Status: CANCELLED | OUTPATIENT
Start: 2019-11-12

## 2019-11-12 RX ORDER — OXYCODONE AND ACETAMINOPHEN 5; 325 MG/1; MG/1
1 TABLET ORAL
Status: CANCELLED | OUTPATIENT
Start: 2019-11-12

## 2019-11-12 RX ORDER — OXYTOCIN/0.9 % SODIUM CHLORIDE 30/500 ML
100-340 PLASTIC BAG, INJECTION (ML) INTRAVENOUS CONTINUOUS PRN
Status: CANCELLED | OUTPATIENT
Start: 2019-11-12

## 2019-11-12 RX ORDER — NALOXONE HYDROCHLORIDE 0.4 MG/ML
.1-.4 INJECTION, SOLUTION INTRAMUSCULAR; INTRAVENOUS; SUBCUTANEOUS
Status: CANCELLED | OUTPATIENT
Start: 2019-11-12

## 2019-11-12 RX ORDER — ONDANSETRON 2 MG/ML
4 INJECTION INTRAMUSCULAR; INTRAVENOUS EVERY 6 HOURS PRN
Status: CANCELLED | OUTPATIENT
Start: 2019-11-12

## 2019-11-12 RX ORDER — CARBOPROST TROMETHAMINE 250 UG/ML
250 INJECTION, SOLUTION INTRAMUSCULAR
Status: CANCELLED | OUTPATIENT
Start: 2019-11-12

## 2019-11-12 RX ORDER — OXYTOCIN 10 [USP'U]/ML
10 INJECTION, SOLUTION INTRAMUSCULAR; INTRAVENOUS
Status: CANCELLED | OUTPATIENT
Start: 2019-11-12

## 2019-11-12 RX ORDER — CITRIC ACID/SODIUM CITRATE 334-500MG
30 SOLUTION, ORAL ORAL ONCE
Status: CANCELLED | OUTPATIENT
Start: 2019-11-12 | End: 2019-11-12

## 2019-11-12 RX ORDER — ACETAMINOPHEN 325 MG/1
650 TABLET ORAL EVERY 4 HOURS PRN
Status: CANCELLED | OUTPATIENT
Start: 2019-11-12

## 2019-11-12 RX ORDER — LIDOCAINE 40 MG/G
CREAM TOPICAL
Status: CANCELLED | OUTPATIENT
Start: 2019-11-12

## 2019-11-12 RX ORDER — METHYLERGONOVINE MALEATE 0.2 MG/ML
200 INJECTION INTRAVENOUS
Status: CANCELLED | OUTPATIENT
Start: 2019-11-12

## 2019-11-12 RX ORDER — IBUPROFEN 200 MG
800 TABLET ORAL
Status: CANCELLED | OUTPATIENT
Start: 2019-11-12

## 2019-11-12 NOTE — PATIENT INSTRUCTIONS
Please make an appointment for an ultrasound to see how baby is doing and measure the amniotic fluid on Friday 11/15/19.      Patient Education   Labor Induction  What You Need to Know  What is labor induction?  In most cases, it is best to go into labor naturally. When labor does not start on its own, we may use medicine or other methods to start (induce) labor.   This is called an induction of labor. It helps the uterus to contract. It also helps to thin, soften and open the cervix. (The cervix is the opening of the uterus.)  When is induction used?  There are two types of induction:    Medical induction is done to protect the health of the mom or baby. We may start labor if:  ? There are medical concerns for you or your baby.  ? You haven't had your baby by 41 weeks.    Elective induction is done for non-medical reasons. This may be done if:  ? You live far from the hospital.  ? You have been having contractions for many days.  ? You have given birth quickly in the past.  We will not perform an elective induction before 39 weeks. Studies show that babies born before 39 weeks may struggle with breathing, feeding, sleeping and staying warm. They are more likely to have health problems. They may need to stay in the hospital longer.  If we start your labor for medical reasons, the benefits will outweigh these risks.   We will talk to you about your risks, benefits and alternatives (other options) before we start your labor.  What might happen if my labor is induced?  Some of this depends on how your labor is started and how your body responds. Your labor may be more complicated. You and your baby may need more medical treatments. In general:    You may not go into labor right away.    You may not be able to move around during labor. You will have two belts with monitors attached to your belly. These allow the nurse to watch your contractions and your baby's heart rate.    Your labor may be longer and more painful. It  might take more than one day.    A long labor may increase the risk of infection in mother and baby.    Your labor may not progress as planned. This could lead to a  birth.  How is induction done?  We may start your labor by doing one or more of the following:    We may place medicine in your vagina, near your cervix. This can help to open and prepare the cervix for labor. It is only used when there are medical reasons to start labor.    After your cervix is ready:  ? We may give you medicine through an IV (a small tube placed in your vein). This medicine is called pitocin. It helps the muscles of your uterus to contract.  ? We may make a small opening in your bag of water (the sac around your baby). This is called an amniotomy. It may help your uterus contract and your cervix open.  Can I plan the date of my delivery?  After 39 weeks, you may ask about planning the date of your delivery. This is only an option if your body--and your baby--are ready.   We will check your cervix and your baby's heart rate.     If you are ready to be induced, we will give you a date and time to come to the hospital. If many mothers are in labor that day, we may need to start your labor at another time.    If you are not ready, we will not start your labor. It will be safer for your baby to come on its own.  What else do I need to know?  Before you have an induction, make sure you understand the reasons, risks and benefits. Ask your doctor or nurse-midwife:  Why do I need to be induced?  What are the risks to my baby?  How will you start my labor?  How will you know if my baby is ready to be born?  How will you know if my body is ready to give birth?  Where can I get more information?  You will learn more about induction if you go to [facility] Birth and Parenting class. You may also visit these websites:  The American College of Nurse-Midwives: www.mymidwife.org  The American College of Obstetricians and Gynecologists:  www.acog.org  Childbirth Connection:   www.childbirthconnection.org  March of Dimes: www.marchClaraStreamdimes.com  For informational purposes only. Not to replace the advice of your health care provider.   Copyright   2008 [facility]. All rights reserved. ZarthCode 574855 - REV 12/15.

## 2019-11-12 NOTE — PROGRESS NOTES
[facility]- OBGYN    S: Mechelle Mata is a 29 year old  at 36w3d by 9w4d ultrasound who presents today for routine prenatal visit.  Patient states that she is doing well today.  She states that she had a mild headache last night that spontaneously resolved.  She denies any regular contractions, vaginal bleeding, leakage of fluid, changes in vision, chest pain, chest pressure, shortness of breath, or increased lower extremity edema.    Pregnancy notable for:  - rubella non-immune  -hypothyroidism  -gestational hypertension  -GBS positive     O:   Patient Vitals for the past 24 hrs:   BP Pulse Weight   19 0811 (!) 144/84 71 88.9 kg (196 lb)   ]  General- awake, alert, answering questions appropriately, appears comfortable  CV- regular rate  Lung- breathing comfortably on room air  Abd- soft, non-tender, gravid  Ext- bilateral lower extremities with trace edema     Cervix- closed/ long/ high/ medium/ posterior    NST: 135bpm/ mod variability/ + accelerations/ no decelerations  [place]: rare contraction      A&P: Mechelle Mata is a 29 year old  at 36w3d by 9w4d ultrasound who presents today for routine prenatal visit.     (O13.3) Gestational hypertension, third trimester  (primary encounter diagnosis)  Comment: Mild range BP today, stable.  Patient with category 1, reactive NST  Plan: US Fetal Biophys Prof w/o Non Stress Test Friday for twice weekly testing             Again reviewed that the ACOG recommendation for gestational hypertension is induction of labor at 37w0d.  The patient has plans this weekend for a stay in [place] with her spouse and would strongly prefer to defer induction of labor until Monday at which time she will be 37w2d.  Patient scheduled for IOL on 19 at 0800.  Patient instructed to call one hour ahead of time.  We reviewed cervical ripening and pitocin.  We discussed that when being given medications for induction of labor she would be on continuous monitoring.   She requests a large room if possible.    Labor orders placed.   Goals in labor include:  -avoiding medications for pain control, but may consider an epidural  -remaining mobile    Patient has MD appointment scheduled for Friday.  She is considering keeping it in case she comes up with more questions about induction.  Plan to verify OTC post-partum meds at next visit    Patient given severe range BP precautions and instructed to call and come in with severe range BPs (systolic >=160 and/or diastolic >= 110.  Patient will check BP daily or with symptoms.      (O99.820) GBS (group B Streptococcus carrier), +RV culture, currently pregnant  Comment: Reviewed GBS positive status with patient.    Plan: Plan for penicillin IV while in labor    (O99.89,  Z28.3) Rubella non-immune status, antepartum  Comment: patient rubella non-immune  Plan: MMR vaccine postpartum     (E03.9) Hypothyroidism, unspecified type  Comment: Patient with hypothyroidism on levothyroxine.  Stable.  Last TSH on 11/8/19 was WNL.  Plan: Continue levothyroxine.     Yesika Baker

## 2019-11-13 NOTE — TELEPHONE ENCOUNTER
Paperwork left at [facility] after patient's appointment on 11/12. Spoke with patient- she needs it updated to reflect her induction date of 11/18. Will update and leave at [facility] for her to  at next visit.  Jennifer Redman,

## 2019-11-15 ENCOUNTER — ANCILLARY PROCEDURE (OUTPATIENT)
Dept: ULTRASOUND IMAGING | Facility: CLINIC | Age: 29
End: 2019-11-15
Attending: OBSTETRICS & GYNECOLOGY
Payer: COMMERCIAL

## 2019-11-15 DIAGNOSIS — O13.3 GESTATIONAL HYPERTENSION, THIRD TRIMESTER: ICD-10-CM

## 2019-11-15 PROCEDURE — 76819 FETAL BIOPHYS PROFIL W/O NST: CPT | Performed by: OBSTETRICS & GYNECOLOGY

## 2019-11-16 ENCOUNTER — NURSE TRIAGE (OUTPATIENT)
Dept: NURSING | Facility: CLINIC | Age: 29
End: 2019-11-16

## 2019-11-17 ENCOUNTER — NURSE TRIAGE (OUTPATIENT)
Dept: NURSING | Facility: CLINIC | Age: 29
End: 2019-11-17

## 2019-11-17 NOTE — TELEPHONE ENCOUNTER
"\"I had called there([facility]) last night and spoke with a mid wife about some back pain I was having. I didn't need to go in. Today I'm still having it, but not as bad. I also am really nauseated. I am drinking fluids, eating cracker and had breakfast. I also have nasal congestion. I am being induced in the morning 11/18 due to gestational hypertension. Just wondering if I need to lalito in sooner\" \" denies bleeding, leakage of fluids or contractions at this time. Baby is active. Triaged and gave home care advice,  Call back if needed. Caller agrees with plan.  Aileen Mcgraw RN [facility]      Additional Information    Back pain    Protocols used: PREGNANCY - BACK PAIN-A-AH      "

## 2019-11-17 NOTE — TELEPHONE ENCOUNTER
"Paged via smart web at 11:08 pm Dr. Dumont to call pt     Please call [phone] Mechelle Mata due 12/7/19 will be induced 11/11/18/19 having constant back pain > 4 hrs [phone]. [phone] [birth year]  Pt instructed to call back in 20 minutes if she does not hear from on call.  Additional Information    Negative: Passed out (i.e., lost consciousness, collapsed and was not responding)    Negative: Shock suspected (e.g., cold/pale/clammy skin, too weak to stand, low BP, rapid pulse)    Negative: Difficult to awaken or acting confused (e.g., disoriented, slurred speech)    Negative: [1] SEVERE abdominal pain (e.g., excruciating) AND [2] constant AND [3] present > 1 hour    Negative: Severe bleeding (e.g., continuous red blood from vagina, or large blood clots)    Negative: Umbilical cord hanging out of the vagina (shiny, white, curled appearance, \"like telephone cord\")    Negative: Uncontrollable urge to push (i.e., feels like baby is coming out now)    Negative: Can see baby    Negative: Sounds like a life-threatening emergency to the triager    Negative: MODERATE-SEVERE abdominal pain    Negative: Contractions < 10 minutes apart for 1 hour (i.e., 6 or more contractions an hour)    Negative: [1] Contractions > 10 minutes apart AND [2] persist > 24 hours AND [3] no improvement using CARE ADVICE    Negative: [1] Contractions AND [2] any vaginal bleeding (including: red blood, clots, spotting, or pink/brown mucous)    Negative: Vaginal bleeding  (Exception: brief spotting after intercourse or pelvic exam, or slight pinkish or brownish mucous discharge)    Negative: Leakage of fluid from vagina    Negative: [1] Baby moving less today (e.g., kick count < 5 in 1 hour or < 10 in 2 hours) AND [2] pregnant 23 or more weeks    Negative: No movement of baby for 8 hours    Negative: Severe headache or headache that won't go away    Negative: New blurred vision or vision changes    Negative: Fever > 100.4 F (38.0 C)    Negative: " "Increased pressure in pelvic area    Negative: [1] Lower back discomfort AND [2] not relieved by rest    Negative: Has a cerclage (i.e., a procedure where the obstetrician stitches shut the cervix)    Negative: Pinkish or brownish mucous discharge  (Exception: brief spotting after intercourse or pelvic exam)    Negative: Patient sounds very sick or weak to the triager    Negative: Baby has dropped    Negative: Increase in vaginal discharge    Negative: Diarrhea    Negative: Prior history of  labor    Negative: [1] Mild contractions AND [2] > 10 minutes apart    Negative: Slight spotting after sexual intercourse or pelvic exam    Commented on: All Negative - Home Care     Nursing judgement is to call on call provider to speak with patient.  The patient agreed with this plan.    Answer Assessment - Initial Assessment Questions  1. ONSET: \"When did the symptoms begin?\"         4 hours  2. CONTRACTIONS: \"Describe the contractions that you are having.\" (e.g., duration, frequency, regularity, severity)      Constant pain in middle of upper back  3. KADEN: \"What date are you expecting to deliver?\"      2019  4. PARITY: \"Have you had a baby before?\" If yes, \"How long did the labor last?\"        5. FETAL MOVEMENT: \"Has the baby's movement decreased or changed significantly from normal?\"      Positive  6. OTHER SYMPTOMS: \"Do you have any other symptoms?\" (e.g., leaking fluid from vagina, fever, hand/facial swelling)        The patient was just diagnosed with pregnancy induced hypertension and her B/P this evening is 149/89.  She is scheduled to be induced on 19.    Protocols used: PREGNANCY - LABOR - [id number]      "

## 2019-11-18 ENCOUNTER — HOSPITAL ENCOUNTER (INPATIENT)
Facility: CLINIC | Age: 29
LOS: 4 days | Discharge: HOME | End: 2019-11-22
Attending: OBSTETRICS & GYNECOLOGY | Admitting: OBSTETRICS & GYNECOLOGY
Payer: COMMERCIAL

## 2019-11-18 DIAGNOSIS — Z98.891 S/P CESAREAN SECTION: Primary | ICD-10-CM

## 2019-11-18 PROBLEM — O13.9 GESTATIONAL HYPERTENSION: Status: ACTIVE | Noted: 2019-11-18

## 2019-11-18 LAB
ABO + RH BLD: NORMAL
ABO + RH BLD: NORMAL
ALT SERPL W P-5'-P-CCNC: 20 U/L (ref 0–50)
AST SERPL W P-5'-P-CCNC: 24 U/L (ref 0–45)
BASOPHILS # BLD AUTO: 0 10E9/L (ref 0–0.2)
BASOPHILS NFR BLD AUTO: 0.2 %
BLD GP AB SCN SERPL QL: NORMAL
BLOOD BANK CMNT PATIENT-IMP: NORMAL
CREAT SERPL-MCNC: 0.81 MG/DL (ref 0.52–1.04)
CREAT UR-MCNC: 36 MG/DL
DIFFERENTIAL METHOD BLD: ABNORMAL
EOSINOPHIL # BLD AUTO: 0.1 10E9/L (ref 0–0.7)
EOSINOPHIL NFR BLD AUTO: 0.5 %
ERYTHROCYTE [DISTWIDTH] IN BLOOD BY AUTOMATED COUNT: 12.6 % (ref 10–15)
GFR SERPL CREATININE-BSD FRML MDRD: >90 ML/MIN/{1.73_M2}
HCT VFR BLD AUTO: 40.2 % (ref 35–47)
HGB BLD-MCNC: 13.7 G/DL (ref 11.7–15.7)
IMM GRANULOCYTES # BLD: 0.1 10E9/L (ref 0–0.4)
IMM GRANULOCYTES NFR BLD: 0.4 %
LYMPHOCYTES # BLD AUTO: 1.4 10E9/L (ref 0.8–5.3)
LYMPHOCYTES NFR BLD AUTO: 11.1 %
MCH RBC QN AUTO: 32.5 PG (ref 26.5–33)
MCHC RBC AUTO-ENTMCNC: 34.1 G/DL (ref 31.5–36.5)
MCV RBC AUTO: 96 FL (ref 78–100)
MONOCYTES # BLD AUTO: 1.5 10E9/L (ref 0–1.3)
MONOCYTES NFR BLD AUTO: 11.7 %
NEUTROPHILS # BLD AUTO: 9.9 10E9/L (ref 1.6–8.3)
NEUTROPHILS NFR BLD AUTO: 76.1 %
NRBC # BLD AUTO: 0 10*3/UL
NRBC BLD AUTO-RTO: 0 /100
PLATELET # BLD AUTO: 148 10E9/L (ref 150–450)
PROT UR-MCNC: <0.05 G/L
PROT/CREAT 24H UR: NORMAL G/G CR (ref 0–0.2)
RBC # BLD AUTO: 4.21 10E12/L (ref 3.8–5.2)
SPECIMEN EXP DATE BLD: NORMAL
WBC # BLD AUTO: 12.9 10E9/L (ref 4–11)

## 2019-11-18 PROCEDURE — 86850 RBC ANTIBODY SCREEN: CPT | Performed by: OBSTETRICS & GYNECOLOGY

## 2019-11-18 PROCEDURE — 86901 BLOOD TYPING SEROLOGIC RH(D): CPT | Performed by: OBSTETRICS & GYNECOLOGY

## 2019-11-18 PROCEDURE — 84156 ASSAY OF PROTEIN URINE: CPT | Performed by: OBSTETRICS & GYNECOLOGY

## 2019-11-18 PROCEDURE — 25000128 H RX IP 250 OP 636

## 2019-11-18 PROCEDURE — 3E0P7VZ INTRODUCTION OF HORMONE INTO FEMALE REPRODUCTIVE, VIA NATURAL OR ARTIFICIAL OPENING: ICD-10-PCS | Performed by: OBSTETRICS & GYNECOLOGY

## 2019-11-18 PROCEDURE — 86780 TREPONEMA PALLIDUM: CPT | Performed by: OBSTETRICS & GYNECOLOGY

## 2019-11-18 PROCEDURE — 12000001 ZZH R&B MED SURG/OB UMMC

## 2019-11-18 PROCEDURE — 25000132 ZZH RX MED GY IP 250 OP 250 PS 637: Performed by: OBSTETRICS & GYNECOLOGY

## 2019-11-18 PROCEDURE — 85025 COMPLETE CBC W/AUTO DIFF WBC: CPT | Performed by: OBSTETRICS & GYNECOLOGY

## 2019-11-18 PROCEDURE — 84450 TRANSFERASE (AST) (SGOT): CPT | Performed by: OBSTETRICS & GYNECOLOGY

## 2019-11-18 PROCEDURE — 59025 FETAL NON-STRESS TEST: CPT | Mod: 26 | Performed by: OBSTETRICS & GYNECOLOGY

## 2019-11-18 PROCEDURE — 25800030 ZZH RX IP 258 OP 636: Performed by: OBSTETRICS & GYNECOLOGY

## 2019-11-18 PROCEDURE — 84460 ALANINE AMINO (ALT) (SGPT): CPT | Performed by: OBSTETRICS & GYNECOLOGY

## 2019-11-18 PROCEDURE — 25000128 H RX IP 250 OP 636: Performed by: OBSTETRICS & GYNECOLOGY

## 2019-11-18 PROCEDURE — 82565 ASSAY OF CREATININE: CPT | Performed by: OBSTETRICS & GYNECOLOGY

## 2019-11-18 PROCEDURE — 59200 INSERT CERVICAL DILATOR: CPT | Performed by: OBSTETRICS & GYNECOLOGY

## 2019-11-18 PROCEDURE — 86900 BLOOD TYPING SEROLOGIC ABO: CPT | Performed by: OBSTETRICS & GYNECOLOGY

## 2019-11-18 RX ORDER — FENTANYL CITRATE 50 UG/ML
50-100 INJECTION, SOLUTION INTRAMUSCULAR; INTRAVENOUS
Status: DISCONTINUED | OUTPATIENT
Start: 2019-11-18 | End: 2019-11-19

## 2019-11-18 RX ORDER — SODIUM CHLORIDE, SODIUM LACTATE, POTASSIUM CHLORIDE, CALCIUM CHLORIDE 600; 310; 30; 20 MG/100ML; MG/100ML; MG/100ML; MG/100ML
INJECTION, SOLUTION INTRAVENOUS CONTINUOUS
Status: DISCONTINUED | OUTPATIENT
Start: 2019-11-18 | End: 2019-11-19

## 2019-11-18 RX ORDER — FENTANYL CITRATE 50 UG/ML
INJECTION, SOLUTION INTRAMUSCULAR; INTRAVENOUS
Status: COMPLETED
Start: 2019-11-18 | End: 2019-11-18

## 2019-11-18 RX ORDER — NALOXONE HYDROCHLORIDE 0.4 MG/ML
.1-.4 INJECTION, SOLUTION INTRAMUSCULAR; INTRAVENOUS; SUBCUTANEOUS
Status: DISCONTINUED | OUTPATIENT
Start: 2019-11-18 | End: 2019-11-19

## 2019-11-18 RX ORDER — TERBUTALINE SULFATE 1 MG/ML
0.25 INJECTION, SOLUTION SUBCUTANEOUS
Status: DISCONTINUED | OUTPATIENT
Start: 2019-11-18 | End: 2019-11-19

## 2019-11-18 RX ORDER — HYDROXYZINE HYDROCHLORIDE 50 MG/1
100 TABLET, FILM COATED ORAL
Status: DISCONTINUED | OUTPATIENT
Start: 2019-11-18 | End: 2019-11-22 | Stop reason: HOSPADM

## 2019-11-18 RX ORDER — FAMOTIDINE 10 MG
10 TABLET ORAL 2 TIMES DAILY PRN
Status: DISCONTINUED | OUTPATIENT
Start: 2019-11-18 | End: 2019-11-22 | Stop reason: HOSPADM

## 2019-11-18 RX ORDER — LEVOTHYROXINE SODIUM 50 UG/1
50 TABLET ORAL DAILY
Status: DISCONTINUED | OUTPATIENT
Start: 2019-11-19 | End: 2019-11-22 | Stop reason: HOSPADM

## 2019-11-18 RX ORDER — MORPHINE SULFATE 10 MG/ML
10 INJECTION, SOLUTION INTRAMUSCULAR; INTRAVENOUS
Status: DISCONTINUED | OUTPATIENT
Start: 2019-11-18 | End: 2019-11-19

## 2019-11-18 RX ORDER — LIDOCAINE 40 MG/G
CREAM TOPICAL
Status: DISCONTINUED | OUTPATIENT
Start: 2019-11-18 | End: 2019-11-19

## 2019-11-18 RX ORDER — PENICILLIN G POTASSIUM 5000000 [IU]/1
5 INJECTION, POWDER, FOR SOLUTION INTRAMUSCULAR; INTRAVENOUS ONCE
Status: DISCONTINUED | OUTPATIENT
Start: 2019-11-18 | End: 2019-11-19

## 2019-11-18 RX ORDER — MISOPROSTOL 100 UG/1
25 TABLET ORAL EVERY 4 HOURS PRN
Status: DISCONTINUED | OUTPATIENT
Start: 2019-11-18 | End: 2019-11-19

## 2019-11-18 RX ORDER — OXYCODONE AND ACETAMINOPHEN 5; 325 MG/1; MG/1
1 TABLET ORAL
Status: DISCONTINUED | OUTPATIENT
Start: 2019-11-18 | End: 2019-11-19

## 2019-11-18 RX ORDER — OXYTOCIN/0.9 % SODIUM CHLORIDE 30/500 ML
100-340 PLASTIC BAG, INJECTION (ML) INTRAVENOUS CONTINUOUS PRN
Status: COMPLETED | OUTPATIENT
Start: 2019-11-18 | End: 2019-11-19

## 2019-11-18 RX ORDER — OXYTOCIN 10 [USP'U]/ML
10 INJECTION, SOLUTION INTRAMUSCULAR; INTRAVENOUS
Status: DISCONTINUED | OUTPATIENT
Start: 2019-11-18 | End: 2019-11-19

## 2019-11-18 RX ORDER — METHYLERGONOVINE MALEATE 0.2 MG/ML
200 INJECTION INTRAVENOUS
Status: DISCONTINUED | OUTPATIENT
Start: 2019-11-18 | End: 2019-11-19

## 2019-11-18 RX ORDER — CALCIUM CARBONATE 500 MG/1
500-1000 TABLET, CHEWABLE ORAL 3 TIMES DAILY PRN
Status: DISCONTINUED | OUTPATIENT
Start: 2019-11-18 | End: 2019-11-22 | Stop reason: HOSPADM

## 2019-11-18 RX ORDER — ONDANSETRON 2 MG/ML
4 INJECTION INTRAMUSCULAR; INTRAVENOUS EVERY 6 HOURS PRN
Status: DISCONTINUED | OUTPATIENT
Start: 2019-11-18 | End: 2019-11-19

## 2019-11-18 RX ORDER — CITRIC ACID/SODIUM CITRATE 334-500MG
30 SOLUTION, ORAL ORAL ONCE
Status: COMPLETED | OUTPATIENT
Start: 2019-11-18 | End: 2019-11-19

## 2019-11-18 RX ORDER — FENTANYL CITRATE 50 UG/ML
50-100 INJECTION, SOLUTION INTRAMUSCULAR; INTRAVENOUS
Status: DISCONTINUED | OUTPATIENT
Start: 2019-11-18 | End: 2019-11-18

## 2019-11-18 RX ORDER — FENTANYL CITRATE 50 UG/ML
100 INJECTION, SOLUTION INTRAMUSCULAR; INTRAVENOUS
Status: DISCONTINUED | OUTPATIENT
Start: 2019-11-18 | End: 2019-11-18

## 2019-11-18 RX ORDER — ACETAMINOPHEN 325 MG/1
650 TABLET ORAL EVERY 4 HOURS PRN
Status: DISCONTINUED | OUTPATIENT
Start: 2019-11-18 | End: 2019-11-19

## 2019-11-18 RX ORDER — BUPROPION HYDROCHLORIDE 150 MG/1
150 TABLET ORAL DAILY
Status: DISCONTINUED | OUTPATIENT
Start: 2019-11-19 | End: 2019-11-18

## 2019-11-18 RX ORDER — BUPROPION HYDROCHLORIDE 150 MG/1
150 TABLET ORAL DAILY
Status: DISCONTINUED | OUTPATIENT
Start: 2019-11-19 | End: 2019-11-22 | Stop reason: HOSPADM

## 2019-11-18 RX ORDER — IBUPROFEN 800 MG/1
800 TABLET, FILM COATED ORAL
Status: DISCONTINUED | OUTPATIENT
Start: 2019-11-18 | End: 2019-11-19

## 2019-11-18 RX ORDER — LAMOTRIGINE 100 MG/1
100 TABLET ORAL AT BEDTIME
Status: DISCONTINUED | OUTPATIENT
Start: 2019-11-18 | End: 2019-11-22 | Stop reason: HOSPADM

## 2019-11-18 RX ORDER — ONDANSETRON 4 MG/1
4 TABLET, ORALLY DISINTEGRATING ORAL EVERY 6 HOURS PRN
Status: DISCONTINUED | OUTPATIENT
Start: 2019-11-18 | End: 2019-11-19

## 2019-11-18 RX ORDER — CARBOPROST TROMETHAMINE 250 UG/ML
250 INJECTION, SOLUTION INTRAMUSCULAR
Status: DISCONTINUED | OUTPATIENT
Start: 2019-11-18 | End: 2019-11-19

## 2019-11-18 RX ORDER — ALBUTEROL SULFATE 90 UG/1
2 AEROSOL, METERED RESPIRATORY (INHALATION)
Status: DISCONTINUED | OUTPATIENT
Start: 2019-11-18 | End: 2019-11-22 | Stop reason: HOSPADM

## 2019-11-18 RX ADMIN — ONDANSETRON 4 MG: 4 TABLET, ORALLY DISINTEGRATING ORAL at 17:04

## 2019-11-18 RX ADMIN — OMEPRAZOLE 20 MG: 20 CAPSULE, DELAYED RELEASE ORAL at 17:04

## 2019-11-18 RX ADMIN — FENTANYL CITRATE 50 MCG: 50 INJECTION, SOLUTION INTRAMUSCULAR; INTRAVENOUS at 21:06

## 2019-11-18 RX ADMIN — SODIUM CHLORIDE, POTASSIUM CHLORIDE, SODIUM LACTATE AND CALCIUM CHLORIDE 500 ML: 600; 310; 30; 20 INJECTION, SOLUTION INTRAVENOUS at 22:24

## 2019-11-18 RX ADMIN — FENTANYL CITRATE 50 MCG: 50 INJECTION INTRAMUSCULAR; INTRAVENOUS at 21:48

## 2019-11-18 RX ADMIN — FAMOTIDINE 10 MG: 10 TABLET ORAL at 10:35

## 2019-11-18 RX ADMIN — LAMOTRIGINE 100 MG: 100 TABLET ORAL at 22:54

## 2019-11-18 RX ADMIN — Medication 25 MCG: at 15:50

## 2019-11-18 RX ADMIN — Medication 25 MCG: at 11:41

## 2019-11-18 RX ADMIN — ONDANSETRON 4 MG: 4 TABLET, ORALLY DISINTEGRATING ORAL at 10:35

## 2019-11-18 RX ADMIN — FENTANYL CITRATE 50 MCG: 50 INJECTION INTRAMUSCULAR; INTRAVENOUS at 21:06

## 2019-11-18 ASSESSMENT — MIFFLIN-ST. JEOR: SCORE: 1657.55

## 2019-11-18 NOTE — H&P
[facility], [place]    History and Physical  Obstetrics and Gynecology     Date of Admission:  2019   Date of Service (when I saw the patient): 19      ASSESSMENT:   Mechelle Mata is a 29 year old  at 37w2d who presents for induction of labor for gestational hypertension.  Cervix unfavorable.   NST reactive.  Category  I  GBS positive  Rh positive    PLAN:   Admit - see IP orders  Induction of labor - cervical ripening with vaginal misoprostol  Gestational htn - will repeat UPC and HELLP labs, monitor BPs closely  GBS pos - PCN in active labor for prophylaxis  Anxiety - continue home meds lamictal and welbutrin  Hypothyroidism - continue home med levothyroxine  Rubella non-immune - plan MMR postpartum  Discussed potentially long induction process. Anticipate .    Zuri Cisneros MD      History of Present Illness  Mechelle Mata is a 29 year old  at 37w2d  Estimated Date of Delivery: Dec 7, 2019 by 9 week ultrasound not consistent with LMP but consistent with estimated date of conception.  Patient's last menstrual period was 2019 (exact date). Mechelle  [facility].  Indication gestational hypertension.    Reports some nausea and vomiting this weekend.   Denies headache, visual changes. Some epigastric pain and thoracic back pain.    Contractions: irregular   Leakage of fluid:  No  Vaginal bleeding:  No  Fetal movement:   Present    OB History    Para Term  AB Living   1 0 0 0 0 0   SAB TAB Ectopic Multiple Live Births   0 0 0 0 0      # Outcome Date GA Lbr [place]/2nd Weight Sex Delivery Anes PTL Lv   1 Current                Prenatal Course  Prenatal course was   complicated by    Patient Active Problem List    Diagnosis Date Noted     Encounter for triage in pregnant patient 2019     Priority: Medium         OB US:   11/15/19: BPP 8/8, cephalic, CHERYL 15.7cm  19: 22w4d. Spine appears normal,  growth and anatomy wnl  19: 18w3d. Anterior placenta, no previa. Normal fluid. Normal growth and anatomy, suboptimal spine views.   19: 12w6d. Normal NT, nasal bone present.      Prenatal labs:  Blood type: A pos  Rh: positive  Rubella: Not immune  Hep B sAg: non-reactive  HIV: non-reactive  RPR: negative  GBS: positive  Aneuploidy screening: FTS wnl, msAFP wnl     Ref. Range 5/3/2019 16:03 2019 16:42 2019 16:59 2019 17:40 2019 18:02   TSH Latest Ref Range: 0.40 - 4.00 mU/L 1.73 2.28 1.66  3.10     Medical History   I have reviewed this patient's medical history and updated it with pertinent information if needed.   Past Medical History:   Diagnosis Date     Asthma     exercise induced     Depressive disorder      Hypothyroidism        Past Surgical History  I have reviewed this patient's surgical history and updated it with pertinent information if needed.  Past Surgical History:   Procedure Laterality Date     C EXCISION OF GANGLION CYST FOREARM Left 2006    wrist     wisdom teeth         Prior to Admission Medications  No current facility-administered medications on file prior to encounter.   albuterol (PROVENTIL HFA) 108 (90 Base) MCG/ACT inhaler, Inhale 2 puffs into the lungs  buPROPion (WELLBUTRIN XL) 150 MG 24 hr tablet,   CHOLINE BITARTRATE PO, Take 250-500 mg by mouth daily  clindamycin (CLINDAMAX) 1 % external gel, Apply topically 2 times daily  lamoTRIgine (LAMICTAL) 100 MG tablet, Take 1 tablet (100 mg) by mouth daily  levothyroxine (SYNTHROID/LEVOTHROID) 50 MCG tablet, Take 1 tablet (50 mcg) by mouth daily  Prenatal Vit-Fe Fumarate-FA (PRENATAL PO),   Vitamin D, Cholecalciferol, 1000 units CAPS, Take 2,000 Units by mouth   [] Misc. Devices (BREAST PUMP) MISC, 1 each once for 1 dose Double electric breast pump  [] Misc. Devices (BREAST PUMP) MISC, 1 each once for 1 dose Double electric        Allergies   No Known Allergies    Social History  I have reviewed this  "patient's social history and updated it with pertinent information if needed. Mechelle Mata  reports that she has never smoked. She has never used smokeless tobacco. She reports previous alcohol use. She reports that she does not use drugs.    Family History  I have reviewed this patient's family history and updated it with pertinent information if needed.   Family History   Problem Relation Age of Onset     Mental Illness Mother      Depression Mother      Mental Illness Father      Depression Father      Heart Failure Paternal Grandfather        Immunization History  Immunization History   Administered Date(s) Administered     Influenza Vaccine, 3 YRS +, IM (QUADRIVALENT W/PRESERVATIVES) 10/02/2019     TDAP Vaccine (Adacel) 09/23/2019       Physical Exam  Vitals:    11/18/19 0830   Resp: 24   Temp: 97.9  F (36.6  C)   TempSrc: Oral   Weight: 88.9 kg (196 lb)   Height: 1.727 m (5' 8\")     Estimated body mass index is 29.8 kg/m  as calculated from the following:    Height as of this encounter: 1.727 m (5' 8\").    Weight as of this encounter: 88.9 kg (196 lb).    Fetal Heart Tones: 140 baseline, moderate variablility, + accels, no decels and Category I  TOCO:   irregular    Constitutional: healthy, alert, active and no distress   Respiratory: No increased work of breathing, good air exchange, clear to auscultation bilaterally, no crackles or wheezing  Cardiovascular: Normal apical impulse, regular rate and rhythm, normal S1 and S2, no S3 or S4, and no murmur noted  Abdomen: gravid, single vertex fetus, non-tender, EFW 7 lbs   :  External genitalia:  BUS WNL, lesions?: No  Cervix:   Membranes: intact   Dilation: closed   Effacement: 30%   Station:FLOATING   Consistency: average   Position: Posterior  Skin/Extremites: warm, well perfused, trace edema  Neurologic: Grossly intact, 3+ DTRs in bilateral lower extremities, 2 beats of clonus bilaterally    Bedside ultrasound: cephalic, OP    Labs  No results found for this " or any previous visit (from the past 24 hour(s)).

## 2019-11-18 NOTE — PROGRESS NOTES
"   [facility] PROGRESS NOTE:   2019 4:25 PM         SUBJECTIVE:   Patient complains of mild cramping.    Contractions: rare  Leakage of fluid:  No  Vaginal bleeding:  No  Pain controlled:  Yes  No headache, no visual changes, no RUQ pain         OBJECTIVE:     Vitals:    19 0830 19 0843 19 1200   BP:  137/85 119/67   Pulse:  (P) 58    Resp: 24  20   Temp: 97.9  F (36.6  C)  98.7  F (37.1  C)   TempSrc: Oral  Oral   Weight: 88.9 kg (196 lb)     Height: 1.727 m (5' 8\")           NST:  Fetal Heart Rate Tracin bpm baseline, mod variability, + accels, no decels  Category 1    Tocometer: Uterine irritability    Gen: alert, oriented, no distress  Abdomen:  Gravid, nontender  Cervix:   Dilation: fingertip   Effacement: 40%   Station:-4    Misoprostol #2 placed at 1550         LABS:     Recent Results (from the past 12 hour(s))   ALT    Collection Time: 19 11:17 AM   Result Value Ref Range    ALT 20 0 - 50 U/L   AST    Collection Time: 19 11:17 AM   Result Value Ref Range    AST 24 0 - 45 U/L   Creatinine    Collection Time: 19 11:17 AM   Result Value Ref Range    Creatinine 0.81 0.52 - 1.04 mg/dL    GFR Estimate >90 >60 mL/min/[1.73_m2]    GFR Estimate If Black >90 >60 mL/min/[1.73_m2]   ABO/Rh type and screen    Collection Time: 19 11:17 AM   Result Value Ref Range    ABO A     RH(D) Pos     Antibody Screen Neg     Test Valid Only At          [facility]    Specimen Expires 2019    CBC with platelets differential    Collection Time: 19 11:17 AM   Result Value Ref Range    WBC 12.9 (H) 4.0 - 11.0 10e9/L    RBC Count 4.21 3.8 - 5.2 10e12/L    Hemoglobin 13.7 11.7 - 15.7 g/dL    Hematocrit 40.2 35.0 - 47.0 %    MCV 96 78 - 100 fl    MCH 32.5 26.5 - 33.0 pg    MCHC 34.1 31.5 - 36.5 g/dL    RDW 12.6 10.0 - 15.0 %    Platelet Count 148 (L) 150 - 450 10e9/L    Diff Method Automated Method     % " Neutrophils 76.1 %    % Lymphocytes 11.1 %    % Monocytes 11.7 %    % Eosinophils 0.5 %    % Basophils 0.2 %    % Immature Granulocytes 0.4 %    Nucleated RBCs 0 0 /100    Absolute Neutrophil 9.9 (H) 1.6 - 8.3 10e9/L    Absolute Lymphocytes 1.4 0.8 - 5.3 10e9/L    Absolute Monocytes 1.5 (H) 0.0 - 1.3 10e9/L    Absolute Eosinophils 0.1 0.0 - 0.7 10e9/L    Absolute Basophils 0.0 0.0 - 0.2 10e9/L    Abs Immature Granulocytes 0.1 0 - 0.4 10e9/L    Absolute Nucleated RBC 0.0    Protein  random urine with Creat Ratio    Collection Time: 19 12:50 PM   Result Value Ref Range    Protein Random Urine <0.05 g/L    Protein Total Urine g/gr Creatinine Unable to calculate due to low value 0 - 0.2 g/g Cr   Creatinine urine calculation only    Collection Time: 19 12:50 PM   Result Value Ref Range    Creatinine Urine 36 mg/dL              ASSESSMENT / PLAN:   29 year old  at 37w2d admitted for IOL for gestational htn.    Labor: continue cervical ripening with misoprostol   Fetal well being: Category 1 tracing.   GBS: pos  Gest htn: Bps normal to mild range. No proteinuria  Anticipate .    Zuri Cisneros MD

## 2019-11-18 NOTE — PLAN OF CARE
Pt admitted for induction today after 3 BP with systolic 140 or greater in clinic.     Denies headache, vision changes. Reports central upper abdominal discomfort but believes it is r/t diastasis recti. Denies RUQ pain. Reflexes brisk bilat, positive clonus 2 beats. Preeclamptic labs normal.    VSS, no elevated BPs since admission. Vaginal miso placed 1140, occasional ctx. Pt is resting and up ad john.

## 2019-11-18 NOTE — TELEPHONE ENCOUNTER
"Mechelle reports that she has been having contractions with varying times - from 1 min apart up to 20 min apart.    She is scheduled for an induction tomorrow morning - 11/18    She called triage line last night due to back pain.  She called triage line this morning due to  feeling sick - she was having stomach pain and cramps.    She reports that she is now having contractions but they are not coming regularly.    She reports her normal body temperature is lower ~97.6 .  She has an elevated temp of 99.8  orally.    She reports that she is Group B Strep+    Per protocol, Home Care advised.    9:37 pm - Spoke to [facility] charge RNGisela. Notified of patient reports of normal temp, current temp and report of GBS+. Continue with Home Care advice to monitor contractions.    9:41 pm - Spoke to patient to notify of contact with L&D charge nurse and continued advice for Home Care and monitoring contractions.      Rachel Mcclendon RN  [facility]        Additional Information    Negative: Passed out (i.e., lost consciousness, collapsed and was not responding)    Negative: Shock suspected (e.g., cold/pale/clammy skin, too weak to stand, low BP, rapid pulse)    Negative: Difficult to awaken or acting confused (e.g., disoriented, slurred speech)    Negative: [1] SEVERE abdominal pain (e.g., excruciating) AND [2] constant AND [3] present > 1 hour    Negative: Severe bleeding (e.g., continuous red blood from vagina, or large blood clots)    Negative: Umbilical cord hanging out of the vagina (shiny, white, curled appearance, \"like telephone cord\")    Negative: Uncontrollable urge to push (i.e., feels like baby is coming out now)    Negative: Can see baby    Negative: Sounds like a life-threatening emergency to the triager    Negative: [1] First baby (primipara) AND [2] contractions < 6 minutes apart  AND [3] present 2 hours    Negative: [1] History of prior delivery (multipara) AND [2] contractions < 10 minutes " apart AND [3] present 1 hour    Negative: [1] History of rapid prior delivery AND [2] contractions < 10 minutes apart    Negative: [1] Leakage of fluid from vagina AND [2] green or brown in color    Negative: [1] Leakage of fluid from vagina AND [2] leakage started > 4 hours ago    Negative: Vaginal bleeding or spotting    Negative: Baby moving less today (e.g., kick count < 5 in 1 hour or < 10 in 2 hours)    Negative: Severe headache or headache that won't go away    Negative: New blurred vision or vision changes    Negative: MODERATE-SEVERE abdominal pain    Negative: Fever > 100.4 F (38.0 C)    Negative: [1] Leakage of fluid from vagina AND [2] leakage started < 4 hours ago    Negative: Patient sounds very sick or weak to the triager    [1] First baby (primipara) AND [2] contractions > 5 minutes apart, or for < 2 hours    Protocols used: PREGNANCY - LABOR-A-

## 2019-11-19 ENCOUNTER — ANESTHESIA EVENT (OUTPATIENT)
Dept: OBGYN | Facility: CLINIC | Age: 29
End: 2019-11-19
Payer: COMMERCIAL

## 2019-11-19 ENCOUNTER — ANESTHESIA (OUTPATIENT)
Dept: OBGYN | Facility: CLINIC | Age: 29
End: 2019-11-19
Payer: COMMERCIAL

## 2019-11-19 PROBLEM — Z98.891 S/P CESAREAN SECTION: Status: ACTIVE | Noted: 2019-11-19

## 2019-11-19 LAB — T PALLIDUM AB SER QL: NONREACTIVE

## 2019-11-19 PROCEDURE — C9290 INJ, BUPIVACAINE LIPOSOME: HCPCS | Performed by: STUDENT IN AN ORGANIZED HEALTH CARE EDUCATION/TRAINING PROGRAM

## 2019-11-19 PROCEDURE — 71000015 ZZH RECOVERY PHASE 1 LEVEL 2 EA ADDTL HR: Performed by: OBSTETRICS & GYNECOLOGY

## 2019-11-19 PROCEDURE — 37000009 ZZH ANESTHESIA TECHNICAL FEE, EACH ADDTL 15 MIN: Performed by: OBSTETRICS & GYNECOLOGY

## 2019-11-19 PROCEDURE — 27210794 ZZH OR GENERAL SUPPLY STERILE: Performed by: OBSTETRICS & GYNECOLOGY

## 2019-11-19 PROCEDURE — 25800030 ZZH RX IP 258 OP 636: Performed by: STUDENT IN AN ORGANIZED HEALTH CARE EDUCATION/TRAINING PROGRAM

## 2019-11-19 PROCEDURE — 25000128 H RX IP 250 OP 636: Performed by: STUDENT IN AN ORGANIZED HEALTH CARE EDUCATION/TRAINING PROGRAM

## 2019-11-19 PROCEDURE — 25000125 ZZHC RX 250: Performed by: OBSTETRICS & GYNECOLOGY

## 2019-11-19 PROCEDURE — 37000008 ZZH ANESTHESIA TECHNICAL FEE, 1ST 30 MIN: Performed by: OBSTETRICS & GYNECOLOGY

## 2019-11-19 PROCEDURE — 25000132 ZZH RX MED GY IP 250 OP 250 PS 637: Performed by: STUDENT IN AN ORGANIZED HEALTH CARE EDUCATION/TRAINING PROGRAM

## 2019-11-19 PROCEDURE — 40000170 ZZH STATISTIC PRE-PROCEDURE ASSESSMENT II: Performed by: OBSTETRICS & GYNECOLOGY

## 2019-11-19 PROCEDURE — 25000132 ZZH RX MED GY IP 250 OP 250 PS 637: Performed by: OBSTETRICS & GYNECOLOGY

## 2019-11-19 PROCEDURE — 59510 CESAREAN DELIVERY: CPT | Mod: GC | Performed by: OBSTETRICS & GYNECOLOGY

## 2019-11-19 PROCEDURE — 25000128 H RX IP 250 OP 636: Performed by: OBSTETRICS & GYNECOLOGY

## 2019-11-19 PROCEDURE — 36000057 ZZH SURGERY LEVEL 3 1ST 30 MIN - UMMC: Performed by: OBSTETRICS & GYNECOLOGY

## 2019-11-19 PROCEDURE — 36000059 ZZH SURGERY LEVEL 3 EA 15 ADDTL MIN UMMC: Performed by: OBSTETRICS & GYNECOLOGY

## 2019-11-19 PROCEDURE — 25000125 ZZHC RX 250: Performed by: STUDENT IN AN ORGANIZED HEALTH CARE EDUCATION/TRAINING PROGRAM

## 2019-11-19 PROCEDURE — 27110028 ZZH OR GENERAL SUPPLY NON-STERILE: Performed by: OBSTETRICS & GYNECOLOGY

## 2019-11-19 PROCEDURE — 12000001 ZZH R&B MED SURG/OB UMMC

## 2019-11-19 PROCEDURE — 71000014 ZZH RECOVERY PHASE 1 LEVEL 2 FIRST HR: Performed by: OBSTETRICS & GYNECOLOGY

## 2019-11-19 RX ORDER — BISACODYL 10 MG
10 SUPPOSITORY, RECTAL RECTAL DAILY PRN
Status: DISCONTINUED | OUTPATIENT
Start: 2019-11-21 | End: 2019-11-22 | Stop reason: HOSPADM

## 2019-11-19 RX ORDER — FENTANYL CITRATE 50 UG/ML
25-50 INJECTION, SOLUTION INTRAMUSCULAR; INTRAVENOUS
Status: DISCONTINUED | OUTPATIENT
Start: 2019-11-19 | End: 2019-11-19

## 2019-11-19 RX ORDER — MORPHINE SULFATE 1 MG/ML
INJECTION, SOLUTION EPIDURAL; INTRATHECAL; INTRAVENOUS PRN
Status: DISCONTINUED | OUTPATIENT
Start: 2019-11-19 | End: 2019-11-19

## 2019-11-19 RX ORDER — DEXAMETHASONE SODIUM PHOSPHATE 4 MG/ML
4 INJECTION, SOLUTION INTRA-ARTICULAR; INTRALESIONAL; INTRAMUSCULAR; INTRAVENOUS; SOFT TISSUE
Status: DISCONTINUED | OUTPATIENT
Start: 2019-11-19 | End: 2019-11-19 | Stop reason: HOSPADM

## 2019-11-19 RX ORDER — KETOROLAC TROMETHAMINE 30 MG/ML
30 INJECTION, SOLUTION INTRAMUSCULAR; INTRAVENOUS
Status: DISCONTINUED | OUTPATIENT
Start: 2019-11-19 | End: 2019-11-19 | Stop reason: HOSPADM

## 2019-11-19 RX ORDER — NALOXONE HYDROCHLORIDE 0.4 MG/ML
.1-.4 INJECTION, SOLUTION INTRAMUSCULAR; INTRAVENOUS; SUBCUTANEOUS
Status: DISCONTINUED | OUTPATIENT
Start: 2019-11-19 | End: 2019-11-22 | Stop reason: HOSPADM

## 2019-11-19 RX ORDER — SODIUM CHLORIDE, SODIUM LACTATE, POTASSIUM CHLORIDE, CALCIUM CHLORIDE 600; 310; 30; 20 MG/100ML; MG/100ML; MG/100ML; MG/100ML
INJECTION, SOLUTION INTRAVENOUS CONTINUOUS
Status: DISCONTINUED | OUTPATIENT
Start: 2019-11-19 | End: 2019-11-19 | Stop reason: HOSPADM

## 2019-11-19 RX ORDER — BUPROPION HYDROCHLORIDE 150 MG/1
150 TABLET ORAL DAILY
Status: DISCONTINUED | OUTPATIENT
Start: 2019-11-19 | End: 2019-11-19

## 2019-11-19 RX ORDER — HYDROMORPHONE HYDROCHLORIDE 1 MG/ML
.3-.5 INJECTION, SOLUTION INTRAMUSCULAR; INTRAVENOUS; SUBCUTANEOUS EVERY 5 MIN PRN
Status: DISCONTINUED | OUTPATIENT
Start: 2019-11-19 | End: 2019-11-19 | Stop reason: HOSPADM

## 2019-11-19 RX ORDER — LABETALOL 20 MG/4 ML (5 MG/ML) INTRAVENOUS SYRINGE
10
Status: DISCONTINUED | OUTPATIENT
Start: 2019-11-19 | End: 2019-11-19 | Stop reason: HOSPADM

## 2019-11-19 RX ORDER — NALOXONE HYDROCHLORIDE 0.4 MG/ML
.1-.4 INJECTION, SOLUTION INTRAMUSCULAR; INTRAVENOUS; SUBCUTANEOUS
Status: DISCONTINUED | OUTPATIENT
Start: 2019-11-19 | End: 2019-11-19

## 2019-11-19 RX ORDER — ONDANSETRON 2 MG/ML
4 INJECTION INTRAMUSCULAR; INTRAVENOUS EVERY 6 HOURS PRN
Status: DISCONTINUED | OUTPATIENT
Start: 2019-11-19 | End: 2019-11-22 | Stop reason: HOSPADM

## 2019-11-19 RX ORDER — ACETAMINOPHEN 325 MG/1
650 TABLET ORAL EVERY 4 HOURS PRN
Status: DISCONTINUED | OUTPATIENT
Start: 2019-11-22 | End: 2019-11-22 | Stop reason: HOSPADM

## 2019-11-19 RX ORDER — OXYTOCIN/0.9 % SODIUM CHLORIDE 30/500 ML
340 PLASTIC BAG, INJECTION (ML) INTRAVENOUS CONTINUOUS PRN
Status: DISCONTINUED | OUTPATIENT
Start: 2019-11-19 | End: 2019-11-22 | Stop reason: HOSPADM

## 2019-11-19 RX ORDER — BUPIVACAINE HYDROCHLORIDE 7.5 MG/ML
INJECTION, SOLUTION INTRASPINAL PRN
Status: DISCONTINUED | OUTPATIENT
Start: 2019-11-19 | End: 2019-11-19

## 2019-11-19 RX ORDER — ACETAMINOPHEN 325 MG/1
975 TABLET ORAL EVERY 8 HOURS
Status: DISPENSED | OUTPATIENT
Start: 2019-11-19 | End: 2019-11-22

## 2019-11-19 RX ORDER — CEFAZOLIN SODIUM 2 G/100ML
2 INJECTION, SOLUTION INTRAVENOUS
Status: COMPLETED | OUTPATIENT
Start: 2019-11-19 | End: 2019-11-19

## 2019-11-19 RX ORDER — OXYCODONE HYDROCHLORIDE 5 MG/1
5-10 TABLET ORAL
Status: DISCONTINUED | OUTPATIENT
Start: 2019-11-19 | End: 2019-11-22 | Stop reason: HOSPADM

## 2019-11-19 RX ORDER — KETOROLAC TROMETHAMINE 30 MG/ML
30 INJECTION, SOLUTION INTRAMUSCULAR; INTRAVENOUS EVERY 6 HOURS
Status: DISPENSED | OUTPATIENT
Start: 2019-11-19 | End: 2019-11-20

## 2019-11-19 RX ORDER — IBUPROFEN 800 MG/1
800 TABLET, FILM COATED ORAL EVERY 6 HOURS PRN
Status: DISCONTINUED | OUTPATIENT
Start: 2019-11-19 | End: 2019-11-19

## 2019-11-19 RX ORDER — CARBOPROST TROMETHAMINE 250 UG/ML
250 INJECTION, SOLUTION INTRAMUSCULAR
Status: DISCONTINUED | OUTPATIENT
Start: 2019-11-19 | End: 2019-11-22 | Stop reason: HOSPADM

## 2019-11-19 RX ORDER — SIMETHICONE 80 MG
80 TABLET,CHEWABLE ORAL 4 TIMES DAILY PRN
Status: DISCONTINUED | OUTPATIENT
Start: 2019-11-19 | End: 2019-11-22 | Stop reason: HOSPADM

## 2019-11-19 RX ORDER — DEXTROSE, SODIUM CHLORIDE, SODIUM LACTATE, POTASSIUM CHLORIDE, AND CALCIUM CHLORIDE 5; .6; .31; .03; .02 G/100ML; G/100ML; G/100ML; G/100ML; G/100ML
INJECTION, SOLUTION INTRAVENOUS CONTINUOUS
Status: DISCONTINUED | OUTPATIENT
Start: 2019-11-19 | End: 2019-11-22 | Stop reason: HOSPADM

## 2019-11-19 RX ORDER — DIPHENHYDRAMINE HYDROCHLORIDE 50 MG/ML
25 INJECTION INTRAMUSCULAR; INTRAVENOUS EVERY 6 HOURS PRN
Status: DISCONTINUED | OUTPATIENT
Start: 2019-11-19 | End: 2019-11-22 | Stop reason: HOSPADM

## 2019-11-19 RX ORDER — CEFAZOLIN SODIUM 1 G/3ML
1 INJECTION, POWDER, FOR SOLUTION INTRAMUSCULAR; INTRAVENOUS SEE ADMIN INSTRUCTIONS
Status: DISCONTINUED | OUTPATIENT
Start: 2019-11-19 | End: 2019-11-19 | Stop reason: HOSPADM

## 2019-11-19 RX ORDER — AMOXICILLIN 250 MG
1 CAPSULE ORAL 2 TIMES DAILY PRN
Status: DISCONTINUED | OUTPATIENT
Start: 2019-11-19 | End: 2019-11-22 | Stop reason: HOSPADM

## 2019-11-19 RX ORDER — ONDANSETRON 4 MG/1
4 TABLET, ORALLY DISINTEGRATING ORAL EVERY 30 MIN PRN
Status: DISCONTINUED | OUTPATIENT
Start: 2019-11-19 | End: 2019-11-19

## 2019-11-19 RX ORDER — IBUPROFEN 800 MG/1
800 TABLET, FILM COATED ORAL EVERY 6 HOURS PRN
Status: DISCONTINUED | OUTPATIENT
Start: 2019-11-20 | End: 2019-11-20

## 2019-11-19 RX ORDER — BUPIVACAINE HYDROCHLORIDE 2.5 MG/ML
INJECTION, SOLUTION EPIDURAL; INFILTRATION; INTRACAUDAL PRN
Status: DISCONTINUED | OUTPATIENT
Start: 2019-11-19 | End: 2019-11-19

## 2019-11-19 RX ORDER — OXYTOCIN/0.9 % SODIUM CHLORIDE 30/500 ML
100 PLASTIC BAG, INJECTION (ML) INTRAVENOUS CONTINUOUS
Status: DISCONTINUED | OUTPATIENT
Start: 2019-11-19 | End: 2019-11-22 | Stop reason: HOSPADM

## 2019-11-19 RX ORDER — NALBUPHINE HYDROCHLORIDE 10 MG/ML
5 INJECTION, SOLUTION INTRAMUSCULAR; INTRAVENOUS; SUBCUTANEOUS ONCE
Status: COMPLETED | OUTPATIENT
Start: 2019-11-19 | End: 2019-11-19

## 2019-11-19 RX ORDER — DIPHENHYDRAMINE HCL 25 MG
25 CAPSULE ORAL EVERY 6 HOURS PRN
Status: DISCONTINUED | OUTPATIENT
Start: 2019-11-19 | End: 2019-11-22 | Stop reason: HOSPADM

## 2019-11-19 RX ORDER — LIDOCAINE 40 MG/G
CREAM TOPICAL
Status: DISCONTINUED | OUTPATIENT
Start: 2019-11-19 | End: 2019-11-22 | Stop reason: HOSPADM

## 2019-11-19 RX ORDER — MAGNESIUM HYDROXIDE 1200 MG/15ML
LIQUID ORAL PRN
Status: DISCONTINUED | OUTPATIENT
Start: 2019-11-19 | End: 2019-11-22 | Stop reason: HOSPADM

## 2019-11-19 RX ORDER — AMOXICILLIN 250 MG
2 CAPSULE ORAL 2 TIMES DAILY PRN
Status: DISCONTINUED | OUTPATIENT
Start: 2019-11-19 | End: 2019-11-22 | Stop reason: HOSPADM

## 2019-11-19 RX ORDER — MISOPROSTOL 200 UG/1
800 TABLET ORAL
Status: DISCONTINUED | OUTPATIENT
Start: 2019-11-19 | End: 2019-11-22 | Stop reason: HOSPADM

## 2019-11-19 RX ORDER — OXYTOCIN 10 [USP'U]/ML
10 INJECTION, SOLUTION INTRAMUSCULAR; INTRAVENOUS
Status: DISCONTINUED | OUTPATIENT
Start: 2019-11-19 | End: 2019-11-22 | Stop reason: HOSPADM

## 2019-11-19 RX ORDER — MEPERIDINE HYDROCHLORIDE 25 MG/ML
12.5 INJECTION INTRAMUSCULAR; INTRAVENOUS; SUBCUTANEOUS EVERY 5 MIN PRN
Status: DISCONTINUED | OUTPATIENT
Start: 2019-11-19 | End: 2019-11-19 | Stop reason: HOSPADM

## 2019-11-19 RX ORDER — LANOLIN 100 %
OINTMENT (GRAM) TOPICAL
Status: DISCONTINUED | OUTPATIENT
Start: 2019-11-19 | End: 2019-11-22 | Stop reason: HOSPADM

## 2019-11-19 RX ORDER — CITRIC ACID/SODIUM CITRATE 334-500MG
30 SOLUTION, ORAL ORAL
Status: DISCONTINUED | OUTPATIENT
Start: 2019-11-19 | End: 2019-11-19 | Stop reason: HOSPADM

## 2019-11-19 RX ORDER — ONDANSETRON 2 MG/ML
4 INJECTION INTRAMUSCULAR; INTRAVENOUS EVERY 30 MIN PRN
Status: DISCONTINUED | OUTPATIENT
Start: 2019-11-19 | End: 2019-11-19

## 2019-11-19 RX ORDER — HYDRALAZINE HYDROCHLORIDE 20 MG/ML
2.5-5 INJECTION INTRAMUSCULAR; INTRAVENOUS EVERY 10 MIN PRN
Status: DISCONTINUED | OUTPATIENT
Start: 2019-11-19 | End: 2019-11-19 | Stop reason: HOSPADM

## 2019-11-19 RX ORDER — PHENYLEPHRINE HCL IN 0.9% NACL 1 MG/10 ML
SYRINGE (ML) INTRAVENOUS CONTINUOUS PRN
Status: DISCONTINUED | OUTPATIENT
Start: 2019-11-19 | End: 2019-11-19

## 2019-11-19 RX ORDER — HYDROCORTISONE 2.5 %
CREAM (GRAM) TOPICAL 3 TIMES DAILY PRN
Status: DISCONTINUED | OUTPATIENT
Start: 2019-11-19 | End: 2019-11-22 | Stop reason: HOSPADM

## 2019-11-19 RX ADMIN — PHENYLEPHRINE HYDROCHLORIDE 50 MCG: 10 INJECTION INTRAVENOUS at 01:27

## 2019-11-19 RX ADMIN — CEFAZOLIN SODIUM 2 G: 2 INJECTION, SOLUTION INTRAVENOUS at 01:26

## 2019-11-19 RX ADMIN — BUPROPION HYDROCHLORIDE 150 MG: 150 TABLET ORAL at 07:49

## 2019-11-19 RX ADMIN — NALBUPHINE HYDROCHLORIDE 5 MG: 10 INJECTION, SOLUTION INTRAMUSCULAR; INTRAVENOUS; SUBCUTANEOUS at 04:31

## 2019-11-19 RX ADMIN — SODIUM CHLORIDE, POTASSIUM CHLORIDE, SODIUM LACTATE AND CALCIUM CHLORIDE 1000 ML: 600; 310; 30; 20 INJECTION, SOLUTION INTRAVENOUS at 00:25

## 2019-11-19 RX ADMIN — BUPIVACAINE HYDROCHLORIDE IN DEXTROSE 1.6 ML: 7.5 INJECTION, SOLUTION SUBARACHNOID at 01:24

## 2019-11-19 RX ADMIN — KETOROLAC TROMETHAMINE 30 MG: 30 INJECTION, SOLUTION INTRAMUSCULAR at 14:23

## 2019-11-19 RX ADMIN — SODIUM CITRATE AND CITRIC ACID MONOHYDRATE 30 ML: 500; 334 SOLUTION ORAL at 00:59

## 2019-11-19 RX ADMIN — BUPIVACAINE 20 ML: 13.3 INJECTION, SUSPENSION, LIPOSOMAL INFILTRATION at 02:55

## 2019-11-19 RX ADMIN — KETOROLAC TROMETHAMINE 30 MG: 30 INJECTION, SOLUTION INTRAMUSCULAR at 07:50

## 2019-11-19 RX ADMIN — FENTANYL CITRATE 10 MCG: 50 INJECTION, SOLUTION INTRAMUSCULAR; INTRAVENOUS at 01:24

## 2019-11-19 RX ADMIN — ONDANSETRON 4 MG: 2 INJECTION INTRAMUSCULAR; INTRAVENOUS at 01:45

## 2019-11-19 RX ADMIN — Medication 50 MCG/MIN: at 01:25

## 2019-11-19 RX ADMIN — OXYTOCIN-SODIUM CHLORIDE 0.9% IV SOLN 30 UNIT/500ML 600 ML/HR: 30-0.9/5 SOLUTION at 01:45

## 2019-11-19 RX ADMIN — SENNOSIDES AND DOCUSATE SODIUM 2 TABLET: 8.6; 5 TABLET ORAL at 21:44

## 2019-11-19 RX ADMIN — Medication 100 ML/HR: at 05:46

## 2019-11-19 RX ADMIN — LAMOTRIGINE 100 MG: 100 TABLET ORAL at 22:16

## 2019-11-19 RX ADMIN — BUPIVACAINE HYDROCHLORIDE 20 ML: 2.5 INJECTION, SOLUTION EPIDURAL; INFILTRATION; INTRACAUDAL at 02:55

## 2019-11-19 RX ADMIN — AZITHROMYCIN MONOHYDRATE 500 MG: 500 INJECTION, POWDER, LYOPHILIZED, FOR SOLUTION INTRAVENOUS at 01:30

## 2019-11-19 RX ADMIN — MORPHINE SULFATE 0.15 MG: 1 INJECTION, SOLUTION EPIDURAL; INTRATHECAL; INTRAVENOUS at 01:24

## 2019-11-19 RX ADMIN — LEVOTHYROXINE SODIUM 50 MCG: 50 TABLET ORAL at 07:24

## 2019-11-19 RX ADMIN — KETOROLAC TROMETHAMINE 30 MG: 30 INJECTION, SOLUTION INTRAMUSCULAR at 21:45

## 2019-11-19 RX ADMIN — DIPHENHYDRAMINE HYDROCHLORIDE 25 MG: 25 CAPSULE ORAL at 21:44

## 2019-11-19 RX ADMIN — SODIUM CHLORIDE, POTASSIUM CHLORIDE, SODIUM LACTATE AND CALCIUM CHLORIDE: 600; 310; 30; 20 INJECTION, SOLUTION INTRAVENOUS at 01:08

## 2019-11-19 NOTE — PROGRESS NOTES
[facility] PROGRESS NOTE:   2019 9:22 PM         SUBJECTIVE:   Patient complains of cramping.    Contractions:  q 2-3 minutes  Leakage of fluid:  No  Vaginal bleeding:  No  Pain controlled:  Yes  Denies headache, visual changes, RUQ/epigastric pain         OBJECTIVE:     Vitals:    19 0843 19 1200 19 1535 19 1917   BP: 137/85 119/67 (!) 149/86 (!) 140/89   Pulse: (P) 58      Resp:     Temp:  98.7  F (37.1  C) 98.5  F (36.9  C) 98.9  F (37.2  C)   TempSrc:  Oral Oral Oral   Weight:       Height:             NST:  Fetal Heart Rate Tracin bpm baseline, mod variability, + accels, rare variable and late decels  Category 2    Tocometer: q 2-3 minutes    Gen: alert, oriented, no distress  Abdomen:  Gravid, nontender  Cervix:   Dilation: 1   Effacement: 50%   Station:-4   Consistency: average   Position: Posterior    Transcervical amezcua balloon placed with 60mL of sterile water. Patient tolerated procedure well.          LABS:     Recent Results (from the past 12 hour(s))   ALT    Collection Time: 19 11:17 AM   Result Value Ref Range    ALT 20 0 - 50 U/L   AST    Collection Time: 19 11:17 AM   Result Value Ref Range    AST 24 0 - 45 U/L   Creatinine    Collection Time: 19 11:17 AM   Result Value Ref Range    Creatinine 0.81 0.52 - 1.04 mg/dL    GFR Estimate >90 >60 mL/min/[1.73_m2]    GFR Estimate If Black >90 >60 mL/min/[1.73_m2]   ABO/Rh type and screen    Collection Time: 19 11:17 AM   Result Value Ref Range    ABO A     RH(D) Pos     Antibody Screen Neg     Test Valid Only At          [facility]    Specimen Expires 2019    CBC with platelets differential    Collection Time: 19 11:17 AM   Result Value Ref Range    WBC 12.9 (H) 4.0 - 11.0 10e9/L    RBC Count 4.21 3.8 - 5.2 10e12/L    Hemoglobin 13.7 11.7 - 15.7 g/dL    Hematocrit 40.2 35.0 - 47.0 %    MCV 96 78 - 100 fl     MCH 32.5 26.5 - 33.0 pg    MCHC 34.1 31.5 - 36.5 g/dL    RDW 12.6 10.0 - 15.0 %    Platelet Count 148 (L) 150 - 450 10e9/L    Diff Method Automated Method     % Neutrophils 76.1 %    % Lymphocytes 11.1 %    % Monocytes 11.7 %    % Eosinophils 0.5 %    % Basophils 0.2 %    % Immature Granulocytes 0.4 %    Nucleated RBCs 0 0 /100    Absolute Neutrophil 9.9 (H) 1.6 - 8.3 10e9/L    Absolute Lymphocytes 1.4 0.8 - 5.3 10e9/L    Absolute Monocytes 1.5 (H) 0.0 - 1.3 10e9/L    Absolute Eosinophils 0.1 0.0 - 0.7 10e9/L    Absolute Basophils 0.0 0.0 - 0.2 10e9/L    Abs Immature Granulocytes 0.1 0 - 0.4 10e9/L    Absolute Nucleated RBC 0.0    Protein  random urine with Creat Ratio    Collection Time: 19 12:50 PM   Result Value Ref Range    Protein Random Urine <0.05 g/L    Protein Total Urine g/gr Creatinine Unable to calculate due to low value 0 - 0.2 g/g Cr   Creatinine urine calculation only    Collection Time: 19 12:50 PM   Result Value Ref Range    Creatinine Urine 36 mg/dL              ASSESSMENT / PLAN:   29 year old  at 37w2d admitted for IOL for gestational hypertension.    Gestational hypertension: normal to mild range BPs  Labor: cervix not favorable after 2 doses of vaginal misoprostol. Eduardo too frequently for more misoprostol at this time. Rabago bulb placed with 60mL sterile water.   Fetal well being: Category 2 tracing. Moderate variability.  GBS: positive, plan PCN in active labor.  Pain: received fentanyl 50mcg prior to Rabago bulb placement  Anticipate .    Zuri Cisneros MD

## 2019-11-19 NOTE — PLAN OF CARE
Informed Dr. Cisneros of decelerations. Patient was ambulating in halls when first decel occurred. Asked patient to return to room and lie on her side. Dr. Cisneros came to bedside and recommended IV fluid bolus to be given. Patient requested to hold off on the IV fluids. Dr. Cisneros recommended O2 therapy if IV fluids were not going to be given. Patient agreed. O2 therapy initiated.

## 2019-11-19 NOTE — PROGRESS NOTES
S: Category 2 fetal heart rate strip noted. Strip reviewed at bedside.  I came to the bedside to review with the RN.     O: Baseline rate normal 150  Variability moderate  Accelerations not present  Decelerations present  Type of deceleration variable and late  Deceleration frequency intermittent     FHR was 145bpm baseline, moderate variability, no accels, recurrent late decels x5  Then patient got up to the bathroom and there was loss of tracing and intermittent picking up possible maternal versus fetal heart rate. Patient unable to void. When she was back on the monitor in bed, a prolonged decel down to 60bpm for 2 minutes was noted. Maternal position changes, IV fluid bolus and O2 implemented. 20mL of fluid was removed from the amezcua bulb. One late deceleration, then no further decels.     Assessment: EFM interpretation suggests absence of concern for fetal metabolic acidemia at this time due to moderate variability    Uterine Activity normal/regular. Every 2-4 min    The interventions currently taken to improve the fetal heart rate tracing and fetal oxygenation are: maternal positioning, IV fluid bolus  and maternal oxygen mask    A:   29 year old  at 37w2d with category II tracing.  IOL for gestational htn. S/p 2 doses of vaginal miso. Amezcua bulb in place with 40mL.    P: Per the category II algorithm, the plan is to continue observe/conservative management. Reevaluate in 30 minutes.       Discussed  section with patient. Discussed indications for  section. Discussed procedure and risks. Discussed indications for STAT CS and procedure for that including general anesthesia.     Zuri Cisneros MD

## 2019-11-19 NOTE — PROGRESS NOTES
Called to assess infant at approximately 15 minutes of age due to low saturations and work of breathing.     Upon arrival to the OR, infant noted to be lying on an open warmer.  She was pink in room air.  Oxygen saturations were 95-98% when the pulse oximeter was picking up and there was a consistent waveform.  Lung sounds clear bilaterally.  No work of breathing noted.  Ok for infant to remain with parents at this time.  Please call the NICU if desaturations recur or if increase work of breathing.      ISAIAS Jolly, [facility] 11/19/2019 2:04 AM

## 2019-11-19 NOTE — OP NOTE
[facility]  Full Operative Progress Note      Patient:   Mechelle Mata  MRN:    [id number]     Surgery Date:              2019   Surgeon:                      Zuri Cisneros MD   Assistants:                  Garima Mix MD PGY 2                             Pre-op Diagnosis:                     1. Intrauterine pregnancy at 37w3d   2. Gestational hypertension  3. Category 2 tracing remote from delivery      Post-op Diagnosis:                   1. Same   2. Liveborn female infant      Procedure:                    Primary low-transverse  section with double layer uterine closure via Pfannensteil incision     Anesthesia:   Spinal  QBL:                 690 mL  IVF:                   200 mL crystalloid  UOP:                50 mL clear urine at the end of the case  Drains:   Amezcua catheter   Specimens:                 cord blood, cord gases  Complications:       None apparent     Indications:   Mechelle Mata is a 29 year old  at 37w3d admitted for induction of labor for gestational hypertension.  cervical ripening with vaginal misoprostol x2, then amezcua balloon. Intermittent category 2 tracing that recovered with interventions, then category 2 tracing that did not recover with interventions - minimal variability with recurrent late decelerations. Delivery by  section recommended. The risks, benefits, and alternatives of  section were discussed with the patient, and she agreed to proceed.      Findings:   1. Clear amniotic fluid  2. Liveborn female infant in OA presentation. Apgars 7 at 1 minute & 9 at 5 minutes. Weight 2722g (6lb).  3. Arterial cord pH 7.20, base deficit 6.4. Venous cord pH 7.27, base deficit 3.2.  4. Normal uterus, fallopian tubes, and ovaries.      Procedure Details:   The patient was brought to the OR, where adequate spinal anesthesia was administered.  She was placed in the dorsal supine position with a slight leftward tilt. She was  prepped and draped in the usual sterile fashion. Ancef 2g and azithromycin 500mg IV were administered for antibiotic prophylaxis. A surgical time out was performed.     A pfannenstiel skin incision was made with the scalpel, and carried down to the underlying fascia with sharp and blunt dissection. The fascia was incised in the midline, and the incision was extended laterally with the Esquivel scissors. The superior aspect of the fascia was grasped with the Kocher clamps and dissected off of the underlying rectus muscles with blunt and sharp dissection. Attention was then turned to the inferior aspect of the fascia, which was similarly dissected off of the underlying rectus muscles. The rectus muscles were  in the midline, and the peritoneum was entered bluntly, and the opening was extended with digital pressure. The bladder blade was placed. A transverse hysterotomy was made with the scalpel in the lower uterine segment, and the incision was extended with digital pressure. The infant was noted to be in the OA position, and was delivered atraumatically. The shoulders delivered easily.  No nuchal cord was noted. After 60s delay, the cord was doubly clamped and cut, and the infant was handed off to the awaiting NICU staff. A segment of cord was cut and sent for gases. The placenta was delivered with gentle traction on the umbilical cord and uterine massage. The uterus was NOT exteriorized and cleared of all clots and debris. Uterine tone was noted to be firm with 20 units of pitocin given through the running IV and uterine massage.  The hysterotomy was closed with a running locked suture of 0 Vicryl.  The hysterotomy was then imbricated using an 0 Vicryl suture. The hysterotomy was noted to be hemostatic. The pericolic gutters were cleared of all clots and debris. The hysterotomy was reexamined and noted to be hemostatic. The fascia and rectus muscles were examined and areas of oozing were controlled with  electrocautery.  The fascia was closed with a running 0 Vicryl suture. The subcutaneous tissue was irrigated and areas of oozing were controlled with electrocautery. The subcutaneous tissue was less 2 cm in thickness, and was therefore it was not closed. The skin was closed with 4-0 monocryl in a subcuticular fashion and covered with steristrips and a sterile dressing. All sponge, needle, and instrument counts were correct. The patient tolerated the procedure well, and was transferred to recovery in stable condition. I was scrubbed and present for entire procedure.    Zuri Cisneros MD

## 2019-11-19 NOTE — PLAN OF CARE
Data: BPs mildly elevated, hx gestation htn. Postpartum checks within normal limits - see flow record. Patient eating and drinking normally. Patient able to empty bladder independently and is up ambulating. No apparent signs of infection. Incision healing well. Patient performing self cares and is visiting infant in NICU.  Action: Patient medicated during the shift for pain and cramping. See MAR. Patient reassessed within 1 hour after each medication and pain was improved - patient stated she was comfortable. Patient education done about postpartum cares, activity restrictions, pumping frequncy. See flow record.  Response: Positive attachment behaviors observed with infant in NICU. Support persons Brett present.   Plan: Anticipate discharge on POD 2-3.

## 2019-11-19 NOTE — PLAN OF CARE
Data: Pt to OB PACU at 0219 via cart. PIV infusing without complications, amezcua with clear,yellow urine to gravity, pt denies any pain, denies any nausea and vomiting.  Interventions: IV to pump, monitors and alarms on, SCD on.  Response: stable.  Plan: Patient instructed to notify RN for pain or nausea, routine post op cares, initiate breastfeeding/pumping as soon as patient/infant able.

## 2019-11-19 NOTE — PLAN OF CARE
Data: Mechelle Mata transferred to [id number] via cart at 0500. Baby transferred via isolette to NICU for observation due to low oxygen saturations.  Action: Receiving unit notified of transfer: Yes. Patient and family notified of room change. Report given to Herlinda at 0445. Belongings sent to receiving unit. Accompanied by Registered Nurse. Oriented patient to surroundings. Call light within reach.  Response: Patient tolerated transfer and is stable.

## 2019-11-19 NOTE — PROVIDER NOTIFICATION
11/19/19 0020   Provider Notification   Provider Name/Title Dr. Cisneros   Method of Notification Electronic Page   Request Evaluate in Person   Notification Reason Decels     Paged Dr. Cisneros to update on decelerations of FHR.

## 2019-11-19 NOTE — PROGRESS NOTES
S: I was notified by labor RN of persistent category II fetal heart rate tracing for 20 minutes.  Strip reviewed at bedside.  I came to the bedside to review with the RN.     O: Baseline rate normal  Variability minimal  Accelerations not present  Decelerations present  Type of deceleration late  Deceleration frequency recurrent    Assessment: EFM interpretation suggests concern for fetal metabolic acidemia at this time due to minimal variability and absence of accelerations    Uterine Activity normal/regular.    The interventions currently taken to improve the fetal heart rate tracing and fetal oxygenation are: maternal positioning, IV fluid bolus  and maternal oxygen mask    A: Persistent category II tracing.    P: Per the category II algorithm, the proceed to  section or operative vaginal birth .   Recommend delivery by  section - urgent. Discussed risks of  section. Risks include but are not limited to infection that may need antibiotics to treat, injury to other organs, risk of excessive blood loss and potential need for blood transfusion. Patient consents to transfusion of blood products if indicated. Discussed risks of anesthesia and DVT. Verbal and written consent obtained.    Zuri Cisneros MD

## 2019-11-19 NOTE — ANESTHESIA PROCEDURE NOTES
Spinal/LP Procedure Note    Spinal Block  Date/Time: 11/19/2019 1:24 AM  Staff:     Anesthesiologist:  Kvng Stephens DO    Resident/CRNA:  Judith Clark MD    Spinal/LP performed by resident/CRNA in presence of a teaching physician.    Location: In OR BEFORE Induction  Procedure Start/Stop Times:      11/19/2019 1:24 AM    patient identified, IV checked, site marked, risks and benefits discussed, informed consent, monitors and equipment checked, pre-op evaluation, at physician/surgeon's request and post-op pain management      Correct Patient: Yes      Correct Position: Yes      Correct Site: Yes      Correct Procedure: Yes      Correct Laterality:  Yes    Site Marked:  Yes  Procedure:     Procedure:  Intrathecal    ASA:  2    Diagnosis:  Single IUP    Position:  Sitting    Sterile Prep: chloraprep, mask, sterile gloves and patient draped      Insertion site:  L3-4    Approach:  Midline    Needle Type:  Jade    Needle gauge (G):  22    Local Skin Infiltration:  1% lidocaine    amount (ml):  3    Needle Length (in):  3.5    Introducer used: Yes      Introducer gauge:  20 G    Attempts:  1    Redirects:  1    CSF:  Clear    Paresthesias:  No    Time injected:  01:24

## 2019-11-19 NOTE — PROVIDER NOTIFICATION
19 2196   Provider Notification   Provider Name/Title Dr. Cisneros   Method of Notification At Bedside   Request Evaluate in Person   Notification Reason Patient Request     Dr. Cisneros at bedside per patient request to answer questions regarding decelerations in fetal heart rate and discussion of possible  section. Patient and support persons questions answered and  acknowledged. Reassured positioning changes are okay, we will continue to monitor baby.

## 2019-11-19 NOTE — CONSULTS
"[facility]  MATERNAL CHILD HEALTH   INITIAL NICU PSYCHOSOCIAL ASSESSMENT     DATA:     Reason for Social Work Consult:  admitted to NICU.   SW met with pt earlier today. No immediate concerns noted.   Parents processing recent delivery as they state \"nothing went as planned.\"    Presenting Information: Pt delivered daughter, Suzanne at 37.2 weeks gestation via  following induction of labor. Baby admitted to the NICU on  for respiratory distress. Parents are Mechelle and Brett. Mom is a .      Living Situation: Parents live together in [place].     Family Constellation: Parents are . Suzanne is their first child.     Social Support: Extended family and friend support available.     Employment: Both parents are employed full time. 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 Brett reports to have \"minimal stress\" associated with time off from work. It is unclear to him at this time when he will return to work.      Insurance: Commercial insurance, type unknown at this time.     Source of Financial Support: employment income.      Mental Health History: Mechelle reports a history of anxiety and depression. She reports that her anxiety symptoms had increased prior to getting pregnant. She attributes this to the circumstances of being uncertainty with her cycles and planning for a child. She states that her symptoms throughout pregnancy were minimal. She has continued to have contact with a psychiatrist and be on medication.     Brett states that he does not have any diagnosed mental health needs but does have \"a tendency to get flustered.\"    Current Coping: Parents indicate their preference was to have an  unmedicated, minimal interventional birth. Parents are communicating with each other and processing the unexpected events that took place with Mechelle's labor and delivery. They acknowledge they have received minimal sleep at time of SW visit.     Chemical Health History: no " "indication of chemical health history.     Community Resources//Baby Supplies: Deferred.      INTERVENTION:       SW completed chart review and collaborated with the multidisciplinary team.     Psychosocial Assessment     Introduction to Maternal Child Health Social Work and job share role with SANNA Mata and scope of practice     Provided \"Meeting Your Basic Needs While Your Child is Hospitalized\" hand out and SW business card     Orientation to the NICU parking, rooms, rounding teams, visitation, NICU badges    Reviewed Hospital and Community Resources     Assessed Mental Health History and Current Symptoms     Identified stressors, barriers and family concerns     Provided supportive counseling. Active empathetic listening and validation.     Provided psychoeducation on  mood and anxiety disorders, assessed for any current symptoms or history    ASSESSMENT:     Coping: adequate: parents are communicating with each other.     Affect: appropriate,flat, full range: FOB appears tearful at appropriate times during SW visit. Mom appears somewhat flat; parents able to engage in humor at times during conversation.     Mood:  appropriate, full range    Motivation/Ability to Access Services: Highly motivated, independent in accessing services    Assessment of Support System: stable,  involved,  appropriate, adequate    Level of engagement with SW: They appeared open to and appreciative of ongoing therapeutic support, advocacy, and connection with resources.   Engaged and appropriate. Able to seek out SW when needs arise.     Family s understanding of baby s medical situation: appropriate understanding    Family and parent/infant interactions: (attentiveness to baby, interactions between parents)  Parents seem supportive of each other and are bonding with pt as they are able.     Assessment of parental risk for PMAD:   Higher than average risk given traumatic delivery, unexpected NICU admission. "     Strengths: caring family, willingness to accept help, able to ask questions for clarification and seek support as needed.     Vulnerabilities: Parents acknowledge a concern that medical providers may have a bias towards medical intervention.     Identified Barriers: None at this time     PLAN:       SW to providecommunity resource postcard for [facility] [place] ([facility]).    SW will continue to follow throughout pt's Maternal-Child Health Journey as needs arise. SW will continue to collaborate with the multidisciplinary team.    Kjerstin Rydeen, [facility]   Social Worker  Maternal Child Health   Direct: [phone]  Pager: [phone]

## 2019-11-19 NOTE — PLAN OF CARE
Patient arrived to [facility] unit via zoom cart at 0510,with belongings, accompanied by spouse/ significant other, with infant in NICU. Received report from CELE Leonardo. Unit and room orientation completd. Call light given; no concerns present at this time. Continue with plan of care.

## 2019-11-19 NOTE — PLAN OF CARE
Patient vital signs stable with expection of high blood pressures. Pressures 140s/90s. Patient denies headache, visual disturbances, epigastric pain, etc.  Postpartum assessment within normal limits - see flow record. Fundus firm and midline. Lochia scant/light. Patient declining pain management. Rabago cath in place. Urine output adequate. UTV incision. Dressing clean, dry, and intact. Patient shown pumping, pumped. Continue with plan of care

## 2019-11-19 NOTE — PLAN OF CARE
BP mild range. Dr. Cisneros aware. Patient denies HA, vision changes, RUQ pain. Rabago placed at 2110 with 60 ml. Dose of fentanyl given prior to and after placement (see MAR). Ctxing Q 2-4 minutes palpating mild. Rabago decreased to 40 mL due to patient unable to void and having significant discomfort at 2220. Significant decelerations occurring when patient upright to bathroom. IV fluid bolus (500 ml), O2 and repositioning for intrauterine resuscitation resolved decelerations. Dr. Cisneros at bedside during significant decels on both occassions (see flowsheet). Patient educated on C/S by Dr. Cisneros in case of a STAT one needed. Anesthesia also at bedside to discuss their role in C/S and to answer patient questions. Continue to closely monitor.

## 2019-11-19 NOTE — PROGRESS NOTES
[facility] PROGRESS NOTE:   2019 11:29 PM         SUBJECTIVE:   Another decel when patient up to the bathroom.  Cramping not as bad         OBJECTIVE:     Vitals:    19 0843 19 1200 19 1535 19 1917   BP: 137/85 119/67 (!) 149/86 (!) 140/89   Pulse: (P) 58      Resp:     Temp:  98.7  F (37.1  C) 98.5  F (36.9  C) 98.9  F (37.2  C)   TempSrc:  Oral Oral Oral   Weight:       Height:             NST:  Fetal Heart Rate Tracin bpm baseline, mod variability, no accels, 3 variable decels in a row down to yue 60bpm, resolved with position change  Category 2    Tocometer: q 2-4 minutes    Gen: alert, oriented, no distress  Abdomen:  gravid  Cervix:   Dilation: 1.5   Amezcua catheter in place         LABS:     Recent Results (from the past 12 hour(s))   Protein  random urine with Creat Ratio    Collection Time: 19 12:50 PM   Result Value Ref Range    Protein Random Urine <0.05 g/L    Protein Total Urine g/gr Creatinine Unable to calculate due to low value 0 - 0.2 g/g Cr   Creatinine urine calculation only    Collection Time: 19 12:50 PM   Result Value Ref Range    Creatinine Urine 36 mg/dL              ASSESSMENT / PLAN:   29 year old  at 37w2d admitted for IOL for gestational htn.    Labor: amezcua in place (40mL)   Fetal well being: Category 2 tracing due to intermittent variable decels  GBS: pos  Pain: expectant  Anticipate  but discussed possibility of CS if persistent category 2 tracing not responsive to resuscitative measures. Many questions about labor course and c-sections.    Zuri Cisneros MD

## 2019-11-19 NOTE — ANESTHESIA PREPROCEDURE EVALUATION
Anesthesia Pre-Procedure Evaluation    Patient: Mechelle Mata   MRN:     [id number] Gender:   female   Age:    29 year old :      1990        Preoperative Diagnosis: * No pre-op diagnosis entered *   Procedure(s):   SECTION     Past Medical History:   Diagnosis Date     Depressive disorder      Thyroid disease      Uncomplicated asthma       Past Surgical History:   Procedure Laterality Date     C EXCISION OF GANGLION CYST FOREARM Left 2006    wrist     wisdom teeth            Anesthesia Evaluation     .             ROS/MED HX    ENT/Pulmonary:     (+)Intermittent asthma , . .    Neurologic:  - neg neurologic ROS     Cardiovascular:         METS/Exercise Tolerance:     Hematologic:         Musculoskeletal:         GI/Hepatic:  - neg GI/hepatic ROS       Renal/Genitourinary:  - ROS Renal section negative       Endo:     (+) thyroid problem .      Psychiatric:     (+) psychiatric history anxiety and depression      Infectious Disease:  - neg infectious disease ROS       Malignancy:      - no malignancy   Other:                     RAFIAG FV AN PHYSICAL EXAM    LABS:  CBC:   Lab Results   Component Value Date    WBC 12.9 (H) 2019    WBC 10.2 2019    HGB 13.7 2019    HGB 13.9 2019    HCT 40.2 2019    HCT 41.1 2019     (L) 2019     (L) 2019     BMP:   Lab Results   Component Value Date    CR 0.81 2019    CR 0.66 2019     COAGS: No results found for: PTT, INR, FIBR  POC:   Lab Results   Component Value Date    HCGS Positive (A) 2019     OTHER:   Lab Results   Component Value Date    ALT 20 2019    AST 24 2019    TSH 3.10 2019    T4 0.91 2019        Preop Vitals    BP Readings from Last 3 Encounters:   19 129/70   19 (!) 144/84   19 132/83    Pulse Readings from Last 3 Encounters:   19 59   19 71   19 83      Resp Readings from Last 3 Encounters:   19 16   19 18  "   SpO2 Readings from Last 3 Encounters:   11/08/19 96%   09/23/19 98%   04/11/19 99%      Temp Readings from Last 1 Encounters:   11/18/19 37.1  C (98.7  F) (Oral)    Ht Readings from Last 1 Encounters:   11/18/19 1.727 m (5' 8\")      Wt Readings from Last 1 Encounters:   11/18/19 88.9 kg (196 lb)    Estimated body mass index is 29.8 kg/m  as calculated from the following:    Height as of this encounter: 1.727 m (5' 8\").    Weight as of this encounter: 88.9 kg (196 lb).     LDA:  Peripheral IV 11/18/19 Left Hand (Active)   Site Assessment WDL 11/18/2019 10:00 PM   Line Status Saline locked 11/18/2019 10:00 PM   Phlebitis Scale 0-->no symptoms 11/18/2019 10:00 PM   Infiltration Scale 0 11/18/2019 10:00 PM   Number of days: 1        Assessment:   ASA SCORE: 2    H&P: History and physical reviewed and following examination; no interval change.   Smoking Status:  Non-Smoker/Unknown   NPO Status: NPO Appropriate     Plan:   Anes. Type:  Spinal; MAC   Pre-Medication: None   Induction:  N/a   Airway: Native Airway   Access/Monitoring: PIV   Maintenance: N/a     Postop Plan:   Postop Pain: Regional  Postop Sedation/Airway: Not planned     PONV Management:   Adult Risk Factors: Female, Non-Smoker   Prevention: Ondansetron     CONSENT: Direct conversation   Plan and risks discussed with: Patient   Blood Products: Consented (ALL Blood Products)                   Judith Khanna MD  "

## 2019-11-19 NOTE — ANESTHESIA PROCEDURE NOTES
Peripheral Nerve Block Procedure Note  Date/Time: 11/19/2019 2:55 AM    Staff:     Anesthesiologist:  Kvng Stephens DO    Resident/CRNA:  Judith Clark MD    Block performed by resident/CRNA in the presence of a teaching physician    Location: PACU  Procedure Start/Stop TImes:      11/19/2019 3:00 AM    patient identified, IV checked, site marked, risks and benefits discussed, informed consent, monitors and equipment checked, pre-op evaluation, at physician/surgeon's request and post-op pain management      Correct Patient: Yes      Correct Position: Yes      Correct Site: Yes      Correct Procedure: Yes      Correct Laterality:  Yes    Site Marked:  Yes  Procedure details:     Procedure:  TAP    ASA:  2    Diagnosis:  Postop pain control.     Laterality:  Bilateral    Position:  Supine    Sterile Prep: chloraprep, mask and sterile gloves      Local skin infiltration:  None    Needle:  Short bevel    Needle gauge:  21    Needle length (mm):  110    Ultrasound: Yes      Ultrasound used to identify targeted nerve, plexus, or vascular structure and placed a needle adjacent to it      Permanent Image entered into patiient's record      Abnormal pain on injection: No      Blood Aspirated: No      Paresthesias:  No    Bleeding at site: No      Bolus via:  Needle    Infusion Method:  Single Shot    Complications:  None

## 2019-11-19 NOTE — PROVIDER NOTIFICATION
19 0055   Provider Notification   Provider Name/Title Dr. Cisneros   Method of Notification At Bedside   Request Evaluate in Person   Notification Reason Labor Status     Dr. Cisneros at bedside. Consent for  section signed, anesthesia informed and on the way to room. Pt prepped for OR.

## 2019-11-19 NOTE — ANESTHESIA CARE TRANSFER NOTE
Patient: Mechelle Mata    Procedure(s):   SECTION    Diagnosis: * No pre-op diagnosis entered *  Diagnosis Additional Information: No value filed.    Anesthesia Type:   No value filed.     Note:  Airway :Room Air  Patient transferred to:PACU  Comments: VSS. Breathing spontaneously at a regular rate with adequate tidal volumes and maintaining O2 sats on room air. Denies nausea or pain. No apparent complications from anesthesia.  BL TAP block to be done in PACU    Judith Shankar MD  CA1 Anesthesia Resident  Handoff Report: Identifed the Patient, Identified the Reponsible Provider, Reviewed the pertinent medical history, Discussed the surgical course, Reviewed Intra-OP anesthesia mangement and issues during anesthesia, Set expectations for post-procedure period and Allowed opportunity for questions and acknowledgement of understanding      Vitals: (Last set prior to Anesthesia Care Transfer)    CRNA VITALS  2019 0149 - 2019 0230      2019             Pulse:  76    SpO2:  99 %                Electronically Signed By: Judith Khanna MD  2019  2:30 AM

## 2019-11-19 NOTE — PROGRESS NOTES
Called to attend the delivery due to minimal variability and need for  section.  Infant delivered with spontaneous cry and respirations.  NICU team not needed and dismissed.     ISAIAS Jolly, [facility] 2019 1:46 AM

## 2019-11-19 NOTE — ANESTHESIA POSTPROCEDURE EVALUATION
Anesthesia POST Procedure Evaluation    Patient: Mechelle Mata   MRN:     [id number] Gender:   female   Age:    29 year old :      1990        Preoperative Diagnosis: * No pre-op diagnosis entered *   Procedure(s):   SECTION   Postop Comments: No value filed.       Anesthesia Type:  Not documented  No value filed.    Reportable Event: NO     PAIN: Uncomplicated   Sign Out status: Comfortable, Well controlled pain     PONV: No PONV   Sign Out status:  No Nausea or Vomiting     Neuro/Psych: Uneventful perioperative course   Sign Out Status: Preoperative baseline; Age appropriate mentation     Airway/Resp.: Uneventful perioperative course   Sign Out Status: Non labored breathing, age appropriate RR; Resp. Status within EXPECTED Parameters     CV: Uneventful perioperative course   Sign Out status: Appropriate BP and perfusion indices; Appropriate HR/Rhythm     Disposition:   Sign Out in:  Labor and Delivery  Disposition:  Floor  Recovery Course: Uneventful  Follow-Up: Not required           Last Anesthesia Record Vitals:  CRNA VITALS  2019 0149 - 2019 0249      2019             Pulse:  76    SpO2:  99 %          Last PACU Vitals:  Vitals Value Taken Time   /86 2019  5:40 AM   Temp 36.6  C (97.8  F) 2019  5:40 AM   Pulse 67 2019  4:50 AM   Resp 18 2019  5:40 AM   SpO2 96 % 2019  4:53 AM   Temp src     NIBP     Pulse     SpO2     Resp     Temp     Ht Rate     Temp 2     Vitals shown include unvalidated device data.      Electronically Signed By: Kvng Stephens DO, 2019, 6:40 AM

## 2019-11-19 NOTE — PROVIDER NOTIFICATION
19 0025   Provider Notification   Provider Name/Title Dr. Cisneros   Method of Notification At Bedside   Request Evaluate in Person   Notification Reason Decels     Dr. Cisneros at bedside with patient. Discuss IV bolus administration and oxygen application. Evaluate for 10 minutes, and discuss need for  in the next 30 minutes if recurrent late decelerations continue.

## 2019-11-20 LAB — HGB BLD-MCNC: 12.1 G/DL (ref 11.7–15.7)

## 2019-11-20 PROCEDURE — 36415 COLL VENOUS BLD VENIPUNCTURE: CPT | Performed by: STUDENT IN AN ORGANIZED HEALTH CARE EDUCATION/TRAINING PROGRAM

## 2019-11-20 PROCEDURE — 85018 HEMOGLOBIN: CPT | Performed by: STUDENT IN AN ORGANIZED HEALTH CARE EDUCATION/TRAINING PROGRAM

## 2019-11-20 PROCEDURE — 25000132 ZZH RX MED GY IP 250 OP 250 PS 637: Performed by: STUDENT IN AN ORGANIZED HEALTH CARE EDUCATION/TRAINING PROGRAM

## 2019-11-20 PROCEDURE — 12000001 ZZH R&B MED SURG/OB UMMC

## 2019-11-20 PROCEDURE — 25000132 ZZH RX MED GY IP 250 OP 250 PS 637: Performed by: OBSTETRICS & GYNECOLOGY

## 2019-11-20 RX ORDER — IBUPROFEN 800 MG/1
800 TABLET, FILM COATED ORAL EVERY 6 HOURS PRN
Status: DISCONTINUED | OUTPATIENT
Start: 2019-11-20 | End: 2019-11-22 | Stop reason: HOSPADM

## 2019-11-20 RX ADMIN — IBUPROFEN 800 MG: 800 TABLET, FILM COATED ORAL at 22:42

## 2019-11-20 RX ADMIN — IBUPROFEN 800 MG: 800 TABLET, FILM COATED ORAL at 05:01

## 2019-11-20 RX ADMIN — LEVOTHYROXINE SODIUM 50 MCG: 50 TABLET ORAL at 07:48

## 2019-11-20 RX ADMIN — BUPROPION HYDROCHLORIDE 150 MG: 150 TABLET ORAL at 07:57

## 2019-11-20 RX ADMIN — SENNOSIDES AND DOCUSATE SODIUM 1 TABLET: 8.6; 5 TABLET ORAL at 07:48

## 2019-11-20 RX ADMIN — DIPHENHYDRAMINE HYDROCHLORIDE 25 MG: 25 CAPSULE ORAL at 22:41

## 2019-11-20 RX ADMIN — LAMOTRIGINE 100 MG: 100 TABLET ORAL at 22:42

## 2019-11-20 RX ADMIN — ACETAMINOPHEN 975 MG: 325 TABLET, FILM COATED ORAL at 16:55

## 2019-11-20 RX ADMIN — ACETAMINOPHEN 975 MG: 325 TABLET, FILM COATED ORAL at 07:48

## 2019-11-20 RX ADMIN — IBUPROFEN 800 MG: 800 TABLET, FILM COATED ORAL at 16:55

## 2019-11-20 RX ADMIN — SENNOSIDES AND DOCUSATE SODIUM 2 TABLET: 8.6; 5 TABLET ORAL at 22:41

## 2019-11-20 RX ADMIN — IBUPROFEN 800 MG: 800 TABLET, FILM COATED ORAL at 11:05

## 2019-11-20 RX ADMIN — SALINE NASAL SPRAY 1 SPRAY: 1.5 SOLUTION NASAL at 23:13

## 2019-11-20 NOTE — PLAN OF CARE
Data: Vital signs within normal limits. Postpartum checks within normal limits - see flow record. Patient eating and drinking normally. Patient able to empty bladder independently and is up ambulating. No apparent signs of infection. Incision healing well. Patient performing self cares and is able to care for infant.  Action: Patient medicated during the shift for pain and cramping. See MAR. Patient reassessed within 1 hour after each medication and pain was improved - patient stated she was comfortable. Patient education done about incisional care, postpartum cares, showering. See flow record.  Response: Positive attachment behaviors observed with infant. Support persons  Brett present.   Plan: Anticipate discharge on POD 2-3.

## 2019-11-20 NOTE — PLAN OF CARE
BPs mildly elevated, hx gestation htn. Postpartum checks within normal limits - see flow record. Patient eating and drinking normally. Patient able to empty bladder independently and is up ambulating. No apparent signs of infection. Incision healing well. Patient performing self cares and is visiting infant in NICU.  Patient is using breast pump. Patient medicated during the shift for pain and cramping. See MAR. Anticipate discharge on POD 2-3.

## 2019-11-20 NOTE — LACTATION NOTE
"D:  I met with Mechelle and Brett; Suzanne is their 1st baby.  Per chart has hx hypothyroid, asthma, depression and anxiety., takes wellbutrin, lamictal and levothyroxine, and has no history of breast/chest surgery or trauma.  She has already started to pump and is getting sizeable puddles on Maintain setting.  She already has a hands-free pumping bra.  She was kangaroo'ing while we talked.    I:  I gave her a folder of introductory materials and went over pumping guidelines.  I reviewed physiology of colostrum and milk production, pumping guidelines, and I gave her a log and encouraged her to use it.   I explained how to access the videos \"Hand Expression\" and \"Maximizing Milk Production\"; as well as other helpful books and websites.   We discussed hands-on pumping techniques and usefulness of a hands-free pumping bra.  We discussed skin to skin holding and how to reach your breastfeeding goals.  We talked about medications during breastfeeding.  She verbalized understanding via teachback.  She has a [employer]ra through insurance.  Suzanne was sleeping soundly upright on mom's chest, no feeding cues.  They asked if they should try to latch her (since it was a feeding time); I explained \"infant driven feeds\", what feeding cues look like (and what they don't look like), benefit of latching per cues and kangaroo'ing when not cueing.  They wanted to try, so I assisted in latching.  We discussed supportive hold, positioning, latch, breastfeeding patterns and infant driven feeding, breast support and compressions, skin to skin benefits, and timing of pumpings around breastfeedings.  We got her into a cross cradle hold and Mom was able to hand express some sizeable gtts which Suzanne licked up, then she fell asleep and was given to dad so mom could pump at bedside.  A:  Mom has information she needs to initiate her supply.   P:  Will continue to provide lactation support.  Lilly Yo, RNC, IBCLC     "

## 2019-11-20 NOTE — PROGRESS NOTES
Interval Progress Note     Patient requested MD to come to room to discuss  section.      Patient reports that her pain has been well controlled with toradol and her TAP block.  She states that she has voided urine without issues, is ambulating without dizziness, and tolerating regular diet without nausea or vomiting.  She is pumping breast milk for baby in the NICU.  She reports baby is improving.  She plans to go back down to the NICU now and spend time with baby.      We discussed pain control post-operatively, expectations for recovery, hospital stay length, and activity restrictions post-op.  We reviewed daily rounding schedule with the OB team.     Yesika Baker MD

## 2019-11-20 NOTE — PROGRESS NOTES
[facility], [place]    Obstetrics Post-Op / Progress Note    Assessment & Plan   Assessment:  -1 Day Post-Op  Procedure(s):   SECTION    Doing well.  No excessive bleeding  Pain well-controlled.    Plan:  Ambulation encouraged  Anticipate discharge in 1-2 days    CHAVEZ AZUCENA     Interval History   Doing well.  Pain is well-controlled.  No fevers.  No history of wound drainage, warmth or significant erythema.  Good appetite.  Denies chest pain, shortness of breath, nausea or vomiting.  Ambulatory.  Pumping for baby in NICU.    Medications     dextrose 5% lactated ringers Stopped (19 1118)     NO Rho (D) immune globulin (RhoGam) needed - mother Rh POSITIVE       - MEDICATION INSTRUCTIONS -       oxytocin in 0.9% NaCl 100 mL/hr (19 0546)     oxytocin in 0.9% NaCl         acetaminophen  975 mg Oral Q8H     buPROPion  150 mg Oral Daily     lamoTRIgine  100 mg Oral At Bedtime     levothyroxine  50 mcg Oral Daily     Measles, Mumps & Rubella Vac  0.5 mL Subcutaneous Once     omeprazole  20 mg Oral Daily     sodium chloride (PF)  3 mL Intracatheter Q8H       Physical Exam   Temp: 98.2  F (36.8  C) Temp src: Oral BP: (!) 136/92 Pulse: 72 Heart Rate: 72 Resp: 18 SpO2: 96 %      Vitals:    19 0830   Weight: 88.9 kg (196 lb)     Vital Signs with Ranges  Temp:  [97.7  F (36.5  C)-98.3  F (36.8  C)] 98.2  F (36.8  C)  Pulse:  [] 72  Heart Rate:  [55-72] 72  Resp:  [16-20] 18  BP: (122-140)/(86-95) 136/92  SpO2:  [96 %] 96 %  I/O last 3 completed shifts:  In: -   Out: 1500 [Urine:1500]    Uterine fundus is firm, non-tender and at the level of the umbilicus  Incision C/D/I    Data   Recent Labs   Lab Test 19  111   ABO A   RH Pos   AS Neg     Recent Labs   Lab Test 19  0827 19  1117   HGB 12.1 13.7     Recent Labs   Lab Test 19  1541   RUQIGG 7

## 2019-11-21 PROCEDURE — 25000132 ZZH RX MED GY IP 250 OP 250 PS 637: Performed by: OBSTETRICS & GYNECOLOGY

## 2019-11-21 PROCEDURE — 12000001 ZZH R&B MED SURG/OB UMMC

## 2019-11-21 PROCEDURE — 25000132 ZZH RX MED GY IP 250 OP 250 PS 637: Performed by: STUDENT IN AN ORGANIZED HEALTH CARE EDUCATION/TRAINING PROGRAM

## 2019-11-21 RX ADMIN — ACETAMINOPHEN 975 MG: 325 TABLET, FILM COATED ORAL at 19:56

## 2019-11-21 RX ADMIN — IBUPROFEN 800 MG: 800 TABLET, FILM COATED ORAL at 11:50

## 2019-11-21 RX ADMIN — IBUPROFEN 800 MG: 800 TABLET, FILM COATED ORAL at 19:56

## 2019-11-21 RX ADMIN — SALINE NASAL SPRAY 1 SPRAY: 1.5 SOLUTION NASAL at 08:12

## 2019-11-21 RX ADMIN — IBUPROFEN 800 MG: 800 TABLET, FILM COATED ORAL at 05:51

## 2019-11-21 RX ADMIN — LEVOTHYROXINE SODIUM 50 MCG: 50 TABLET ORAL at 07:01

## 2019-11-21 RX ADMIN — BUPROPION HYDROCHLORIDE 150 MG: 150 TABLET ORAL at 08:00

## 2019-11-21 RX ADMIN — ACETAMINOPHEN 975 MG: 325 TABLET, FILM COATED ORAL at 02:52

## 2019-11-21 RX ADMIN — LAMOTRIGINE 100 MG: 100 TABLET ORAL at 22:22

## 2019-11-21 RX ADMIN — ACETAMINOPHEN 975 MG: 325 TABLET, FILM COATED ORAL at 11:50

## 2019-11-21 NOTE — PROGRESS NOTES
[facility] Obstetrics Post-Op  Section Progress Note     Postop day # 2    SUBJECTIVE:   No complaints.    Baby is in NICU but might get discharged from NICU today.   Bleeding is appropriate.  Working on breast feeding baby, but she is not latching regularly yet, so pumping too.   Ambulating fine.          Physical Exam:     Vitals:    19 0851 19 1800 19 2325 19 0800   BP: (!) 136/92 (!) 149/94 137/82 136/86   Pulse: 72 69  64   Resp: 18 16 17 18   Temp: 98.2  F (36.8  C) 98.4  F (36.9  C) 98.4  F (36.9  C) 98.3  F (36.8  C)   TempSrc: Oral Oral Oral Oral   SpO2:       Weight:       Height:          Gen:  NAD,   normal respiratory and cardiovascular effort   ABD:  Soft, NT, ND   Incision:  C/d/I  Uterine fundus is firm, non-tender and U-2   bilateral LE's: 1+ edema, nontender    Hemoglobin   Date Value Ref Range Status   2019 12.1 11.7 - 15.7 g/dL Final   2019 13.7 11.7 - 15.7 g/dL Final              Assessment and Plan:    Doing well POD # 2  Routine postop cares   Contraception: not discussed  Plan D/C likely tomorrow      Elsa Berman MD

## 2019-11-21 NOTE — LACTATION NOTE
This note was copied from a baby's chart.  D:  I met with family x2 this shift.  I:  Reinforced supportive positioning, pumping regime, etc.  We discussed use of nipple shield and they gave it a try; improved latch and suck.  They are hopeful Suzanne will return to postpartum; reassured that they can assist with finger feeding/ pumping/ DBM there as well.  P:  Will continue to provide lactation support.    Lilly Yo, RNC, IBCLC

## 2019-11-21 NOTE — PROVIDER NOTIFICATION
11/20/19 1935   Provider Notification   Provider Name/Title G2 Dr. Mix   Method of Notification Electronic Page   Request Evaluate-Remote   Notification Reason Medication Request   Pt want Nasal Spray for stuffy nose. Thanks

## 2019-11-21 NOTE — PLAN OF CARE
Data: Vital signs and postpartum checks WDL  Patient eating and drinking normally. Patient able to empty bladder independently and is up ambulating.  Patient performing self cares and is able to care for infant in NICU. Breastfeeding in NICU per pt and also pumping regularly.  Action: Patient medicated during the shift for pain with Tylenol and Ibuprofen with relief after 1 hour. Patient education done see education record.  Response: Positive attachment behaviors observed with infant. Support persons  present.    Plan: Continue with the plan of care

## 2019-11-21 NOTE — PROGRESS NOTES
"Post  Anesthesia Follow Up Note    Patient: Mechelle Mata    Patient location: Postpartum floor.    Chief complaint: Acute postoperative pain managment    Procedure(s) Performed:  No admission procedures for hospital encounter.    Anesthesia type: Spinal Block and transversus abdominus plane (TAP) nerve block        Subjective:   The patient reports good pain control.  Pain Intensity: 4/10.  Patient reports mild pruritus, denies weakness, paresthesia.  Denies numbness, tingling lips, tinnitus, metallic taste, difficulties breathing nausea, vomiting, or difficulty voiding. She is able to ambulate and tolerates regular diet.        Objective:  Respiratory Function (RR / SpO2 / Airway Patency): Satisfactory    Cardiac Function (HR / Rhythm / BP): Satisfactory    Strength and sensation lower extremities: Strength 5/5 and grossly symmetric bilateral LE    Site of spinal/epidural insertion: clean and intact, no tenderness, swelling or erythema    Last Vitals: /82   Pulse 69   Temp 36.9  C (98.4  F) (Oral)   Resp 17   Ht 1.727 m (5' 8\")   Wt 88.9 kg (196 lb)   LMP 2019 (Exact Date)   SpO2 96%   Breastfeeding Yes   BMI 29.80 kg/m        Assessment and Plan:   Mechelle Mata is a 29 year old female POD #1 s/p caesarian section. with spinal morphine, 0.1 mg IT and single shot TAP nerve block injections bupivacaine 0.25% with epinephrine 1:200,000 20 mL, then liposome bupivacaine (Exparel) long-acting 1.3% 20 mL given on 2019 for postoperative analgesia.  Pt is ambulating without difficulty, no weakness or paresthesias.  No evidence of adverse side effects associated with spinal and nerve block injections. Pt is receiving adequate incisional pain control.  Anticipate up to 72 hours of incisional pain control.  Anticipate patient will require opioid/nonopioid analgesics for visceral and muscle pain not controlled with local anesthetic.        - NO other local anesthetic use within 96 hours of " liposome bupivacaine (Exparel) long acting  - patient received verbal and written instructions about liposome bupivacaine   - please call if questions or concerns  - discussed plan with attending anesthesiologist    Shahram Geiger MD  Anesthesiology Resident, CA-1  11/21/2019 6:06 AM    If questions or concerns, please contact OB Anesthesiologist  Phone [phone]

## 2019-11-21 NOTE — PLAN OF CARE
Data:  BPs 130-140s/90s. Postpartum checks within normal limits - see flow record. Patient eating and drinking normally. Patient able to empty bladder independently and is up ambulating. No apparent signs of infection. Incision healing well, ISAAK with steris. Patient performing self cares and is visiting infant in NICU - coming up to floor around 1700.  Action: Patient medicated during the shift for pain and cramping. See MAR. Patient reassessed within 1 hour after each medication and pain was improved - patient stated she was comfortable. Patient education done about postpartum cares, NICU transfer process, pumping. See flow record.  Response: Positive attachment behaviors observed with infant. Support persons  Brett present.   Plan: Anticipate discharge on tomorrow.

## 2019-11-22 ENCOUNTER — LACTATION ENCOUNTER (OUTPATIENT)
Age: 29
End: 2019-11-22

## 2019-11-22 VITALS
RESPIRATION RATE: 18 BRPM | HEART RATE: 63 BPM | DIASTOLIC BLOOD PRESSURE: 90 MMHG | HEIGHT: 68 IN | BODY MASS INDEX: 29.7 KG/M2 | WEIGHT: 196 LBS | OXYGEN SATURATION: 96 % | TEMPERATURE: 97.7 F | SYSTOLIC BLOOD PRESSURE: 136 MMHG

## 2019-11-22 PROCEDURE — 25000128 H RX IP 250 OP 636: Performed by: STUDENT IN AN ORGANIZED HEALTH CARE EDUCATION/TRAINING PROGRAM

## 2019-11-22 PROCEDURE — 25000132 ZZH RX MED GY IP 250 OP 250 PS 637: Performed by: OBSTETRICS & GYNECOLOGY

## 2019-11-22 PROCEDURE — 25000132 ZZH RX MED GY IP 250 OP 250 PS 637: Performed by: STUDENT IN AN ORGANIZED HEALTH CARE EDUCATION/TRAINING PROGRAM

## 2019-11-22 PROCEDURE — 90707 MMR VACCINE SC: CPT | Performed by: STUDENT IN AN ORGANIZED HEALTH CARE EDUCATION/TRAINING PROGRAM

## 2019-11-22 RX ORDER — IBUPROFEN 800 MG/1
800 TABLET, FILM COATED ORAL EVERY 6 HOURS PRN
Qty: 60 TABLET | Refills: 0 | Status: SHIPPED | OUTPATIENT
Start: 2019-11-22 | End: 2020-01-15

## 2019-11-22 RX ORDER — AMOXICILLIN 250 MG
1-2 CAPSULE ORAL 2 TIMES DAILY PRN
Qty: 60 TABLET | Refills: 0 | Status: SHIPPED | OUTPATIENT
Start: 2019-11-22 | End: 2019-12-02

## 2019-11-22 RX ORDER — OXYCODONE HYDROCHLORIDE 5 MG/1
5-10 TABLET ORAL
Qty: 8 TABLET | Refills: 0 | Status: SHIPPED | OUTPATIENT
Start: 2019-11-22 | End: 2019-12-02

## 2019-11-22 RX ORDER — ACETAMINOPHEN 325 MG/1
975 TABLET ORAL EVERY 8 HOURS
Qty: 1 BOTTLE | Refills: 0 | Status: SHIPPED | OUTPATIENT
Start: 2019-11-22 | End: 2020-01-15

## 2019-11-22 RX ADMIN — BUPROPION HYDROCHLORIDE 150 MG: 150 TABLET ORAL at 07:50

## 2019-11-22 RX ADMIN — ACETAMINOPHEN 975 MG: 325 TABLET, FILM COATED ORAL at 03:28

## 2019-11-22 RX ADMIN — IBUPROFEN 800 MG: 800 TABLET, FILM COATED ORAL at 09:40

## 2019-11-22 RX ADMIN — MEASLES, MUMPS, AND RUBELLA VIRUS VACCINE LIVE 0.5 ML: 1000; 12500; 1000 INJECTION, POWDER, LYOPHILIZED, FOR SUSPENSION SUBCUTANEOUS at 09:40

## 2019-11-22 RX ADMIN — OXYCODONE HYDROCHLORIDE 5 MG: 5 TABLET ORAL at 07:08

## 2019-11-22 RX ADMIN — ACETAMINOPHEN 650 MG: 325 TABLET, FILM COATED ORAL at 12:30

## 2019-11-22 RX ADMIN — IBUPROFEN 800 MG: 800 TABLET, FILM COATED ORAL at 03:28

## 2019-11-22 RX ADMIN — LEVOTHYROXINE SODIUM 50 MCG: 50 TABLET ORAL at 07:08

## 2019-11-22 NOTE — PLAN OF CARE
Data: Vital signs and postpartum checks WDL  Patient eating and drinking normally. Patient able to empty bladder independently and is up ambulating.  Patient performing self cares and is able to care for infant. Breastfeeding on demand. Supplementing baby with pumped breast milk.   Action: Patient medicated during the shift for pain with Tylenol and Ibuprofen with relief after 1 hour. Patient education done see education record.  Response: Positive attachment behaviors observed with infant. Support persons  present.    Plan: Continue with the plan of care

## 2019-11-22 NOTE — PLAN OF CARE
Discharge education done with pt and spouse and pt stated an understanding of teaching. Plan for pt to make BP check next week as well as 6 week post partum check. Pt discharged [facility] at 1400.

## 2019-11-22 NOTE — PROGRESS NOTES
"S; Feeling good, no c/o.  Pain well controlled with po meds.  Tolerating a general diet.  Passing flatus.  Ambulating without difficulty.  Breastfeeding.  Urinating OK.    O: BP (!) 135/90   Pulse 63   Temp 97.7  F (36.5  C)   Resp 18   Ht 1.727 m (5' 8\")   Wt 88.9 kg (196 lb)   LMP 02/08/2019 (Exact Date)   SpO2 96%   Breastfeeding Yes   BMI 29.80 kg/m       Abd: SNT, mildly distended.  Appropriately TTP  Wound: incision C/D/I  Ex: no calf tenderness    Hgb: 12.1    A/P;  1) POD #3 S/P LTCS   Doing well, ok for d/c.  Instructions given.  Rx for ibuprofen, tylenol, oxycodone and senna.  RTC 1-2 weeks for BP check.    VINCENZO LOYOLA MD    "

## 2019-11-22 NOTE — LACTATION NOTE
This note was copied from a baby's chart.  Consult for: First time breastfeeding, using nipple shield, help with latch.     History:  C/S delivery @ 37w3d, AGA infant (12th percentile) @ 6# birthweight, 3% loss at 24 hours, -5.4% from birthweight @ 79 hours with low risk serum bilirubin all three times checked.  Maternal history of gestational HTN, asthma, depression and thyroid disease. Milk is in, mom getting large amounts pumping (50 mL).     Breast exam of mom: Full and rounded, symmetrical with intact, everted nipples bilaterally.     Oral exam of baby: Good tongue extension and lift spontaneously. Bunches tongue at first when sucking on finger but organized after several seconds, then extends well beyond gum ridge while sucking.     Feeding assessment:  Baby latched on tip of nipple shield on arrival, mom reports slight pinching and baby sleepy (per mom she's sleepy most of the time), cheeks backed away from breast and tip of shield visible outside mouth. Attempt to demo deeper latch with shield, baby reluctant to open and holding tongue up in mouth when she does. After diaper change, baby woke. Demo laid back positioning for mom, infant latched right away and sustained feeding for >20 minutes still going at end of visit with nutritive sucking and frequent swallows.    Education provided: Discussed positioning & using pillows/blankets for support, anatomy of breast and infant mouth for feedings, importance of and ways to get and maintain deeper latch with and without shield, using breast compressions to enhance milk transfer, point out nutritive vs. non-nutritive suck and how to hear swallows, benefits of skin to skin and feeding on cue, supply and demand, benefits of hands on pumping to help establish supply for early term baby, how to tell when satiated and if getting enough, what to expect in the coming days and preventing engorgement. Reviewed breastfeeding log with when and who to call if concerns and  breastfeeding resource list adding in [email or web address] and additional community resources. They plan follow up at [facility], lactation follow [facility] at [facility].     Plan:   Frequent skin to skin, breastfeed on cue with goal of 8 to 12 feedings in 24 hours, watch for early cues. No longer than 3 hours between feedings, encourage breast compressions to enhance milk transfer & recommend supplement after according to guidelines for baby under 6 pounds. Parents report MD advised they didn't need to supplement now that mom's milk is in. Being early term and <6#, encouraged may not be needed but supportive to give supplements especially at feedings when baby more sleepy & doesn't feed as well, continue to track feedings and output on log and call MD if not meeting minimum goals or any concerns. Encourage continued hands on pumping 4 times daily for first week until lactation appointment, offer baby results. Follow up with outpatient lactation consultant within a week of discharge for support with early term infant, check in on milk transfer, infant weights and guidance on weaning from pumping (and nipple shield if used) after supply established.    Supplement Guidelines for infants <37 weeks gestation or < 6 lbs at birth per the [facility] Feeding Methods for the  Infant (35-42 weeks) Policy ([facility] Guidelines):  Infants <37 weeks OR <6 pounds   Birth-24 hours of age: 5ml (1 tsp) every 2 - 3 hours, at least 8 times in 24 hours   24-48 hours of age: 10 ml (2 tsp) every 2 - 3 hours, at least 8 times in 24 hours   48-72 hours of age: 15 ml (3 tsp) every 2 - 3 hours, at least 8 times in 24 hours

## 2019-11-22 NOTE — PLAN OF CARE
BP's continue to be mildly elevated. Postpartum assessment WNL. Pt denies HA, RUQ pain, or visual disturbances. Incision open to air, clean, dry, intact. Pain controlled with PO pain medication. Breastfeeding infant on cue, encouraged pt to call prior to next feed to observe latch. Pt also pumping and finger feeding infant. Plan to discharge today and follow up next week for BP check.

## 2019-12-02 ENCOUNTER — PRENATAL OFFICE VISIT (OUTPATIENT)
Dept: OBGYN | Facility: CLINIC | Age: 29
End: 2019-12-02
Payer: COMMERCIAL

## 2019-12-02 VITALS
HEART RATE: 86 BPM | SYSTOLIC BLOOD PRESSURE: 133 MMHG | TEMPERATURE: 98.1 F | BODY MASS INDEX: 26.35 KG/M2 | WEIGHT: 173.3 LBS | DIASTOLIC BLOOD PRESSURE: 82 MMHG

## 2019-12-02 DIAGNOSIS — O13.9 GESTATIONAL HYPERTENSION, ANTEPARTUM: Primary | ICD-10-CM

## 2019-12-02 PROCEDURE — 99212 OFFICE O/P EST SF 10 MIN: CPT | Mod: 24 | Performed by: OBSTETRICS & GYNECOLOGY

## 2019-12-02 ASSESSMENT — PATIENT HEALTH QUESTIONNAIRE - PHQ9
5. POOR APPETITE OR OVEREATING: NOT AT ALL
SUM OF ALL RESPONSES TO PHQ QUESTIONS 1-9: 0

## 2019-12-02 ASSESSMENT — ANXIETY QUESTIONNAIRES
3. WORRYING TOO MUCH ABOUT DIFFERENT THINGS: NOT AT ALL
6. BECOMING EASILY ANNOYED OR IRRITABLE: NOT AT ALL
5. BEING SO RESTLESS THAT IT IS HARD TO SIT STILL: NOT AT ALL
2. NOT BEING ABLE TO STOP OR CONTROL WORRYING: NOT AT ALL
GAD7 TOTAL SCORE: 0
7. FEELING AFRAID AS IF SOMETHING AWFUL MIGHT HAPPEN: NOT AT ALL
1. FEELING NERVOUS, ANXIOUS, OR ON EDGE: NOT AT ALL
IF YOU CHECKED OFF ANY PROBLEMS ON THIS QUESTIONNAIRE, HOW DIFFICULT HAVE THESE PROBLEMS MADE IT FOR YOU TO DO YOUR WORK, TAKE CARE OF THINGS AT HOME, OR GET ALONG WITH OTHER PEOPLE: NOT DIFFICULT AT ALL

## 2019-12-02 NOTE — NURSING NOTE
"Chief Complaint   Patient presents with     RECHECK     bp check       Initial /82   Pulse 86   Temp 98.1  F (36.7  C) (Oral)   Wt 78.6 kg (173 lb 4.8 oz)   LMP 2019 (Exact Date)   Breastfeeding Yes   BMI 26.35 kg/m   Estimated body mass index is 26.35 kg/m  as calculated from the following:    Height as of 19: 1.727 m (5' 8\").    Weight as of this encounter: 78.6 kg (173 lb 4.8 oz).  BP completed using cuff size: regular    Questioned patient about current smoking habits.  Pt. has never smoked.          The following HM Due: NONE      The following patient reported/Care Every where data was sent to:  P ABSTRACT QUALITY INITIATIVES [[id number]]        patient has appointment for today  Alessia Daniels                "

## 2019-12-02 NOTE — PROGRESS NOTES
CC: blood pressure check, gestational hypertension-delivered      Mechelle Mata is a 29 year old  who had  on 19 after failed induction, category 2 fetal heart tones.  He has been about 2-1/2 days in the NICU due to respiratory and blood glucose issues, but ended up going home with mom.  Has been doing well since then.  Has already reached her birthweight.    Mechelle reports being tired, as baby does not want to sleep anywhere but her arms, but is otherwise doing well.  Habits actually had an over supply in terms of breast-feeding and has been using a nipple shield.    Has some soreness above her incision, but no skin separation or drainage from the wound.    Denies headache, vision changes, chest pain, shortness of breath, and right upper quadrant pain.    /82   Pulse 86   Temp 98.1  F (36.7  C) (Oral)   Wt 78.6 kg (173 lb 4.8 oz)   LMP 2019 (Exact Date)   Breastfeeding Yes   BMI 26.35 kg/m    Gen:  NAD  Abd: Incision healing well.  Open to air.  No erythema, induration, or other signs of infection  Ext:  NT, trace edema    Mechelle Mata is a 29 year old  approximately 2w post-op from primary , here for BP check due to gestational hypertension.    BP normal today.  Discussed s/sx of pre-eclampsia and instructed her to call if she develops any of these.  Discussed postpartum contraception.  She plans birth control pills, which she is taken before.  Discussed that she will be prescribed a minipill while breastfeeding.  Plan to abstain until 6w visit and then start POP at that point.    RTC for post-partum visit and prn.    Zuri Dumont MD

## 2019-12-03 ASSESSMENT — ANXIETY QUESTIONNAIRES: GAD7 TOTAL SCORE: 0

## 2019-12-19 ENCOUNTER — MEDICAL CORRESPONDENCE (OUTPATIENT)
Dept: HEALTH INFORMATION MANAGEMENT | Facility: CLINIC | Age: 29
End: 2019-12-19

## 2019-12-30 ENCOUNTER — PRENATAL OFFICE VISIT (OUTPATIENT)
Dept: OBGYN | Facility: CLINIC | Age: 29
End: 2019-12-30
Payer: COMMERCIAL

## 2019-12-30 VITALS
TEMPERATURE: 98.1 F | WEIGHT: 172 LBS | HEART RATE: 85 BPM | DIASTOLIC BLOOD PRESSURE: 78 MMHG | SYSTOLIC BLOOD PRESSURE: 124 MMHG | BODY MASS INDEX: 26.15 KG/M2

## 2019-12-30 DIAGNOSIS — N94.10 DYSPAREUNIA IN FEMALE: ICD-10-CM

## 2019-12-30 DIAGNOSIS — Z87.59 HISTORY OF GESTATIONAL HYPERTENSION: ICD-10-CM

## 2019-12-30 DIAGNOSIS — Z98.891 S/P CESAREAN SECTION: ICD-10-CM

## 2019-12-30 DIAGNOSIS — O92.70 LACTATION PROBLEM: ICD-10-CM

## 2019-12-30 PROCEDURE — 99207 ZZC POST PARTUM EXAM: CPT | Performed by: OBSTETRICS & GYNECOLOGY

## 2019-12-30 RX ORDER — ACETAMINOPHEN AND CODEINE PHOSPHATE 120; 12 MG/5ML; MG/5ML
0.35 SOLUTION ORAL DAILY
Qty: 84 TABLET | Refills: 3 | Status: SHIPPED | OUTPATIENT
Start: 2019-12-30 | End: 2020-12-01

## 2019-12-30 ASSESSMENT — ANXIETY QUESTIONNAIRES
7. FEELING AFRAID AS IF SOMETHING AWFUL MIGHT HAPPEN: NOT AT ALL
6. BECOMING EASILY ANNOYED OR IRRITABLE: NOT AT ALL
1. FEELING NERVOUS, ANXIOUS, OR ON EDGE: NOT AT ALL
GAD7 TOTAL SCORE: 0
IF YOU CHECKED OFF ANY PROBLEMS ON THIS QUESTIONNAIRE, HOW DIFFICULT HAVE THESE PROBLEMS MADE IT FOR YOU TO DO YOUR WORK, TAKE CARE OF THINGS AT HOME, OR GET ALONG WITH OTHER PEOPLE: NOT DIFFICULT AT ALL
2. NOT BEING ABLE TO STOP OR CONTROL WORRYING: NOT AT ALL
5. BEING SO RESTLESS THAT IT IS HARD TO SIT STILL: NOT AT ALL
3. WORRYING TOO MUCH ABOUT DIFFERENT THINGS: NOT AT ALL

## 2019-12-30 ASSESSMENT — PATIENT HEALTH QUESTIONNAIRE - PHQ9
5. POOR APPETITE OR OVEREATING: NOT AT ALL
SUM OF ALL RESPONSES TO PHQ QUESTIONS 1-9: 2

## 2019-12-30 NOTE — NURSING NOTE
"Chief Complaint   Patient presents with     Post Partum Exam       Initial /78 (BP Location: Left arm, Patient Position: Sitting, Cuff Size: Adult Regular)   Pulse 85   Temp 98.1  F (36.7  C) (Oral)   Wt 78 kg (172 lb)   BMI 26.15 kg/m   Estimated body mass index is 26.15 kg/m  as calculated from the following:    Height as of 19: 1.727 m (5' 8\").    Weight as of this encounter: 78 kg (172 lb).  BP completed using cuff size: regular    Questioned patient about current smoking habits.  Pt. has never smoked.          The following HM Due: NONE      The following patient reported/Care Every where data was sent to:  P ABSTRACT QUALITY INITIATIVES [[id number]]  KIRILL Chase MA           "

## 2019-12-31 ASSESSMENT — ANXIETY QUESTIONNAIRES: GAD7 TOTAL SCORE: 0

## 2020-01-02 NOTE — PROGRESS NOTES
OB GYN CLINIC VISIT  2020    CC: postpartum visit    SUBJECTIVE:  Mechelle Mata is a 29 year old female  here for a postpartum visit.  She had a  Section  on 19 delivering a healthy baby girl weighing 6 lbs 0 oz at 37w3d.    Had IOL for gestational hypertension and  for category 2 FHT remote from delivery.    PHQ-9 SCORE 2019   PHQ-9 Total Score 2 0 2     LUCIO-7 SCORE 2019   Total Score 0 0         delivery complications:  Yes, see above  breast feeding:  Yes, concerned about over supply  bladder problems:  No  bowel problems/hemorrhoids:  No  episiotomy/laceration/incision healed? Yes: no concerns  vaginal flow:  None  [place]:  Yes, tried briefly and had discomfort/burning  contraception:  Desires POP  emotional adjustment:  doing well and happy      Current Outpatient Medications   Medication     acetaminophen (TYLENOL) 325 MG tablet     albuterol (PROVENTIL HFA) 108 (90 Base) MCG/ACT inhaler     buPROPion (WELLBUTRIN XL) 150 MG 24 hr tablet     CHOLINE BITARTRATE PO     clindamycin (CLINDAMAX) 1 % external gel     ibuprofen (ADVIL/MOTRIN) 800 MG tablet     lamoTRIgine (LAMICTAL) 100 MG tablet     norethindrone (MICRONOR) 0.35 MG tablet     Prenatal Vit-Fe Fumarate-FA (PRENATAL PO)     Vitamin D, Cholecalciferol, 1000 units CAPS     levothyroxine (SYNTHROID/LEVOTHROID) 50 MCG tablet     No current facility-administered medications for this visit.        OBJECTIVE:  Blood pressure 124/78, pulse 85, temperature 98.1  F (36.7  C), temperature source Oral, weight 78 kg (172 lb), currently breastfeeding.   General - pleasant female in no acute distress.  Breast - no nodularity, asymmetry or nipple discharge bilaterally.  Abdomen - soft, nontender, nondistended, no hepatosplenomegaly.  Pelvic - EG: normal adult female, BUS: within normal limits, Vagina: well rugated, no discharge, Cervix: normal, no lesions or CMT, Uterus: firm, normal sized and  nontender, Adnexae: no masses or tenderness.  Rectovaginal - deferred.    ASSESSMENT:  29 year old  with normal postpartum exam    PLAN:  Discussed kegel exercises   May resume normal activities without restrictions  Pap smear was not done today    The patient will use POP for birth control. Full counseling was provided, and all questions answered. Compliance is strongly emphasized.    1. Encounter for postpartum visit  Healing well.  Normal post-partum recovery    2. S/P  section  Discussed recommended inter-pregnancy interval  - norethindrone (MICRONOR) 0.35 MG tablet; Take 1 tablet (0.35 mg) by mouth daily  Dispense: 84 tablet; Refill: 3    3. History of gestational hypertension  BP normal today.  Would be candidate for ASA with future pregnancies    4. Lactation problem  Concerned about over-supply.  Discussed limiting pumping sessions, as that would encourage breasts to continue producing more mild.  Will only hand express prn to relief pain.    5. Dyspareunia in female  Offered PFPT, but she wants to hold off for now.  Will ensure she's using lubrication, as needed, a try a few more times before considering PFPT    Return to clinic in one year for an annual, when due for a pap smear or PRN.    Zuri Dumont MD

## 2020-01-14 NOTE — PROGRESS NOTES
Subjective     Mechelle Mata is a 29 year old female who presents to clinic today for the following health issues:    Patient is establishing care.  She was previously seen at [facility] physicians.    HPI   Hypothyroidism Follow-up      Since last visit, patient describes the following symptoms: Weight stable, no hair loss, no skin changes, no constipation, no loose stools    Component      Latest Ref Rng & Units 6/28/2019 8/20/2019 11/8/2019   TSH      0.40 - 4.00 mU/L 2.28 1.66 3.10   T4 Free      0.76 - 1.46 ng/dL 0.91         Mental Health Follow-Up and Med Refill    How are you doing with your depression since your last visit? Improved    [facility] - previous psychiatry    How are you doing with your anxiety since your last visit?  Improved     Are you having other symptoms that might be associated with depression or anxiety? No    Have you had a significant life event? OTHER: given birth      Do you have any concerns with your use of alcohol or other drugs? No    Previous med trials  Prozac (fluoxetine) YES- as withdraw aid  Zoloft (sertraline) no  Celexa (citalopram) YES- not full response  Lexapro (escitalopram) YES- not full response  Paxil (paroxetine) no  Wellbutrin (bupropion) YES- current, needs to be brand name  Effexor (venlafaxine) YES- not full response  Others:  no  Cymbalta  Lamictal - current, desires weaning, questions about recall of medication    Social History     Tobacco Use     Smoking status: Never Smoker     Smokeless tobacco: Never Used   Substance Use Topics     Alcohol use: Not Currently     Drug use: Never     PHQ 5/8/2019 12/2/2019 12/30/2019   PHQ-9 Total Score 2 0 2   Q9: Thoughts of better off dead/self-harm past 2 weeks Not at all Not at all Not at all     LUCIO-7 SCORE 12/2/2019 12/30/2019   Total Score 0 0     Suicide Assessment Five-step Evaluation and Treatment (SAFE-T)      How many servings of fruits and vegetables do you eat daily?  2-3    On average, how  "many sweetened beverages do you drink each day (Examples: soda, juice, sweet tea, etc.  Do NOT count diet or artificially sweetened beverages)?   0    How many days per week do you exercise enough to make your heart beat faster? 6    How many minutes a day do you exercise enough to make your heart beat faster? 20 - 29    How many days per week do you miss taking your medication? 0      Reviewed and updated as needed this visit by Provider    Patient presents to clinic to establish care and discuss mental health management. She has been followed by psychiatry for depression and is taking lamotrigine and wellbutrin. Her psychiatrist recently left and she is looking for a new provider to prescribe her medications. She is 10-weeks post-partum (baby girl, Violet). She is taking lamotrigine 100mg once daily and is interested in weaning off the medication, as she is currently breastfeeding and she feels her depression is under good control. When she found out she was pregnant she weaned from 150mg to 100mg. She remember this dose decrease was fairly difficult, so she would like a slower wean. 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 She is also taking Wellbutrin 150mg once daily, which she feels is helpful. She does not want to stop or wean Wellbutrin at this time.    She started the \"mini-pill\" last weekand she has been intermittently bleeding since; sometimes as heavy as an actual period. She has not had intercourse post-partum as it has been painful.    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    Sandrine Denson, student NP           Review of Systems   ROS COMP: Constitutional, HEENT, cardiovascular, pulmonary, GI, , musculoskeletal, neuro, skin, endocrine and psych systems are negative, except as otherwise noted.      Objective    /66   Pulse 73   Temp 97.4  F (36.3  C) (Oral)   Ht 1.705 m (5' 7.13\")   Wt 77.7 kg (171 lb 4.8 oz) " "  SpO2 99%   BMI 26.73 kg/m    Body mass index is 26.73 kg/m .  Physical Exam   GENERAL: healthy, alert and no distress  EYES: Eyes grossly normal to inspection, PERRL and conjunctivae and sclerae normal  HENT: ear canals and TM's normal, nose and mouth without ulcers or lesions  NECK: no adenopathy, no asymmetry, masses, or scars and thyroid normal to palpation  RESP: lungs clear to auscultation - no rales, rhonchi or wheezes  CV: regular rate and rhythm, normal S1 S2, no S3 or S4, no murmur, click or rub, no peripheral edema and peripheral pulses strong  ABDOMEN: soft, nontender, no hepatosplenomegaly, no masses and bowel sounds normal  MS: no gross musculoskeletal defects noted, no edema  PSYCH: mentation appears normal  MENTAL STATUS EXAM  Appearance: appropriate  Attitude: cooperative  Behavior: normal  Eye Contact: normal  Speech: normal  Orientation: oriented to person , place, time and situation  Mood: \"doing well\"   Affect: Appropriate/mood-congruent, Subdued   Thought Process: clear  Suicidal Ideation: reports no thoughts, no intention      Diagnostic Test Results:  Labs reviewed in Epic  none         Assessment & Plan     (F33.1) Major depressive disorder, recurrent episode, moderate (H)  (primary encounter diagnosis)  Comment: The patient's depression is under good control. She is currently taking lamotrigine and Wellbutrin. She is interested in weaning off the lamotrigine and requests a slow wean.   Plan: lamoTRIgine (LAMICTAL) 25 MG tablet, WELLBUTRIN         MG 24 hr tablet, DISCONTINUED: buPROPion        (WELLBUTRIN XL) 150 MG 24 hr tablet,         DISCONTINUED: lamoTRIgine (LAMICTAL) 100 MG         tablet        We agreed to slowly wean the lamotrigine as follows; 75 mg for up to 2 weeks, 50mg for up to 2 weeks, 25 mg for up to 2 weeks, then stop. Recommend evaluation approximately 2 months after fully stopping lamotrigine. Continue Wellbutrin 150mg once daily. We also discussed other ways " to optimize depression management; therapy, regular exercise, healthy eating, meditation, etc.   Enalapril is category L2 and not a concern for infant.  Patient's blood pressure is completely normal.  Advised patient to ask her pharmacy to track back her lot numbers of refills.    (E03.9) Hypothyroidism, unspecified type  Comment: The patient has a history of hypothyroidism and is taking levothyroxine 50mcg. Her TSH/T4 levels have not been checked post-partum.  Plan: TSH, T4 free        We will refill appropriate levothyroxine dose after lab work is obtained.     (F32.9) Depression, unspecified depression type  Comment:   Plan:     (N92.6) Irregular bleeding  Comment: The patient reports irregular bleeding after starting the mini-pill earlier this week. She is currently breastfeeding.   Plan: Continue to monitor bleeding. It is difficult to determine if recent, heavy bleeding was an actual period or not and/or if the mini-pill is working. If period returns next month, then we will need to discuss another option for birth control. Use condoms as back-up    (N94.10) Pain in female genitalia on intercourse  Plan: Recommend silicone lubricant.    Patient Instructions   Reduce lamictal to 50 mg for 30 days and then stop  Evaluate discontinuation about 2 months after fully stopping so about in 3 months from today    Often I recommend patients to maximize other ways they address depression - increase therapy, make sure you are exercising daily, optimize stress reduction mechanisms/meditation, make sure support systems are in place    Consider taking vitamin D [id number] international unit(s)     Use back up contraception (condoms) until we have a sense if you are having periods or not    Try a silicone lubrication product - 100% silicone - online or at [facility] and then use condoms without lubrication      Return in about 3 months (around 4/15/2020) for mental health check.    Gauri Brice, ISAIAS [facility]  [facility]

## 2020-01-15 ENCOUNTER — OFFICE VISIT (OUTPATIENT)
Dept: FAMILY MEDICINE | Facility: CLINIC | Age: 30
End: 2020-01-15
Payer: COMMERCIAL

## 2020-01-15 VITALS
SYSTOLIC BLOOD PRESSURE: 128 MMHG | TEMPERATURE: 97.4 F | WEIGHT: 171.3 LBS | OXYGEN SATURATION: 99 % | BODY MASS INDEX: 26.89 KG/M2 | HEIGHT: 67 IN | HEART RATE: 73 BPM | DIASTOLIC BLOOD PRESSURE: 66 MMHG

## 2020-01-15 DIAGNOSIS — E03.9 HYPOTHYROIDISM, UNSPECIFIED TYPE: ICD-10-CM

## 2020-01-15 DIAGNOSIS — F32.A DEPRESSION, UNSPECIFIED DEPRESSION TYPE: ICD-10-CM

## 2020-01-15 DIAGNOSIS — N92.6 IRREGULAR BLEEDING: ICD-10-CM

## 2020-01-15 DIAGNOSIS — N94.10 PAIN IN FEMALE GENITALIA ON INTERCOURSE: ICD-10-CM

## 2020-01-15 DIAGNOSIS — F33.1 MAJOR DEPRESSIVE DISORDER, RECURRENT EPISODE, MODERATE (H): Primary | ICD-10-CM

## 2020-01-15 LAB
ASTHMA CONTROL TEST SCORE: 23
ER ASTHMA: 0
HOSPITALIZATION ASTHMA: 0

## 2020-01-15 PROCEDURE — 36415 COLL VENOUS BLD VENIPUNCTURE: CPT | Performed by: NURSE PRACTITIONER

## 2020-01-15 PROCEDURE — 84443 ASSAY THYROID STIM HORMONE: CPT | Performed by: NURSE PRACTITIONER

## 2020-01-15 PROCEDURE — 84439 ASSAY OF FREE THYROXINE: CPT | Performed by: NURSE PRACTITIONER

## 2020-01-15 PROCEDURE — 99214 OFFICE O/P EST MOD 30 MIN: CPT | Performed by: NURSE PRACTITIONER

## 2020-01-15 RX ORDER — BUPROPION HYDROCHLORIDE 150 MG/1
150 TABLET ORAL EVERY MORNING
Qty: 90 TABLET | Refills: 1 | Status: SHIPPED | OUTPATIENT
Start: 2020-01-15 | End: 2020-01-15

## 2020-01-15 RX ORDER — BUPROPION HCL 150 MG
150 TABLET, EXTENDED RELEASE 24 HR ORAL EVERY MORNING
Qty: 90 TABLET | Refills: 1 | Status: SHIPPED | OUTPATIENT
Start: 2020-01-15 | End: 2020-01-30

## 2020-01-15 RX ORDER — LAMOTRIGINE 25 MG/1
TABLET ORAL
Qty: 84 TABLET | Refills: 0 | Status: SHIPPED | OUTPATIENT
Start: 2020-01-15 | End: 2020-04-07

## 2020-01-15 RX ORDER — LAMOTRIGINE 100 MG/1
50 TABLET ORAL DAILY
Qty: 15 TABLET | Refills: 1 | Status: SHIPPED | OUTPATIENT
Start: 2020-01-15 | End: 2020-01-15

## 2020-01-15 ASSESSMENT — MIFFLIN-ST. JEOR: SCORE: 1531.64

## 2020-01-15 NOTE — PATIENT INSTRUCTIONS
Reduce lamictal to 50 mg for 30 days and then stop  Evaluate discontinuation about 2 months after fully stopping so about in 3 months from today    Often I recommend patients to maximize other ways they address depression - increase therapy, make sure you are exercising daily, optimize stress reduction mechanisms/meditation, make sure support systems are in place    Consider taking vitamin D 6211-0271 international unit(s)     Use back up contraception (condoms) until we have a sense if you are having periods or not    Try a silicone lubrication product - 100% silicone - online or at [facility] and then use condoms without lubrication

## 2020-01-16 LAB
T4 FREE SERPL-MCNC: 0.92 NG/DL (ref 0.76–1.46)
TSH SERPL DL<=0.005 MIU/L-ACNC: 1.42 MU/L (ref 0.4–4)

## 2020-01-16 RX ORDER — LEVOTHYROXINE SODIUM 50 UG/1
50 TABLET ORAL DAILY
Qty: 90 TABLET | Refills: 3 | Status: SHIPPED | OUTPATIENT
Start: 2020-01-16 | End: 2020-12-01

## 2020-01-16 ASSESSMENT — ASTHMA QUESTIONNAIRES: ACT_TOTALSCORE: 23

## 2020-01-16 NOTE — RESULT ENCOUNTER NOTE
Mechelle,    Your thyroid levels are normal. Continue levothyroxine 50mcg once daily. I will electronically send in the refill for you.  If you have any questions, please feel free to contact the clinic.    KARLY Child

## 2020-01-30 ENCOUNTER — MYC MEDICAL ADVICE (OUTPATIENT)
Dept: FAMILY MEDICINE | Facility: CLINIC | Age: 30
End: 2020-01-30

## 2020-01-30 DIAGNOSIS — F33.1 MAJOR DEPRESSIVE DISORDER, RECURRENT EPISODE, MODERATE (H): ICD-10-CM

## 2020-01-30 RX ORDER — BUPROPION HCL 150 MG
150 TABLET, EXTENDED RELEASE 24 HR ORAL EVERY MORNING
Qty: 90 TABLET | Refills: 1 | Status: SHIPPED | OUTPATIENT
Start: 2020-01-30 | End: 2020-04-09

## 2020-03-10 ENCOUNTER — MYC MEDICAL ADVICE (OUTPATIENT)
Dept: FAMILY MEDICINE | Facility: CLINIC | Age: 30
End: 2020-03-10

## 2020-03-10 DIAGNOSIS — J45.20 MILD INTERMITTENT ASTHMA WITHOUT COMPLICATION: Primary | ICD-10-CM

## 2020-03-11 NOTE — TELEPHONE ENCOUNTER
Requested Prescriptions   Pending Prescriptions Disp Refills     albuterol (PROVENTIL HFA) 108 (90 Base) MCG/ACT inhaler        Sig: Inhale 2 puffs into the lungs   Last Written Prescription Date:  2/6/15  Last Fill Quantity: na,  # refills: na   Last office visit: 1/15/2020 with prescribing provider:     Future Office Visit:   Next 5 appointments (look out 90 days)    Apr 15, 2020  9:00 AM CDT  Office Visit with ISAIAS Rincon CNP  [facility] ([facility]) [address] [place] [id number] [phone]             There is no refill protocol information for this order

## 2020-03-11 NOTE — TELEPHONE ENCOUNTER
Routing refill request to provider for review/approval because:  A break in medication    Jazmyn Connor RN   [facility]

## 2020-03-12 RX ORDER — ALBUTEROL SULFATE 90 UG/1
2 AEROSOL, METERED RESPIRATORY (INHALATION) EVERY 6 HOURS PRN
Qty: 18 G | Refills: 5 | Status: SHIPPED | OUTPATIENT
Start: 2020-03-12 | End: 2020-03-18

## 2020-03-12 NOTE — TELEPHONE ENCOUNTER
"[facility] sent a fax regarding   albuterol (PROVENTIL HFA) 108 (90 Base) MCG/ACT inhaler   Stating \"YAMIL 1 required per insurance. Please edit X and resend\"      "

## 2020-03-17 ENCOUNTER — MYC MEDICAL ADVICE (OUTPATIENT)
Dept: FAMILY MEDICINE | Facility: CLINIC | Age: 30
End: 2020-03-17

## 2020-03-17 DIAGNOSIS — J45.20 MILD INTERMITTENT ASTHMA WITHOUT COMPLICATION: ICD-10-CM

## 2020-03-18 RX ORDER — ALBUTEROL SULFATE 90 UG/1
2 AEROSOL, METERED RESPIRATORY (INHALATION) EVERY 6 HOURS PRN
Qty: 18 G | Refills: 5 | Status: SHIPPED | OUTPATIENT
Start: 2020-03-18 | End: 2022-04-25

## 2020-04-07 ENCOUNTER — TELEPHONE (OUTPATIENT)
Dept: FAMILY MEDICINE | Facility: CLINIC | Age: 30
End: 2020-04-07

## 2020-04-07 NOTE — PROGRESS NOTES
"Subjective     Mechelle Mata is a 29 year old female who is being evaluated via a billable telephone visit.      The patient has been notified of following:     \"This telephone visit will be conducted via a call between you and your physician/provider. We have found that certain health care needs can be provided without the need for a physical exam.  This service lets us provide the care you need with a short phone conversation.  If a prescription is necessary we can send it directly to your pharmacy.  If lab work is needed we can place an order for that and you can then stop by our lab to have the test done at a later time.    Telephone visits are billed at different rates depending on your insurance coverage. During this emergency period, for some insurers they may be billed the same as an in-person visit.  Please reach out to your insurance provider with any questions.    If during the course of the call the physician/provider feels a telephone visit is not appropriate, you will not be charged for this service.\"    Patient has given verbal consent for Telephone visit?  Yes    Mechelle Mata complains of   Chief Complaint   Patient presents with     Recheck Medication       ALLERGIES  Patient has no known allergies.     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  Daughter not at  the last two weeks, but will go back next week when  reopens    Depression Followup    How are you doing with your depression since your last visit? Slightly worsened    Are you having other symptoms that might " be associated with depression? No    Have you had a significant life event?  OTHER: pandemic     Are you feeling anxious or having panic attacks?   Yes:  as above    Do you have any concerns with your use of alcohol or other drugs? No      Social History     Tobacco Use     Smoking status: Never Smoker     Smokeless tobacco: Never Used   Substance Use Topics     Alcohol use: Not Currently     Drug use: Never     PHQ 5/8/2019 12/2/2019 12/30/2019   PHQ-9 Total Score 2 0 2   Q9: Thoughts of better off dead/self-harm past 2 weeks Not at all Not at all Not at all     LUCIO-7 SCORE 12/2/2019 12/30/2019   Total Score 0 0       In the past two weeks have you had thoughts of suicide or self-harm?  No.    Do you have concerns about your personal safety or the safety of others?   No    Suicide Assessment Five-step Evaluation and Treatment (SAFE-T)      Reviewed and updated as needed this visit by Provider         Review of Systems   ROS:5 point ROS including CONST, HEENT, Respiratory, CV, and GI other than that noted in the HPI,  is negative        Objective   Reported vitals:  There were no vitals taken for this visit.   healthy, alert and no distress  Psych: Alert and oriented times 3; coherent speech, normal   rate and volume, able to articulate logical thoughts, able   to abstract reason, no tangential thoughts, no hallucinations   or delusions  Her affect is Appropriate/mood-congruent and Bright       Diagnostic Test Results:  Labs reviewed in Epic  none         Assessment/Plan:  (J45.20) Mild intermittent asthma without complication  (primary encounter diagnosis)  Comment:   Plan: budesonide-formoterol (SYMBICORT) 80-4.5         MCG/ACT Inhaler    Discussed new MIESHA guidelines.  Difficult to say if appropriate to initiate new ICS admist pandemic, but ordered one for patient to have on hand and after pandemic can definitely transition to symbicort for rescue    (F33.1) Major depressive disorder, recurrent episode, moderate  (H)  Comment:   Plan: WELLBUTRIN  MG 24 hr tablet        Continue Wellbutrin dose.  Continue more exercise.  Add meditation    (L70.9) Acne, unspecified acne type  Comment:   Plan: clindamycin (CLINDAMAX) 1 % external gel  Refilled              Return in about 6 months (around 10/9/2020) for mental health check.    Time end:  6:27 PM    Time start:  6:07 PM    Phone call duration:  20 minutes    ISAIAS Larios CNP

## 2020-04-09 ENCOUNTER — VIRTUAL VISIT (OUTPATIENT)
Dept: FAMILY MEDICINE | Facility: CLINIC | Age: 30
End: 2020-04-09
Payer: COMMERCIAL

## 2020-04-09 DIAGNOSIS — L70.9 ACNE, UNSPECIFIED ACNE TYPE: ICD-10-CM

## 2020-04-09 DIAGNOSIS — J45.20 MILD INTERMITTENT ASTHMA WITHOUT COMPLICATION: Primary | ICD-10-CM

## 2020-04-09 DIAGNOSIS — F33.1 MAJOR DEPRESSIVE DISORDER, RECURRENT EPISODE, MODERATE (H): ICD-10-CM

## 2020-04-09 PROCEDURE — 99214 OFFICE O/P EST MOD 30 MIN: CPT | Mod: 95 | Performed by: NURSE PRACTITIONER

## 2020-04-09 RX ORDER — CLINDAMYCIN PHOSPHATE 10 MG/G
GEL TOPICAL 2 TIMES DAILY
Qty: 60 G | Refills: 3 | Status: SHIPPED | OUTPATIENT
Start: 2020-04-09 | End: 2020-12-01

## 2020-04-09 RX ORDER — BUDESONIDE AND FORMOTEROL FUMARATE DIHYDRATE 80; 4.5 UG/1; UG/1
2 AEROSOL RESPIRATORY (INHALATION) 2 TIMES DAILY
Qty: 10.2 G | Refills: 1 | Status: SHIPPED | OUTPATIENT
Start: 2020-04-09 | End: 2021-07-14

## 2020-04-09 RX ORDER — BUPROPION HCL 150 MG
150 TABLET, EXTENDED RELEASE 24 HR ORAL EVERY MORNING
Qty: 90 TABLET | Refills: 1 | Status: SHIPPED | OUTPATIENT
Start: 2020-04-09 | End: 2020-10-26

## 2020-04-09 NOTE — PATIENT INSTRUCTIONS
[email or web address]    Put in prescription for symbicort for asthma rescue

## 2020-04-15 ENCOUNTER — TELEPHONE (OUTPATIENT)
Dept: FAMILY MEDICINE | Facility: CLINIC | Age: 30
End: 2020-04-15

## 2020-04-15 ENCOUNTER — VIRTUAL VISIT (OUTPATIENT)
Dept: FAMILY MEDICINE | Facility: CLINIC | Age: 30
End: 2020-04-15
Payer: COMMERCIAL

## 2020-04-15 DIAGNOSIS — N61.0 MASTITIS, RIGHT, ACUTE: Primary | ICD-10-CM

## 2020-04-15 PROCEDURE — 99214 OFFICE O/P EST MOD 30 MIN: CPT | Mod: GT | Performed by: NURSE PRACTITIONER

## 2020-04-15 RX ORDER — DICLOXACILLIN SODIUM 250 MG
250 CAPSULE ORAL 4 TIMES DAILY
Qty: 40 CAPSULE | Refills: 0 | Status: SHIPPED | OUTPATIENT
Start: 2020-04-15 | End: 2020-08-05

## 2020-04-15 NOTE — PATIENT INSTRUCTIONS
"Pump at same minutes/times a day that you are currently until mastitis clears  Continue to switch sides with nursing, but when it is left side's turn, still offer right side for 1-2 minutes at first, then bulk of feeding on the left and offer right at end as normal.    Start dicloxicillin antibiotic    For Plugged ducts:    Nurse often  1. Use warm compress over plugged area and massage before starting to nurse  2. Breast compression while nursing  3. Ice on plugged area after nursing for comfort  4. Ibuprofen 600 mg for mother 3 times/day for pain control  5. Take off bra during nursing and keep clothing and bra loose to avoid pressure on milk ducts  6. Change nursing positions during one feeding or every other feeding  7.  Point baby's CHIN to the plugged duct.  This may require holding baby in odd positions or doing a \"dangle feeding\"  8.  Use electric toothbrush or gentle massager over the plugged area before nursing  9. Lecithin  Supplement for mother- 2 capsules (1200mg each ) - 3 times /day or      Lecithin liquid:   1 Tablespoon 3 times/day and reduce saturated fats in diet - this is often VERY helpful.  I also recommend moms consider continuing this if they have recurrent plugged ducts          "

## 2020-04-15 NOTE — TELEPHONE ENCOUNTER
Spoke with pt, she will do a video visit with provider this afternoon    Jazmyn Connor RN   [facility]

## 2020-04-15 NOTE — PROGRESS NOTES
"Mechelle Mata is a 29 year old female who is being evaluated via a billable video visit.      The patient has been notified of following:     \"This video visit will be conducted via a call between you and your physician/provider. We have found that certain health care needs can be provided without the need for an in-person physical exam.  This service lets us provide the care you need with a video conversation.  If a prescription is necessary we can send it directly to your pharmacy.  If lab work is needed we can place an order for that and you can then stop by our lab to have the test done at a later time.    Video visits are billed at different rates depending on your insurance coverage.  Please reach out to your insurance provider with any questions.    If during the course of the call the physician/provider feels a video visit is not appropriate, you will not be charged for this service.\"    Patient has given verbal consent for Video visit? Yes    How would you like to obtain your AVS? Elpidio    Patient would like the video invitation sent by: Text to cell phone: [phone]    Subjective     Mechelle Mata is a 29 year old female who presents to clinic today for the following health issues:      Video Start Time: 6:18 PM    Concern about mastitis for 1 day - yester  R breast starting to hurt and hard deep inside, like \"half an engorgement\"  Pain got increasingly worse  Nausea, headache, ill-feeling, tired this morning.  No fever.  No specific lump today, no redness  Has had clogged duct - this feels different  Warm bowl of water soak, cold last night, massage - feels better after it is emptied  Daughter 5 months on Sunday - has been teething, not eating solid foods yet  Had planned to donate milk, but to accepted so trying to go back down - pumping less time per pumping.  Same times, but thinks she is going too fast.    Reviewed and updated as needed this visit by Provider         Review of Systems   ROS COMP: " "  ROS:5 point ROS including CONST, HEENT, Respiratory, CV, and GI other than that noted in the HPI,  is negative         Objective    There were no vitals taken for this visit.  Estimated body mass index is 26.73 kg/m  as calculated from the following:    Height as of 1/15/20: 1.705 m (5' 7.13\").    Weight as of 1/15/20: 77.7 kg (171 lb 4.8 oz).  Physical Exam   GENERAL: healthy, alert and no distress  PSYCH: mentation appears normal, affect normal/bright    Diagnostic Test Results:  Labs reviewed in Epic  none         Assessment & Plan     (N61.0) Mastitis, right, acute  (primary encounter diagnosis)  Comment:   Plan: dicloxacillin (DYNAPEN) 250 MG capsule                    Patient Instructions   Pump at same minutes/times a day that you are currently until mastitis clears  Continue to switch sides with nursing, but when it is left side's turn, still offer right side for 1-2 minutes at first, then bulk of feeding on the left and offer right at end as normal.    Start dicloxicillin antibiotic    For Plugged ducts:    Nurse often  1. Use warm compress over plugged area and massage before starting to nurse  2. Breast compression while nursing  3. Ice on plugged area after nursing for comfort  4. Ibuprofen 600 mg for mother 3 times/day for pain control  5. Take off bra during nursing and keep clothing and bra loose to avoid pressure on milk ducts  6. Change nursing positions during one feeding or every other feeding  7.  Point baby's CHIN to the plugged duct.  This may require holding baby in odd positions or doing a \"dangle feeding\"  8.  Use electric toothbrush or gentle massager over the plugged area before nursing  9. Lecithin  Supplement for mother- 2 capsules (1200mg each ) - 3 times /day or      Lecithin liquid:   1 Tablespoon 3 times/day and reduce saturated fats in diet - this is often VERY helpful.  I also recommend moms consider continuing this if they have recurrent plugged ducts              Return in " about 6 months (around 10/15/2020) for mental health check.    ISAIAS Larios CNP  [facility]      Video-Visit Details    Type of service:  Video Visit    Video End Time (time video stopped): 6:39 PM    Originating Location (pt. Location): Home    Distant Location (provider location):  [facility]     Mode of Communication:  Video Conference via [employer]    [facility] [place] in about 6 months (around 10/15/2020) for mental health check.       ISAIAS Larios CNP

## 2020-04-15 NOTE — TELEPHONE ENCOUNTER
Reason for call:  Symptom   Symptom or request: mastitis    Duration (how long have symptoms been present): 1 day  Have you been treated for this before? No    Additional comments: Would like to discuss treatment options     Phone number to reach patient:  Home number on file [phone] (home)    Best Time:  n/a    Can we leave a detailed message on this number?  YES    Travel screening: Not Applicable

## 2020-04-21 ENCOUNTER — VIRTUAL VISIT (OUTPATIENT)
Dept: FAMILY MEDICINE | Facility: OTHER | Age: 30
End: 2020-04-21

## 2020-04-21 ENCOUNTER — TELEPHONE (OUTPATIENT)
Dept: FAMILY MEDICINE | Facility: CLINIC | Age: 30
End: 2020-04-21

## 2020-04-21 NOTE — TELEPHONE ENCOUNTER
Please advise on antibiotic use. Please clarify if you want Pt to continue dicloxacillin or switch Cephalexin?    Rx 4/15/20 for mastitis   dicloxacillin (DYNAPEN) 250 MG capsule  40 capsule  0  4/15/2020  4/25/2020  --    Sig - Route: Take 1 capsule (250 mg) by mouth 4 times daily for 10 days - Oral    Sent to pharmacy as: dicloxacillin (DYNAPEN) 250 MG capsule      Virtual visit 4/21 UTI   Prescription: cephalexin (Keflex) 500 mg oral capsule 14 capsule, 7 days supply. Take 1 capsule by mouth every 12 hours for 7 days. (Pt already had Rx from previous req-see visit notes)     I recommend taking one or the other not both antibiotics. I would contact your primary provider to discuss your symptoms. But if you are having UTI symptoms while on dicloxacillin then its reasonable to switch to cephalexin. Cephalexin 500mg twice daily is the usual dosage for UTI treatment.  Cephalexin treats most urinary tract infections as well as mastitis or cellulitis.    Yaya Lopez RN   [facility]

## 2020-04-21 NOTE — TELEPHONE ENCOUNTER
Spoke with Pt, she already stopped taking dicloxacillin and switched to cephalexin. She already has noticed improvement of her symptoms. Mychart msg sent to pt since she wanted to be sure it was safe to breastfeed while taking Cephalexin.   Per Dr. Lee ok to breastfeed while taking.     Yaya Lopez RN   [facility]

## 2020-04-21 NOTE — TELEPHONE ENCOUNTER
Reason for call:  Other   Patient called regarding (reason for call): call back  Additional comments: Pt is taking an antibiotic for mastitis and was then diagnosed with a UTI via [email or web address] last night and was told to take antibiotic for it, wondering if she should take two antibiotics.    Phone number to reach patient:  Home number on file [phone] (home)    Best Time:  n/a    Can we leave a detailed message on this number?  YES    Travel screening: Not Applicable

## 2020-04-21 NOTE — TELEPHONE ENCOUNTER
"See Una Rea's note, stop diclox    \"if you are having UTI symptoms while on dicloxacillin then its reasonable to switch to cephalexin. Cephalexin 500mg twice daily is the usual dosage for UTI treatment.  Cephalexin treats most urinary tract infections as well as mastitis or cellulitis.\"  "

## 2020-04-21 NOTE — TELEPHONE ENCOUNTER
Left message for patient. This is the nurse at [facility] office of Brandi Brice please give us a call back [phone].     See notes below per Dr. Lee regarding antibiotics.     Yaya Lopez RN   [facility]

## 2020-04-21 NOTE — PROGRESS NOTES
"Date: 2020 00:57:54  Clinician: Una Rea  Clinician NPI: [id number]  Patient: Mechelle Mata  Patient : 1990  Patient Address: [address] [place] [id number]  Patient Phone: [phone]  Visit Protocol: UTI  Patient Summary:  Mechelle is a 29 year old ( : 1990 ) female who initiated a Visit for a presumed bladder infection. When asked the question \"Please sign me up to receive news, health information and promotions from [employer].\", Mechelle responded \"No\".   Her symptoms started today and consist of dysuria, feeling as if the bladder is never empty, urinary frequency, and urgency.   Symptom details   Urine color: The color of her urine is yellow.    Denied symptoms include foul-smelling urine, nausea, flank pain, abdominal pain, chills, vaginal discharge, urinary incontinence, vomiting, and vaginal itching. She does not feel feverish.   Over-the-counter medications or home remedies used to relieve the current symptoms as reported by the patient (free text): Azo urinary tract defense   Precipitating events  Mechelle denies having a sexually transmitted disease.  Pertinent medical history  Mechelle has had a bladder infection before and has had 1 in the past 12 months. Her most recent bladder infection was not within the last 4 weeks. Her current symptoms are similar to her previous bladder infection symptoms.   Nitrofurantoin (Macrobid) and cephalexin (Keflex) has been effective in treating her past bladder infections.   Mechelle has not been prescribed antibiotics to prevent frequent or repeated bladder infections in the past and does not get yeast infections when she takes antibiotics. She has not experienced problems or side effects with any of the common antibiotics used to treat bladder infections.   Mechelle does not have a history of kidney stones. She has not used a catheter or been a patient in a hospital or nursing home in the past 2 weeks.   Mechelle does not smoke or use smokeless " tobacco.   She denies pregnancy and is breastfeeding. She does not menstruate.   Additional information as reported by the patient (free text): Mastitis- started taking dicloxacillin on Wednesday for treatment. Have a filled prescription for cephalexin 500mg in case I got a uti on vacation last year-can take this now for treatment if indicated. I'm breastfeeding.     MEDICATIONS: Prenatal oral, dicloxacillin oral, levothyroxine oral, Wellbutrin XL oral, ALLERGIES: NKDA  Clinician Response:  Dear Mechelle,  Based on the information you have provided, you likely have an acute urinary tract infection, also called a bladder infection. Bladder infections occur when bacteria from the outside of the body enters the urinary tract. Any part of the urinary system can be infected, but the bladder is the most common.  Medication information  I am prescribing:     Cephalexin (Keflex) 500 mg oral capsule. Take 1 capsule by mouth every 12 hours for 7 days. There are no refills with this prescription.   The medication I prescribed for your bladder infection is an antibiotic. Continue taking the medication until it is gone even if you feel better. If you get an upset stomach while taking antibiotics, taking the medication with food can help.   Yeast infections can be a common side effect of antibiotics. The most common symptom of a yeast infection is itchiness in and around the vagina. Other signs and symptoms include burning, redness, or a thick, white vaginal discharge that looks like cottage cheese and does not have a bad smell.  Self care  Urination helps to flush bacteria from the urinary tract. For this reason, drinking water and urinating often helps relieve some urinary symptoms and can decrease your risk of getting bladder infections in the future.  Other steps you can take to prevent future bladder infections include:     Wipe front to back after using the bathroom    Urinate after sexual intercourse    Avoid using deodorant  sprays, douches, or powders in the vaginal area     When to seek care  Please make an appointment to be seen in a clinic or urgent care if any of the following occur:     You develop new symptoms or your symptoms become worse    You have medication side effects that make it difficult to take them as prescribed    Your symptoms do not improve within 1-2 days of starting treatment    You have symptoms of a bladder infection that return shortly after completing treatment     It is possible to have an allergic reaction to an antibiotic even if you have not had one in the past. If you notice a new rash, significant swelling, or difficulty breathing, stop taking this medication immediately and go to a clinic or urgent care.   Diagnosis: Acute uncomplicated bladder infection  Diagnosis ICD: N39.0  Prescription: cephalexin (Keflex) 500 mg oral capsule 14 capsule, 7 days supply. Take 1 capsule by mouth every 12 hours for 7 days. Refills: 0, Refill as needed: no, Allow substitutions: yes  Addendum created: April 21 02:38:30, 2020 created by: Una Rea body: Azo should be avoided while breastfeeding.  Addendum created: April 21 02:23:43, 2020 created by: Una Rea body: I recommend taking one or the other not both antibiotics. I would contact your primary provider to discuss your symptoms. But if you are having UTI symptoms while on dicloxacillin then its reasonable to switch to cephalexin. Cephalexin 500mg twice daily is the usual dosage for UTI treatment.  Cephalexin treats most urinary tract infections as well as mastitis or cellulitis.

## 2020-06-03 ENCOUNTER — VIRTUAL VISIT (OUTPATIENT)
Dept: FAMILY MEDICINE | Facility: CLINIC | Age: 30
End: 2020-06-03
Payer: COMMERCIAL

## 2020-06-03 DIAGNOSIS — N39.0 RECURRENT UTI: Primary | ICD-10-CM

## 2020-06-03 PROCEDURE — 99213 OFFICE O/P EST LOW 20 MIN: CPT | Mod: GT | Performed by: NURSE PRACTITIONER

## 2020-06-03 RX ORDER — CEPHALEXIN 500 MG/1
500 CAPSULE ORAL 2 TIMES DAILY
Qty: 14 CAPSULE | Refills: 0 | Status: SHIPPED | OUTPATIENT
Start: 2020-06-03 | End: 2020-06-19

## 2020-06-03 NOTE — PATIENT INSTRUCTIONS
If you get another UTI, please schedule lab only visit for urine collection (lab is ordered pending), start antibiotics and contact me.    I will get back to you about azo tabs and d-mannose in breastfeeding    If d-mannose is ok with breastfeeding, then take this after each time that you have sex    For UTI or bladder infection:    Push fluids, especially cranberry juice(unsweetened better) to acidify urine and make it harder for bacteria to live.  Vit C at 500 mg may also help.  Take antibiotic as prescribed  I recommend yogurt, kefir or some source of healthy bacteria as the antibiotic kills your healthy bacteria as well as the bacteria in your urine.  Some women do get vagina yeast infections from antibiotics and you may notice vaginal itching or whitish discharge.  If that happens use non-prescription medication at a pharmacy for yeast vaginal infections-like clotrimazole    We will consider further work up if you continue to get frequent bladder infections    For prevention of bladder infections/UTIs:  cranberry powder capsules(11-12% quinic acid- 1-2 capsules/day) or unsweetend juice-6-16oz/day,   D-manose (natural sugar the washes away the most common bacteria causing bladder infections- 1/2 - 1 tsp every 2-3 hours while awake to treat infection and 1/2-1 tsp/day for prevention of chronic UTI),   Vit C 500mg/day to acidify the urine.

## 2020-06-03 NOTE — PROGRESS NOTES
"Mechelle Mata is a 29 year old female who is being evaluated via a billable video visit.      The patient has been notified of following:     \"This video visit will be conducted via a call between you and your physician/provider. We have found that certain health care needs can be provided without the need for an in-person physical exam.  This service lets us provide the care you need with a video conversation.  If a prescription is necessary we can send it directly to your pharmacy.  If lab work is needed we can place an order for that and you can then stop by our lab to have the test done at a later time.    Video visits are billed at different rates depending on your insurance coverage.  Please reach out to your insurance provider with any questions.    If during the course of the call the physician/provider feels a video visit is not appropriate, you will not be charged for this service.\"    Patient has given verbal consent for Video visit? Yes    How would you like to obtain your AVS? Elpidio    Patient would like the video invitation sent by: Text to cell phone:    [phone]      Will anyone else be joining your video visit? No    Subjective     Mechelle Mata is a 29 year old female who presents today via video visit for the following health issues:    HPI  URINARY TRACT SYMPTOMS      Duration: yesterday evening    Description  dysuria, hematuria (?), hesitancy    Intensity:  Moderate-severe    Accompanying signs and symptoms:  Fever/chills: no   Flank pain no   Nausea and vomiting: no   Vaginal symptoms: none  Abdominal/Pelvic Pain: no - does have bladder pain    History  UTI diagnosed virtually 4/21/20 - abx switched from dicloxicillin (which she was taking for mastitis) to cephalexin - took this; a month later had another UTI and had to go to urine test - UA \"lit up\" and prescribed cephalexin again  History of frequent UTI's: no - previously once a year  History of kidney stones: no   Sexually Active: " "YES - some discomfort - feels tight even when going slow  Possibility of pregnancy: No    Precipitating or alleviating factors: holding urine (no bathroom available), after sex    Therapies tried and outcome: course of antibiotics - cephalexin   Outcome: helpful      Video Start Time: 4:26 PM    I have reviewed and updated the patient's Past Medical History, Social History, Family History and Medication List      Reviewed and updated as needed this visit by Provider  Med Hx  Surg Hx  Fam Hx         Review of Systems   Constitutional, HEENT, cardiovascular, pulmonary, gi and gu systems are negative, except as otherwise noted.      Objective    There were no vitals taken for this visit.  Estimated body mass index is 26.73 kg/m  as calculated from the following:    Height as of 1/15/20: 1.705 m (5' 7.13\").    Weight as of 1/15/20: 77.7 kg (171 lb 4.8 oz).  Physical Exam     GENERAL: Healthy, alert and no distress  EYES: Eyes grossly normal to inspection.  No discharge or erythema, or obvious scleral/conjunctival abnormalities.  RESP: No audible wheeze, cough, or visible cyanosis.  No visible retractions or increased work of breathing.    SKIN: Visible skin clear. No significant rash, abnormal pigmentation or lesions.  NEURO: Cranial nerves grossly intact.  Mentation and speech appropriate for age.  PSYCH: Mentation appears normal, affect normal/bright, judgement and insight intact, normal speech and appearance well-groomed.      Diagnostic Test Results:  Labs reviewed in Epic  none         Assessment & Plan     (N39.0) Recurrent UTI  (primary encounter diagnosis)  Comment:   Plan: cephALEXin (KEFLEX) 500 MG capsule, *UA reflex         to Microscopic and Culture ([place] and [place]         Clinics (except [place] and [place])                Patient Instructions   If you get another UTI, please schedule lab only visit for urine collection (lab is ordered pending), start antibiotics and contact me.    I will get " back to you about azo tabs and d-mannose in breastfeeding    If d-mannose is ok with breastfeeding, then take this after each time that you have sex    For UTI or bladder infection:    Push fluids, especially cranberry juice(unsweetened better) to acidify urine and make it harder for bacteria to live.  Vit C at 500 mg may also help.  Take antibiotic as prescribed  I recommend yogurt, kefir or some source of healthy bacteria as the antibiotic kills your healthy bacteria as well as the bacteria in your urine.  Some women do get vagina yeast infections from antibiotics and you may notice vaginal itching or whitish discharge.  If that happens use non-prescription medication at a pharmacy for yeast vaginal infections-like clotrimazole    We will consider further work up if you continue to get frequent bladder infections    For prevention of bladder infections/UTIs:  cranberry powder capsules(11-12% quinic acid- 1-2 capsules/day) or unsweetend juice-6-16oz/day,   D-manose (natural sugar the washes away the most common bacteria causing bladder infections- 1/2 - 1 tsp every 2-3 hours while awake to treat infection and 1/2-1 tsp/day for prevention of chronic UTI),   Vit C 500mg/day to acidify the urine.        Return in about 4 months (around 10/3/2020) for Physical Exam or pap.    ISAIAS Larios CNP  [facility]      Video-Visit Details    Type of service:  Video Visit    Video End Time:4:59 PM    Originating Location (pt. Location): Home    Distant Location (provider location):  [facility]     Platform used for Video Visit: Evelin    Return in about 4 months (around 10/3/2020) for Physical Exam or pap.       ISAIAS Larios CNP

## 2020-06-08 ENCOUNTER — MYC MEDICAL ADVICE (OUTPATIENT)
Dept: FAMILY MEDICINE | Facility: CLINIC | Age: 30
End: 2020-06-08

## 2020-06-08 DIAGNOSIS — R35.0 URINARY FREQUENCY: Primary | ICD-10-CM

## 2020-06-09 NOTE — TELEPHONE ENCOUNTER
Brandi,     See [employer] msg. Virtual visit on 6/3    Yaya Lopez RN   [facility]

## 2020-06-10 ENCOUNTER — MYC MEDICAL ADVICE (OUTPATIENT)
Dept: FAMILY MEDICINE | Facility: CLINIC | Age: 30
End: 2020-06-10

## 2020-06-15 NOTE — PROGRESS NOTES
"Subjective     Mechelle Mata is a 29 year old female who presents to clinic today for the following health issues:    HPI   URINARY TRACT SYMPTOMS  Onset: Last UTI two weeks ago    Description:   Painful urination (Dysuria): no            Frequency: YES  Blood in urine (Hematuria): no   Delay in urine (Hesitency): no     Intensity: severe when she has the UTI    Progression of Symptoms:  worsening    Accompanying Signs & Symptoms:  Fever/chills: no   Flank pain no   Nausea and vomiting: no   Any vaginal symptoms: none  Abdominal/Pelvic Pain: no     History:   History of frequent UTI's: YES  History of kidney stones: no   Sexually Active: YES  Possibility of pregnancy: No    Precipitating factors:   N/A    Therapies Tried and outcome: Had a UTI two weeks ago- otherwise is pretty good about fluid intake .      Reviewed and updated as needed this visit by Provider         Review of Systems     ROS:5 point ROS including CONST, HEENT, Respiratory, CV, and GI other than that noted in the HPI,  is negative         Objective    /72   Pulse 76   Temp 98  F (36.7  C) (Oral)   Resp 18   Ht 1.705 m (5' 7.13\")   Wt 72.8 kg (160 lb 8 oz)   LMP  (LMP Unknown)   SpO2 97%   BMI 25.04 kg/m    Body mass index is 25.04 kg/m .  Physical Exam   GENERAL: healthy, alert and no distress  ABDOMEN: soft, nontender, no hepatosplenomegaly, no masses and bowel sounds normal   (female): normal female external genitalia, normal urethral meatus, vaginal mucosa, normal cervix/adnexa/uterus without masses or discharge  PSYCH: mentation appears normal and affect flat    Diagnostic Test Results:  Labs reviewed in Epic  none         Assessment & Plan     (N39.0) Frequent UTI  (primary encounter diagnosis)  Comment:   Plan: VASILE PT, HAND, AND CHIROPRACTIC REFERRAL  Pelvic discomfort and frequent sensation to urinate.  Unclear if frequent UTIs because we haven't had labs for each occurrence.  Now have standing labs.  No cystocele noted on " exam, not vulvar skin condition to explain symptoms.  Patient already doing bladder training.  Encouraged pelvic physical therapy and urogyn visit.            There are no Patient Instructions on file for this visit.    Return in about 2 weeks (around 7/3/2020) for pelvic pt.    Please note greater than 50% of this 25 minute appointment were spent face-to-face in counseling with the patient of the issues described above in the history of present illness and in the plan, including discussion of exam findings, differential and next mikhail s of pelvic physical therapy and urogyn         ISAIAS Larios [facility]

## 2020-06-19 ENCOUNTER — OFFICE VISIT (OUTPATIENT)
Dept: FAMILY MEDICINE | Facility: CLINIC | Age: 30
End: 2020-06-19
Payer: COMMERCIAL

## 2020-06-19 VITALS
BODY MASS INDEX: 25.19 KG/M2 | WEIGHT: 160.5 LBS | TEMPERATURE: 98 F | DIASTOLIC BLOOD PRESSURE: 72 MMHG | RESPIRATION RATE: 18 BRPM | HEIGHT: 67 IN | OXYGEN SATURATION: 97 % | SYSTOLIC BLOOD PRESSURE: 104 MMHG | HEART RATE: 76 BPM

## 2020-06-19 DIAGNOSIS — N39.0 FREQUENT UTI: Primary | ICD-10-CM

## 2020-06-19 PROCEDURE — 99214 OFFICE O/P EST MOD 30 MIN: CPT | Performed by: NURSE PRACTITIONER

## 2020-06-19 ASSESSMENT — MIFFLIN-ST. JEOR: SCORE: 1482.65

## 2020-07-03 ENCOUNTER — THERAPY VISIT (OUTPATIENT)
Dept: PHYSICAL THERAPY | Facility: CLINIC | Age: 30
End: 2020-07-03
Payer: COMMERCIAL

## 2020-07-03 DIAGNOSIS — R35.0 URINARY FREQUENCY: ICD-10-CM

## 2020-07-03 DIAGNOSIS — R39.15 URGENCY OF URINATION: ICD-10-CM

## 2020-07-03 DIAGNOSIS — O34.219 MATERNAL CARE FOR UTERINE SCAR DUE TO PREVIOUS CESAREAN SECTION, DELIVERED: ICD-10-CM

## 2020-07-03 DIAGNOSIS — M62.89 PFD (PELVIC FLOOR DYSFUNCTION): ICD-10-CM

## 2020-07-03 DIAGNOSIS — N94.10 DYSPAREUNIA, FEMALE: ICD-10-CM

## 2020-07-03 PROCEDURE — 97140 MANUAL THERAPY 1/> REGIONS: CPT | Mod: GP | Performed by: PHYSICAL THERAPIST

## 2020-07-03 PROCEDURE — 97161 PT EVAL LOW COMPLEX 20 MIN: CPT | Mod: GP | Performed by: PHYSICAL THERAPIST

## 2020-07-03 PROCEDURE — 97110 THERAPEUTIC EXERCISES: CPT | Mod: GP | Performed by: PHYSICAL THERAPIST

## 2020-07-03 PROCEDURE — 97112 NEUROMUSCULAR REEDUCATION: CPT | Mod: GP | Performed by: PHYSICAL THERAPIST

## 2020-07-03 NOTE — PROGRESS NOTES
[facility] Initial Evaluation  Subjective:    Patient Health History  Mechelle Mata being seen for Possible pelvic floor dysfunction.     Problem began: 3/15/2020.   Problem occurred: Likely pregnancy/   Pain is reported as 0/10 on pain scale.  General health as reported by patient is good.  Pertinent medical history includes: asthma, changes in bowel/bladder, depression, migraines/headaches and thyroid problems.     Medical allergies: none.   Surgeries include:  Other. Other surgery history details: C section, ganglion cyst removal, wisdom teeth removal, ear tubes.    Current medications:  Anti-depressants, thyroid medication and other. Other medications details: Mini pill.    Current occupation is Behavioral health therapist.   Primary job tasks include:  Computer work and prolonged sitting.                  Therapist Generated HPI Evaluation  Problem details: MD order 20. Mechelle had baby 7.5 months ago, C- section. She was getting UTI once a year for many years. She got mastitis in jay[place] and got UTI and switched to different antibiotic. She got another UTI under a month and was given another antibiotic. 2 weeks later she got another UTI- burning sensation, constant pain in her urethra. She was sitting in bathtub for hours, pain, bladder pain. She saw PCP and was referred to OBGYN. She thinks its PFD right now. She has increased pain from her scar tissue, workouts are painful. She has discomfort during intercourse with initial penetration. She had urgency and frequency every 30 min-60 min currently 1.5 to 2 hrs. She had increased bowel movement due to smaller amounts of defecation every time. She does not have UI as she uses the restroom more often. She was sent to PT for further management. .         Type of problem:  Incontinence and pelvic dysfunction.    This is a chronic condition.  Condition occurred with:  After pregnancy.    Patient reports pain:  N/a and pubic (uretheral  pain).                                       Objective:  System                                 Pelvic Dysfunction Evaluation:        Flexibility:    Tightness present at:Adductors; Iliopsoas; Piriformis and Gluteals    Abdominal Wall:    Diastasis Recti:  Normal  Trigger Points:  Transverse abdominals  Scar Mobility:  T[place]renss present 3/5  Pelvic Clock Exam:  normal                External Assessment:    Skin Condition:  Irritation  Scars:  Well healed  Bearing Down/Coughing:  Normal  Tissue Symmetry:  Normal  Introitus:  Normal  Muscle Contraction/Perineal Mobility:  Slight lift, no urogential triangle descent  Internal Assessment:  Internal assessment pelvic: unable to relax the PFM.  Sensory Exam:  Normal  Contraction/Grade:  Weak squeeze, 2 second hold (2)          Additional History:  Delivery History:    Number of Pregnancies: 1  Number of Live Births: 1  Caffeine Consumption:  None                     General     ROS    Assessment/Plan:    Patient is a 30 year old female with pelvic complaints.    Patient has the following significant findings with corresponding treatment plan.                Diagnosis 1:  PFD- Urgency, Frequency, Dyspareunia  Pain -  hot/cold therapy, manual therapy, STS, self management, education and home program  Decreased ROM/flexibility - manual therapy, therapeutic exercise, therapeutic activity and home program  Decreased strength - therapeutic exercise, therapeutic activities and home program  Inflammation - cold therapy, electric stimulation and self management/home program  Decreased function - therapeutic activities and home program    Therapy Evaluation Codes:   1) History comprised of:   Personal factors that impact the plan of care:      Time since onset of symptoms.    Comorbidity factors that impact the plan of care are:      None.     Medications impacting care: None.  2) Examination of Body Systems comprised of:   Body structures and functions that impact the  plan of care:      Pelvis.   Activity limitations that impact the plan of care are:      Frequency, [place] and Urgency.  3) Clinical presentation characteristics are:   Stable/Uncomplicated.  4) Decision-Making    Low complexity using standardized patient assessment instrument and/or measureable assessment of functional outcome.  Cumulative Therapy Evaluation is: Low complexity.    Previous and current functional limitations:  (See Goal Flow Sheet for this information)    Short term and Long term goals: (See Goal Flow Sheet for this information)     Communication ability:  Patient appears to be able to clearly communicate and understand verbal and written communication and follow directions correctly.  Treatment Explanation - The following has been discussed with the patient:   RX ordered/plan of care  Anticipated outcomes  Possible risks and side effects  This patient would benefit from PT intervention to resume normal activities.   Rehab potential is good.    Frequency:  1 X every other week, once daily  Duration:  for 12 weeks  Discharge Plan:  Achieve all LTG.  Independent in home treatment program.  Reach maximal therapeutic benefit.    Please refer to the daily flowsheet for treatment today, total treatment time and time spent performing 1:1 timed codes.

## 2020-07-03 NOTE — LETTER
[facility] PHYSICAL THERAPY  [address]  [place] [address]  [phone]    July 3, 2020    Re: Mechelle Mata   :   1990  MRN:  [id number]   REFERRING PHYSICIAN:   Gauri Brice    [facility] PHYSICAL THERAPY    Date of Initial Evaluation:  7/3/2020  Visits:  Rxs Used: 1  Reason for Referral:     PFD (pelvic floor dysfunction)  Urgency of urination  Urinary frequency  Dyspareunia, female  Maternal care for uterine scar due to previous  section, delivered    EVALUATION SUMMARY    [facility] Initial Evaluation  Subjective:    Patient Health History  Mechelle Mata being seen for Possible pelvic floor dysfunction.   Problem began: 3/15/2020.   Problem occurred: Likely pregnancy/   Pain is reported as 0/10 on pain scale.  General health as reported by patient is good.  Pertinent medical history includes: asthma, changes in bowel/bladder, depression, migraines/headaches and thyroid problems.   Medical allergies: none.   Surgeries include:  Other. Other surgery history details: C section, ganglion cyst removal, wisdom teeth removal, ear tubes.    Current medications:  Anti-depressants, thyroid medication and other. Other medications details: Mini pill.    Current occupation is Behavioral health therapist.   Primary job tasks include:  Computer work and prolonged sitting.   Therapist Generated HPI Evaluation  Problem details: MD order 20. Mechelle had baby 7.5 months ago, C- section. She was getting UTI once a year for many years. She got mastitis in jaylan and got UTI and switched to different antibiotic. She got another UTI under a month and was given another antibiotic. 2 weeks later she got another UTI- burning sensation, constant pain in her urethra. She was sitting in bathtub for hours, pain, bladder pain. She saw PCP and was referred to OBGYN. She thinks its PFD  right now. She has increased pain from her scar tissue, workouts are painful. She has discomfort during intercourse with initial penetration. She had urgency and frequency every 30 min-60 min currently 1.5 to 2 hrs. She had increased bowel movement due to smaller amounts of defecation every time. She does not have UI as she uses the restroom more often. She was sent to PT for further management. .         Type of problem:  Incontinence and pelvic dysfunction.  This is a chronic condition.  Condition occurred with:  After pregnancy.  Patient reports pain:  N/a and pubic (uretheral pain).  Objective:  Pelvic Dysfunction Evaluation:    Flexibility:    Tightness present at:Adductors; Iliopsoas; Piriformis and Gluteals  Abdominal Wall:    Diastasis Recti:  Normal  Trigger Points:  Transverse abdominals  Scar Mobility:  [place] present 3/5  Pelvic Clock Exam:  normal  External Assessment:    Skin Condition:  Irritation  Scars:  Well healed  Bearing Down/Coughing:  Normal  Tissue Symmetry:  Normal  Introitus:  Normal  Muscle Contraction/Perineal Mobility:  Slight lift, no urogential triangle descent  Internal Assessment:  Internal assessment pelvic: unable to relax the PFM.  Sensory Exam:  Normal  Contraction/Grade:  Weak squeeze, 2 second hold (2)  Additional History:  Delivery History:    Number of Pregnancies: 1  Number of Live Births: 1  Caffeine Consumption:  None      Assessment/Plan:    Patient is a 30 year old female with pelvic complaints.    Patient has the following significant findings with corresponding treatment plan.                Diagnosis 1:  PFD- Urgency, Frequency, Dyspareunia  Pain -  hot/cold therapy, manual therapy, STS, self management, education and home program  Decreased ROM/flexibility - manual therapy, therapeutic exercise, therapeutic activity and home program  Decreased strength - therapeutic exercise, therapeutic activities and home program  Inflammation - cold therapy, electric  stimulation and self management/home program  Decreased function - therapeutic activities and home program    Therapy Evaluation Codes:   1) History comprised of:   Personal factors that impact the plan of care:      Time since onset of symptoms.    Comorbidity factors that impact the plan of care are:      None.     Medications impacting care: None.  2) Examination of Body Systems comprised of:   Body structures and functions that impact the plan of care:      Pelvis.   Activity limitations that impact the plan of care are:      Frequency, [place] and Urgency.  3) Clinical presentation characteristics are:   Stable/Uncomplicated.  4) Decision-Making    Low complexity using standardized patient assessment instrument and/or measureable assessment of functional outcome.  Cumulative Therapy Evaluation is: Low complexity.    Previous and current functional limitations:  (See Goal Flow Sheet for this information)    Short term and Long term goals: (See Goal Flow Sheet for this information)     Communication ability:  Patient appears to be able to clearly communicate and understand verbal and written communication and follow directions correctly.  Treatment Explanation - The following has been discussed with the patient:   RX ordered/plan of care  Anticipated outcomes  Possible risks and side effects  This patient would benefit from PT intervention to resume normal activities.   Rehab potential is good.    Frequency:  1 X every other week, once daily  Duration:  for 12 weeks  Discharge Plan:  Achieve all LTG.  Independent in home treatment program.  Reach maximal therapeutic benefit.       Thank you for your referral.    INQUIRIES  Therapist: Melody Hearn, PT   [facility]  [address] [address]  Phone: [phone]  Fax: [phone]

## 2020-07-14 ENCOUNTER — TELEPHONE (OUTPATIENT)
Dept: FAMILY MEDICINE | Facility: CLINIC | Age: 30
End: 2020-07-14

## 2020-07-14 NOTE — TELEPHONE ENCOUNTER
Reason for Call: Request for an order or referral:    Order or referral being requested: Covid 19- PT has order from [facility] but would like Porsha Miguel to order since daughter is being tested from [facility] order.    Date needed: as soon as possible    Has the patient been seen by the PCP for this problem? NO    Additional comments: PT would like to be tested at the same time as daughter, 7/15 at 8:40am.  Suzanne Mata.  Female, 7 month old, 11/19/2019  [phone]  MRN:   [id number]      Phone number Patient can be reached at:  Home number on file [phone] (home)    Best Time:  Anytime    Can we leave a detailed message on this number?  YES    Call taken on 7/14/2020 at 4:49 PM by Marisol De La Cruz

## 2020-07-14 NOTE — TELEPHONE ENCOUNTER
Patient would need an appointment with Provider in order to have a test ordered. She is not exhibiting symptoms and has a test ordered through [facility] for Friday.      Lore Mobley RN

## 2020-07-17 ENCOUNTER — THERAPY VISIT (OUTPATIENT)
Dept: PHYSICAL THERAPY | Facility: CLINIC | Age: 30
End: 2020-07-17
Payer: COMMERCIAL

## 2020-07-17 ENCOUNTER — MYC MEDICAL ADVICE (OUTPATIENT)
Dept: FAMILY MEDICINE | Facility: CLINIC | Age: 30
End: 2020-07-17

## 2020-07-17 DIAGNOSIS — N94.10 DYSPAREUNIA, FEMALE: ICD-10-CM

## 2020-07-17 DIAGNOSIS — R35.0 URINARY FREQUENCY: ICD-10-CM

## 2020-07-17 DIAGNOSIS — O34.219 MATERNAL CARE FOR UTERINE SCAR DUE TO PREVIOUS CESAREAN SECTION, DELIVERED: ICD-10-CM

## 2020-07-17 DIAGNOSIS — M62.89 PFD (PELVIC FLOOR DYSFUNCTION): ICD-10-CM

## 2020-07-17 DIAGNOSIS — R39.15 URGENCY OF URINATION: ICD-10-CM

## 2020-07-17 PROCEDURE — 97140 MANUAL THERAPY 1/> REGIONS: CPT | Mod: GP | Performed by: PHYSICAL THERAPIST

## 2020-07-17 PROCEDURE — 97112 NEUROMUSCULAR REEDUCATION: CPT | Mod: GP | Performed by: PHYSICAL THERAPIST

## 2020-07-17 PROCEDURE — 97110 THERAPEUTIC EXERCISES: CPT | Mod: GP | Performed by: PHYSICAL THERAPIST

## 2020-07-20 NOTE — TELEPHONE ENCOUNTER
Response sent to patient via [employer].     urogyn appt notes:  left message needs a video appointemnt for this per dr. fuentes- 7-17-20-(screened for COVID, informed 6th floor, mask, no visitors)Urinary frequency - Referred by Gauri Brice - Per pt    Yaya Lopez RN   [facility]

## 2020-08-05 ENCOUNTER — VIRTUAL VISIT (OUTPATIENT)
Dept: UROLOGY | Facility: CLINIC | Age: 30
End: 2020-08-05
Attending: NURSE PRACTITIONER
Payer: COMMERCIAL

## 2020-08-05 ENCOUNTER — MYC MEDICAL ADVICE (OUTPATIENT)
Dept: FAMILY MEDICINE | Facility: CLINIC | Age: 30
End: 2020-08-05

## 2020-08-05 DIAGNOSIS — M62.89 PFD (PELVIC FLOOR DYSFUNCTION): ICD-10-CM

## 2020-08-05 DIAGNOSIS — K59.00 CONSTIPATION, UNSPECIFIED CONSTIPATION TYPE: ICD-10-CM

## 2020-08-05 DIAGNOSIS — Z87.440 PERSONAL HISTORY OF URINARY TRACT INFECTION: ICD-10-CM

## 2020-08-05 DIAGNOSIS — N30.00 ACUTE CYSTITIS WITHOUT HEMATURIA: Primary | ICD-10-CM

## 2020-08-05 DIAGNOSIS — R35.0 URINARY FREQUENCY: ICD-10-CM

## 2020-08-05 DIAGNOSIS — N94.10 DYSPAREUNIA, FEMALE: ICD-10-CM

## 2020-08-05 DIAGNOSIS — M62.89 PELVIC FLOOR DYSFUNCTION: Primary | ICD-10-CM

## 2020-08-05 DIAGNOSIS — R39.15 URGENCY OF URINATION: ICD-10-CM

## 2020-08-05 DIAGNOSIS — Z98.891 S/P CESAREAN SECTION: ICD-10-CM

## 2020-08-05 SDOH — HEALTH STABILITY: MENTAL HEALTH: HOW OFTEN DO YOU HAVE A DRINK CONTAINING ALCOHOL?: 2-4 TIMES A MONTH

## 2020-08-05 SDOH — HEALTH STABILITY: MENTAL HEALTH: HOW OFTEN DO YOU HAVE 6 OR MORE DRINKS ON ONE OCCASION?: NEVER

## 2020-08-05 SDOH — HEALTH STABILITY: MENTAL HEALTH: HOW MANY STANDARD DRINKS CONTAINING ALCOHOL DO YOU HAVE ON A TYPICAL DAY?: 1 OR 2

## 2020-08-05 NOTE — PATIENT INSTRUCTIONS
Websites with free information:    American Urogynec[employer] Society patient website: www.voicesforpfd.org    Total Control Program: www.totalcontrolprogram.com    Supplements to prevent UTI to consider  -probiotics  -cranberry   Ellura: www.myellura.com   Theracran HP by [employer] [facility] [address]  -d-mannose  -Vitamin C 500 mg to 1000 mg twice a day    Daily fiber supplement (the kind that you mix in the water)    Continue the pelvic floor physical therapy    If you think have a urine infection please contact us at [phone] or [phone]    It was a pleasure meeting with you today.  Thank you for allowing me and my team the privilege of caring for you today.  YOU are the reason we are here, and I truly hope we provided you with the excellent service you deserve.  Please let us know if there is anything else we can do for you so that we can be sure you are leaving completely satisfied with your care experience.

## 2020-08-05 NOTE — PROGRESS NOTES
"August 5, 2020    Virtual visit with urology today- patient will be expecting contact by a text on her iphone- she knows she will be charged for a visit.  Allergies meds reviewed.    Mechelle Mata is a 30 year old female who is being evaluated via a billable video visit.      The patient has been notified of following:     \"This video visit will be conducted via a call between you and your physician/provider. We have found that certain health care needs can be provided without the need for an in-person physical exam.  This service lets us provide the care you need with a video conversation.  If a prescription is necessary we can send it directly to your pharmacy.  If lab work is needed we can place an order for that and you can then stop by our lab to have the test done at a later time.    Video visits are billed at different rates depending on your insurance coverage.  Please reach out to your insurance provider with any questions.    If during the course of the call the physician/provider feels a video visit is not appropriate, you will not be charged for this service.\"    Patient has given verbal consent for Video visit? Yes  How would you like to obtain your AVS? MyChart  If you are dropped from the video visit, the video invite should be resent to: Text to cell phone: [phone]  Will anyone else be joining your video visit? No        Video-Visit Details    Type of service:  Video Visit    Video Start Time: 0912  Video End Time: 0941    Originating Location (pt. Location): Home    Distant Location (provider location):  [facility]     Platform used for Video Visit: Timothy did not work     Referring Provider: ISAIAS Rincon CNP  [address]  [place] [address]    Primary Care Provider: No Ref-Primary, Physician    CC: UTIs    HPI:  Mechelle Mata is a 30 year old female who presents for evaluation of her pelvic floor symptoms.      Here today for frequent uti's "  Three in last year, March then a month later April then two weeks later another- ua positive . During pregnancy had a UTI.  Delivered after had mastitis and had another UTI.  Then another one about a month later.      Prior to this UTIs once a year, often related to camping or being in a swimsuit all day    Extreme urgency, dysuria, urgency frequency, bladder pain.  She denies gross hematuria, febrile UTI, kidney stones, history of pyelonephritis.    1 c section.    She was also have a lot of c section scar pain and so she started seeing Melody at [facility].  She started this about a couple months ago.  Urinary urgency frequency has much improved.    Past Medical History:   Diagnosis Date     Depressive disorder      Thyroid disease      Uncomplicated asthma        Past Surgical History:   Procedure Laterality Date     C EXCISION OF GANGLION CYST FOREARM Left 2006    wrist      SECTION N/A 2019    Procedure:  SECTION;  Surgeon: Zuri Cisneros MD;  [facility]: [facility]     wisdom teeth         Social History     Socioeconomic History     Marital status:      Spouse name: Not on file     Number of children: Not on file     Years of education: Not on file     Highest education level: Not on file   Occupational History     Not on file   Social Needs     Financial resource strain: Not on file     Food insecurity     Worry: Not on file     Inability: Not on file     Transportation needs     Medical: Not on file     Non-medical: Not on file   Tobacco Use     Smoking status: Never Smoker     Smokeless tobacco: Never Used   Substance and Sexual Activity     Alcohol use: Yes     Frequency: 2-4 times a month     Drinks per session: 1 or 2     Binge frequency: Never     Drug use: Never     Sexual activity: Yes     Partners: Male     Birth control/protection: Pill   Lifestyle     Physical activity     Days per week: Not on file     Minutes per session: Not on file     Stress: Not on file    Relationships     Social connections     Talks on phone: Not on file     Gets together: Not on file     Attends Jain service: Not on file     Active member of club or organization: Not on file     Attends meetings of clubs or organizations: Not on file     Relationship status: Not on file     Intimate partner violence     Fear of current or ex partner: Not on file     Emotionally abused: Not on file     Physically abused: Not on file     Forced sexual activity: Not on file   Other Topics Concern     Not on file   Social History Narrative     Not on file       Family History   Problem Relation Age of Onset     Mental Illness Mother      Depression Mother      Mental Illness Father      Depression Father      Heart Failure Paternal Grandfather        ROS    No Known Allergies    Current Outpatient Medications   Medication     albuterol (PROVENTIL HFA) 108 (90 Base) MCG/ACT inhaler     budesonide-formoterol (SYMBICORT) 80-4.5 MCG/ACT Inhaler     clindamycin (CLINDAMAX) 1 % external gel     levothyroxine (SYNTHROID/LEVOTHROID) 50 MCG tablet     norethindrone (MICRONOR) 0.35 MG tablet     Prenatal Vit-Fe Fumarate-FA (PRENATAL PO)     Vitamin D, Cholecalciferol, 1000 units CAPS     WELLBUTRIN  MG 24 hr tablet     No current facility-administered medications for this visit.      GENERAL: healthy, alert and no distress  EYES: Eyes grossly normal to inspection, conjunctivae and sclerae normal  HENT: normal cephalic/atraumatic.  External ears, nose and mouth without ulcers or lesions.  RESP: no audible wheeze, cough, or visible cyanosis.  No visible retractions or increased work of breathing.  Able to speak fully in complete sentences.  NEURO: Cranial nerves grossly intact, mentation intact and speech normal  PSYCH: mentation appears normal, affect normal/bright, judgement and insight intact, normal speech and appearance well-groomed    A/P: Mechelle Mata is a 30 year old F with history of UTIs, constipation, c  section, urinary urgency frequency, dyspareunia, pelvic floor dysfunction    Continue pelvic floor therapy with Melody    Daily fiber supplement for the constipation    We discussed supplements to prevent UTI as to avoid abx    She is asked to contact us if she thinks she has a UTI so we can monitor    Follow up in about 6 months, sooner if needed    29 minutes were spent with the patient today, > 50% in counseling and coordination of care    Chayito Bates MD MPH    Urology    CC  Patient Care Team:  No Ref-Primary, Physician as PCP - General  Gauri Quick, APRN CNP as Assigned PCP  Chayito Bates MD as MD (Urology)  GAURI QUICK

## 2020-08-05 NOTE — LETTER
"8/5/2020       RE: Mechelle Mata  [address] [address]     Dear Colleague,    Thank you for referring your patient, Mechelle Mata, to the [facility]. Please see a copy of my visit note below.    August 5, 2020    Virtual visit with urology today- patient will be expecting contact by a text on her iphone- she knows she will be charged for a visit.  Allergies meds reviewed.    Mechelle Mata is a 30 year old female who is being evaluated via a billable video visit.      The patient has been notified of following:     \"This video visit will be conducted via a call between you and your physician/provider. We have found that certain health care needs can be provided without the need for an in-person physical exam.  This service lets us provide the care you need with a video conversation.  If a prescription is necessary we can send it directly to your pharmacy.  If lab work is needed we can place an order for that and you can then stop by our lab to have the test done at a later time.    Video visits are billed at different rates depending on your insurance coverage.  Please reach out to your insurance provider with any questions.    If during the course of the call the physician/provider feels a video visit is not appropriate, you will not be charged for this service.\"    Patient has given verbal consent for Video visit? Yes  How would you like to obtain your AVS? MyChart  If you are dropped from the video visit, the video invite should be resent to: Text to cell phone: [phone]  Will anyone else be joining your video visit? No        Video-Visit Details    Type of service:  Video Visit    Video Start Time: 0912  Video End Time: 0941    Originating Location (pt. Location): Home    Distant Location (provider location):  [facility]     Platform used for Video Visit: Timothy did not work   "   Referring Provider: Gauri Brice, APRN CNP  [address]  [place] [address]    Primary Care Provider: No Ref-Primary, Physician    CC: UTIs    HPI:  Mechelle Mata is a 30 year old female who presents for evaluation of her pelvic floor symptoms.      Here today for frequent uti's  Three in last year, March then a month later April then two weeks later another- ua positive . During pregnancy had a UTI.  Delivered after had mastitis and had another UTI.  Then another one about a month later.      Prior to this UTIs once a year, often related to camping or being in a swimsuit all day    Extreme urgency, dysuria, urgency frequency, bladder pain.  She denies gross hematuria, febrile UTI, kidney stones, history of pyelonephritis.    1 c section.    She was also have a lot of c section scar pain and so she started seeing Melody at [facility].  She started this about a couple months ago.  Urinary urgency frequency has much improved.    Past Medical History:   Diagnosis Date     Depressive disorder      Thyroid disease      Uncomplicated asthma        Past Surgical History:   Procedure Laterality Date     C EXCISION OF GANGLION CYST FOREARM Left 2006    wrist      SECTION N/A 2019    Procedure:  SECTION;  Surgeon: Zuri Cisneros MD;  [facility]     wisdom teeth         Social History     Socioeconomic History     Marital status:      Spouse name: Not on file     Number of children: Not on file     Years of education: Not on file     Highest education level: Not on file   Occupational History     Not on file   Social Needs     Financial resource strain: Not on file     Food insecurity     Worry: Not on file     Inability: Not on file     Transportation needs     Medical: Not on file     Non-medical: Not on file   Tobacco Use     Smoking status: Never Smoker     Smokeless tobacco: Never Used   Substance and Sexual Activity     Alcohol use: Yes     Frequency: 2-4 times a  month     Drinks per session: 1 or 2     Binge frequency: Never     Drug use: Never     Sexual activity: Yes     Partners: Male     Birth control/protection: Pill   Lifestyle     Physical activity     Days per week: Not on file     Minutes per session: Not on file     Stress: Not on file   Relationships     Social connections     Talks on phone: Not on file     Gets together: Not on file     Attends Yazidi service: Not on file     Active member of club or organization: Not on file     Attends meetings of clubs or organizations: Not on file     Relationship status: Not on file     Intimate partner violence     Fear of current or ex partner: Not on file     Emotionally abused: Not on file     Physically abused: Not on file     Forced sexual activity: Not on file   Other Topics Concern     Not on file   Social History Narrative     Not on file       Family History   Problem Relation Age of Onset     Mental Illness Mother      Depression Mother      Mental Illness Father      Depression Father      Heart Failure Paternal Grandfather        ROS    No Known Allergies    Current Outpatient Medications   Medication     albuterol (PROVENTIL HFA) 108 (90 Base) MCG/ACT inhaler     budesonide-formoterol (SYMBICORT) 80-4.5 MCG/ACT Inhaler     clindamycin (CLINDAMAX) 1 % external gel     levothyroxine (SYNTHROID/LEVOTHROID) 50 MCG tablet     norethindrone (MICRONOR) 0.35 MG tablet     Prenatal Vit-Fe Fumarate-FA (PRENATAL PO)     Vitamin D, Cholecalciferol, 1000 units CAPS     WELLBUTRIN  MG 24 hr tablet     No current facility-administered medications for this visit.      GENERAL: healthy, alert and no distress  EYES: Eyes grossly normal to inspection, conjunctivae and sclerae normal  HENT: normal cephalic/atraumatic.  External ears, nose and mouth without ulcers or lesions.  RESP: no audible wheeze, cough, or visible cyanosis.  No visible retractions or increased work of breathing.  Able to speak fully in complete  sentences.  NEURO: Cranial nerves grossly intact, mentation intact and speech normal  PSYCH: mentation appears normal, affect normal/bright, judgement and insight intact, normal speech and appearance well-groomed    A/P: Mechelle Mata is a 30 year old F with history of UTIs, constipation, c section, urinary urgency frequency, dyspareunia, pelvic floor dysfunction    Continue pelvic floor therapy with Melody    Daily fiber supplement for the constipation    We discussed supplements to prevent UTI as to avoid abx    She is asked to contact us if she thinks she has a UTI so we can monitor    Follow up in about 6 months, sooner if needed    29 minutes were spent with the patient today, > 50% in counseling and coordination of care    Chayito Bates MD MPH    Urology    CC  Patient Care Team:  No Ref-Primary, Physician as PCP - General  Gauri Quick, APRN CNP as Assigned PCP  Chayito Bates MD as MD (Urology)  GAURI QUICK

## 2020-08-06 DIAGNOSIS — N39.0 RECURRENT UTI: ICD-10-CM

## 2020-08-06 DIAGNOSIS — R82.90 NONSPECIFIC FINDING ON EXAMINATION OF URINE: Primary | ICD-10-CM

## 2020-08-06 LAB
ALBUMIN UR-MCNC: NEGATIVE MG/DL
APPEARANCE UR: CLEAR
BACTERIA #/AREA URNS HPF: ABNORMAL /HPF
BILIRUB UR QL STRIP: NEGATIVE
COLOR UR AUTO: YELLOW
GLUCOSE UR STRIP-MCNC: NEGATIVE MG/DL
HGB UR QL STRIP: ABNORMAL
KETONES UR STRIP-MCNC: NEGATIVE MG/DL
LEUKOCYTE ESTERASE UR QL STRIP: ABNORMAL
NITRATE UR QL: NEGATIVE
PH UR STRIP: 6 PH (ref 5–7)
RBC #/AREA URNS AUTO: ABNORMAL /HPF
SOURCE: ABNORMAL
SP GR UR STRIP: <=1.005 (ref 1–1.03)
UROBILINOGEN UR STRIP-ACNC: 0.2 EU/DL (ref 0.2–1)
WBC #/AREA URNS AUTO: ABNORMAL /HPF

## 2020-08-06 PROCEDURE — 81001 URINALYSIS AUTO W/SCOPE: CPT | Performed by: NURSE PRACTITIONER

## 2020-08-06 PROCEDURE — 87088 URINE BACTERIA CULTURE: CPT | Performed by: NURSE PRACTITIONER

## 2020-08-06 PROCEDURE — 87086 URINE CULTURE/COLONY COUNT: CPT | Performed by: NURSE PRACTITIONER

## 2020-08-06 PROCEDURE — 87186 SC STD MICRODIL/AGAR DIL: CPT | Performed by: NURSE PRACTITIONER

## 2020-08-07 ENCOUNTER — PATIENT OUTREACH (OUTPATIENT)
Dept: UROLOGY | Facility: CLINIC | Age: 30
End: 2020-08-07

## 2020-08-07 DIAGNOSIS — N39.0 URINARY TRACT INFECTION: Primary | ICD-10-CM

## 2020-08-07 RX ORDER — CEPHALEXIN 500 MG/1
500 CAPSULE ORAL 2 TIMES DAILY
Qty: 4 CAPSULE | Refills: 0 | Status: SHIPPED | OUTPATIENT
Start: 2020-08-07 | End: 2020-08-09

## 2020-08-07 NOTE — TELEPHONE ENCOUNTER
Patient calling to says she has symptoms of an UTI. She had urinary frequency, urinary burning and blood in urine. Patient's care plan is she is to start keflex at the first sign of UTI symptoms call to schedule urine culture. Once culture is done, she started her keflex antibiotic. She is breastfeeding currently. Urinalysis done with culture still pending. Patient would like mentioned that her UTI's are triggered by intercourse 1-2 days after. She forgot to tell you about it.  She will need a refill sent to [facility] pharmacy in [place]. Will update Dr. Paulo Brice, RN   Care Coordinator Urology

## 2020-08-08 LAB
BACTERIA SPEC CULT: ABNORMAL
Lab: ABNORMAL
SPECIMEN SOURCE: ABNORMAL

## 2020-08-11 DIAGNOSIS — N39.0 URINARY TRACT INFECTION: Primary | ICD-10-CM

## 2020-08-11 RX ORDER — AMOXICILLIN 250 MG/1
250 CAPSULE ORAL
Qty: 30 CAPSULE | Refills: 0 | Status: SHIPPED | OUTPATIENT
Start: 2020-08-11 | End: 2021-03-16

## 2020-08-12 PROBLEM — K59.00 CONSTIPATION, UNSPECIFIED CONSTIPATION TYPE: Status: ACTIVE | Noted: 2020-08-12

## 2020-08-12 PROBLEM — Z87.440 PERSONAL HISTORY OF URINARY TRACT INFECTION: Status: ACTIVE | Noted: 2020-08-12

## 2020-08-13 ENCOUNTER — PATIENT OUTREACH (OUTPATIENT)
Dept: UROLOGY | Facility: CLINIC | Age: 30
End: 2020-08-13

## 2020-08-13 DIAGNOSIS — N39.0 URINARY TRACT INFECTION: Primary | ICD-10-CM

## 2020-08-13 NOTE — TELEPHONE ENCOUNTER
Plan: Patient to start on amoxicillin 250 mg take post coital. If patient develops urinary symptoms she is to schedule an appointment for the lab to bring in an urine sample. She will then start amoxicillin 500 mg po twice a day from her medications for 3 days and call clinic to report her symptoms. Patient has an order in the system currently.   Patient was updated.    Flower Brice RN   Care Coordinator Urology

## 2020-08-14 ENCOUNTER — THERAPY VISIT (OUTPATIENT)
Dept: PHYSICAL THERAPY | Facility: CLINIC | Age: 30
End: 2020-08-14
Payer: COMMERCIAL

## 2020-08-14 DIAGNOSIS — M62.89 PELVIC FLOOR DYSFUNCTION: ICD-10-CM

## 2020-08-14 DIAGNOSIS — R35.0 URINARY FREQUENCY: ICD-10-CM

## 2020-08-14 DIAGNOSIS — O34.219 MATERNAL CARE FOR UTERINE SCAR DUE TO PREVIOUS CESAREAN SECTION, DELIVERED: ICD-10-CM

## 2020-08-14 DIAGNOSIS — R39.15 URGENCY OF URINATION: ICD-10-CM

## 2020-08-14 DIAGNOSIS — N94.10 DYSPAREUNIA, FEMALE: ICD-10-CM

## 2020-08-14 PROCEDURE — 97110 THERAPEUTIC EXERCISES: CPT | Mod: GP | Performed by: PHYSICAL THERAPIST

## 2020-08-14 PROCEDURE — 97112 NEUROMUSCULAR REEDUCATION: CPT | Mod: GP | Performed by: PHYSICAL THERAPIST

## 2020-08-14 NOTE — PROGRESS NOTES
Subjective:  HPI  Physical Exam                    Objective:  System    Physical Exam    General     ROS    Assessment/Plan:    DISCHARGE REPORT    Progress reporting period is from 7/3/20 to 8/14/20.       SUBJECTIVE  Subjective changes noted by patient:  Subjective: Mechelle is well, feeling better, No pain with intercourse mentioned. Void cycles every 3 -4 hrs mostly. no leaks    Current pain level is  Current Pain level: 0/10.     Previous pain level was 4-5/10    .   Changes in function:  Yes (See Goal flowsheet attached for changes in current functional level)  Adverse reaction to treatment or activity: None    OBJECTIVE  Changes noted in objective findings:  Yes,   Objective: Good flexibility demonstrated. No tenderenss in PFM upon palaption today. Good scar tissue mobility. Diastasis recti 1 finger breadth at umbilicus level. Laycock 3+/10/10/10. Taught urge supression technique to work on void cycles.      ASSESSMENT/PLAN  Updated problem list and treatment plan: Diagnosis 1:  PFD    STG/LTGs have been met or progress has been made towards goals:  Yes (See Goal flow sheet completed today.)  Assessment of Progress: The patient's condition is improving.  The patient's condition has potential to improve.  The patient has met all of their long term goals.  Self Management Plans:  Patient is independent in a home treatment program.  Patient is independent in self management of symptoms.  I have re-evaluated this patient and find that the nature, scope, duration and intensity of the therapy is appropriate for the medical condition of the patient.  Mechelle continues to require the following intervention to meet STG and LTG's:  PT intervention is no longer required to meet STG/LTG.    Recommendations:  This patient is ready to be discharged from therapy and continue their home treatment program.    Please refer to the daily flowsheet for treatment today, total treatment time and time spent performing 1:1 timed  codes.

## 2020-10-26 ENCOUNTER — MYC REFILL (OUTPATIENT)
Dept: FAMILY MEDICINE | Facility: CLINIC | Age: 30
End: 2020-10-26

## 2020-10-26 DIAGNOSIS — F33.1 MAJOR DEPRESSIVE DISORDER, RECURRENT EPISODE, MODERATE (H): ICD-10-CM

## 2020-10-26 RX ORDER — BUPROPION HCL 150 MG
150 TABLET, EXTENDED RELEASE 24 HR ORAL EVERY MORNING
Qty: 90 TABLET | Refills: 0 | Status: SHIPPED | OUTPATIENT
Start: 2020-10-26 | End: 2020-12-01

## 2020-10-26 NOTE — TELEPHONE ENCOUNTER
Sent Pt PHQ9 via App[employer] to complete.   Last PHQ9 12/2019, score 2.   Last OV 6/19/20     Pending Prescriptions:                       Disp   Refills    WELLBUTRIN  MG 24 hr tablet         90 tab*0            Sig: Take 1 tablet (150 mg) by mouth every morning    Prescription approved per [facility] Refill Protocol.       Yaya Lopez RN   [facility]

## 2020-12-01 ENCOUNTER — VIRTUAL VISIT (OUTPATIENT)
Dept: FAMILY MEDICINE | Facility: CLINIC | Age: 30
End: 2020-12-01
Payer: COMMERCIAL

## 2020-12-01 DIAGNOSIS — F33.1 MAJOR DEPRESSIVE DISORDER, RECURRENT EPISODE, MODERATE (H): ICD-10-CM

## 2020-12-01 DIAGNOSIS — E03.9 HYPOTHYROIDISM, UNSPECIFIED TYPE: Primary | ICD-10-CM

## 2020-12-01 DIAGNOSIS — Z98.891 S/P CESAREAN SECTION: ICD-10-CM

## 2020-12-01 DIAGNOSIS — L70.9 ACNE, UNSPECIFIED ACNE TYPE: ICD-10-CM

## 2020-12-01 PROCEDURE — 99214 OFFICE O/P EST MOD 30 MIN: CPT | Mod: 95 | Performed by: NURSE PRACTITIONER

## 2020-12-01 RX ORDER — LEVOTHYROXINE SODIUM 50 UG/1
50 TABLET ORAL DAILY
Qty: 90 TABLET | Refills: 3 | Status: SHIPPED | OUTPATIENT
Start: 2020-12-01 | End: 2021-12-13

## 2020-12-01 RX ORDER — CLINDAMYCIN PHOSPHATE 10 MG/G
GEL TOPICAL 2 TIMES DAILY
Qty: 60 G | Refills: 3 | Status: SHIPPED | OUTPATIENT
Start: 2020-12-01 | End: 2021-11-17

## 2020-12-01 RX ORDER — BUPROPION HCL 150 MG
150 TABLET, EXTENDED RELEASE 24 HR ORAL EVERY MORNING
Qty: 90 TABLET | Refills: 3 | Status: SHIPPED | OUTPATIENT
Start: 2020-12-01 | End: 2021-02-04

## 2020-12-01 RX ORDER — ACETAMINOPHEN AND CODEINE PHOSPHATE 120; 12 MG/5ML; MG/5ML
0.35 SOLUTION ORAL DAILY
Qty: 84 TABLET | Refills: 4 | Status: SHIPPED | OUTPATIENT
Start: 2020-12-01 | End: 2021-03-05

## 2020-12-01 ASSESSMENT — ASTHMA QUESTIONNAIRES
QUESTION_1 LAST FOUR WEEKS HOW MUCH OF THE TIME DID YOUR ASTHMA KEEP YOU FROM GETTING AS MUCH DONE AT WORK, SCHOOL OR AT HOME: NONE OF THE TIME
QUESTION_4 LAST FOUR WEEKS HOW OFTEN HAVE YOU USED YOUR RESCUE INHALER OR NEBULIZER MEDICATION (SUCH AS ALBUTEROL): NOT AT ALL
QUESTION_5 LAST FOUR WEEKS HOW WOULD YOU RATE YOUR ASTHMA CONTROL: COMPLETELY CONTROLLED
QUESTION_2 LAST FOUR WEEKS HOW OFTEN HAVE YOU HAD SHORTNESS OF BREATH: NOT AT ALL
ACT_TOTALSCORE: 25
QUESTION_3 LAST FOUR WEEKS HOW OFTEN DID YOUR ASTHMA SYMPTOMS (WHEEZING, COUGHING, SHORTNESS OF BREATH, CHEST TIGHTNESS OR PAIN) WAKE YOU UP AT NIGHT OR EARLIER THAN USUAL IN THE MORNING: NOT AT ALL

## 2020-12-01 ASSESSMENT — PATIENT HEALTH QUESTIONNAIRE - PHQ9: SUM OF ALL RESPONSES TO PHQ QUESTIONS 1-9: 1

## 2020-12-01 NOTE — PATIENT INSTRUCTIONS
Pumping discontinuation  Drop pumping time in half and then then next week not all    Possible plugged duct  Dangle feeds or with her chin pointed toward the sore area  Heat before nursing or pumping  Restart lecithin consistently (especially with decreasing pumping)  If the pain hasn't changed in a month, we should consider imaging.  Women can get abscesses that don't respond to conservative measures and we find those with an ultrasound  If you have fever, chills, redness of the breast, please let me know right away so we can start antibiotics.    Nipple pain  Have a boundary with only offering one breast at a time  Do hand expression before your daughter nurses on each side to get the let-down started/closer  Offer ibuprofen if your daughter seems particularly uncomfortable  Nurse in a low-stimulation area (lights low, quiet, very few distractions)    Nursing in general  I sent a message with the advice from the other lactation consultation.  She recommends two books as ones with good information for nursing older children    Annual exam and thyroid labs  Wait until [place] numbers go down - maybe Feb or March  Get a pneumococcal vaccine (due to asthma) - can wait until physical

## 2020-12-01 NOTE — Clinical Note
Pedro Roblero.  I realized that, with you here, I actually have someone to bounce ideas off.  Wondering if you have any thoughts for this patient.  Her daughter just turned [age] and patient has been having sore nipples for the past 1-2 weeks.  The infant behavior that has changed is that she pops off more often, has a shallower than normal latch and wants to move between breasts frequently.  I suspect that the changes are because the infant is getting molars (latch improves when she has had ibuprofen), but not sure how to help mom.  She already feeds the infant on her lap facing her.  I instructed her to not allow infant to go back and forth right now.  She also thought the pain improved after let-down so I suggested trying to manually stimulate let down before infant latching.  Any other thoughts?  I would so love to have someone else's input.  Thanks!  Brandi

## 2020-12-01 NOTE — PROGRESS NOTES
"Mechelle Mata is a 30 year old female who is being evaluated via a billable video visit.      The patient has been notified of following:     \"This video visit will be conducted via a call between you and your physician/provider. We have found that certain health care needs can be provided without the need for an in-person physical exam.  This service lets us provide the care you need with a video conversation.  If a prescription is necessary we can send it directly to your pharmacy.  If lab work is needed we can place an order for that and you can then stop by our lab to have the test done at a later time.    Video visits are billed at different rates depending on your insurance coverage.  Please reach out to your insurance provider with any questions.    If during the course of the call the physician/provider feels a video visit is not appropriate, you will not be charged for this service.\"    Patient has given verbal consent for Video visit? Yes  How would you like to obtain your AVS? MyChart  If you are dropped from the video visit, the video invite should be resent to: Text to cell phone: [phone]   Will anyone else be joining your video visit? No    Subjective     Mechelle Mata is a 30 year old female who presents today via video visit for the following health issues:    HPI      Daughter turned one recently.  Breastfeeding questions - both nipples are starting to hurt the last couple days.  Nursed more often during Thanksgiving break.  Infant has never latched deeply, but more shallow and she has also been pulling more.  Getting molars for the past 2-3 weeks.  She also likes to move back and forth from one breast to the other quickly.  No nipple skin changes.  Infant doesn't have white patches in her mouth.  Not having periods - on minipill and takes it every day.  Negative home UPT (does this monthly).  Goal to stop pumping and continue nursing.  Pumped once for 14 minutes today and got 4 oz.  Working at " home and pumping only on lunch break.  Sore area of the right side near, but not on, the sternum - seems to be getting better.  Plans to restart lecithin consistently.    UTIs - hasn't gotten any since she takes amoxicillin or cranberry after sex.  No longer having urinary frequency.  Vaginal discharge has changed a couple months ago.  Had a resurgence  pain after she cut back on massage.    Flu shot at work 2020    Hypothyroidism Follow-up      Since last visit, patient describes the following symptoms: hair loss    Component      Latest Ref Rng & Units 2019 2019 2019 1/15/2020   TSH      0.40 - 4.00 mU/L 2.28 1.66 3.10 1.42   T4 Free      0.76 - 1.46 ng/dL 0.91   0.92       Depression Followup    How are you doing with your depression since your last visit? No change    Are you having other symptoms that might be associated with depression? No    Have you had a significant life event?  No     Are you feeling anxious or having panic attacks?   No    Do you have any concerns with your use of alcohol or other drugs? No    Suicide Assessment Five-step Evaluation and Treatment (SAFE-T)    Asthma Follow-Up    Was ACT completed today?    Yes    ACT Total Scores 2020   ACT TOTAL SCORE (Goal Greater than or Equal to 20) 25   In the past 12 months, how many times did you visit the emergency room for your asthma without being admitted to the hospital? 0   In the past 12 months, how many times were you hospitalized overnight because of your asthma? 0          How many days per week do you miss taking your asthma controller medication?  I do not have an asthma controller medication    Please describe any recent triggers for your asthma: upper respiratory infections and exercise or sports    Have you had any Emergency Room Visits, Urgent Care Visits, or Hospital Admissions since your last office visit?  No      How many servings of fruits and vegetables do you eat daily?  2-3    On average, how  many sweetened beverages do you drink each day (Examples: soda, juice, sweet tea, etc.  Do NOT count diet or artificially sweetened beverages)?   0    How many days per week do you exercise enough to make your heart beat faster? 3 or less    How many minutes a day do you exercise enough to make your heart beat faster? 9 or less    How many days per week do you miss taking your medication? 0    Social History     Tobacco Use     Smoking status: Never Smoker     Smokeless tobacco: Never Used   Substance Use Topics     Alcohol use: Yes     Frequency: 2-4 times a month     Drinks per session: 1 or 2     Binge frequency: Never     Drug use: Never     PHQ 12/2/2019 12/30/2019 10/26/2020   PHQ-9 Total Score 0 2 1   Q9: Thoughts of better off dead/self-harm past 2 weeks Not at all Not at all Not at all     LUCIO-7 SCORE 12/2/2019 12/30/2019   Total Score 0 0     Last PHQ-9 12/1/2020   1.  Little interest or pleasure in doing things 0   2.  Feeling down, depressed, or hopeless 0   3.  Trouble falling or staying asleep, or sleeping too much 1   4.  Feeling tired or having little energy 0   5.  Poor appetite or overeating 0   6.  Feeling bad about yourself 0   7.  Trouble concentrating 0   8.  Moving slowly or restless 0   Q9: Thoughts of better off dead/self-harm past 2 weeks 0   PHQ-9 Total Score 1   Difficulty at work, home, or with people Not difficult at all     LUCIO-7  12/30/2019   1. Feeling nervous, anxious, or on edge 0   2. Not being able to stop or control worrying 0   3. Worrying too much about different things 0   4. Trouble relaxing 0   5. Being so restless that it is hard to sit still 0   6. Becoming easily annoyed or irritable 0   7. Feeling afraid, as if something awful might happen 0   LUCIO-7 Total Score 0   If you checked any problems, how difficult have they made it for you to do your work, take care of things at home, or get along with other people? Not difficult at all       Video Start Time: 5:39 PM    I  have reviewed and updated the patient's Past Medical History, Social History, Family History and Medication List      Review of Systems   Constitutional, HEENT, cardiovascular, pulmonary, gi and gu systems are negative, except as otherwise noted.      Objective           Vitals:  No vitals were obtained today due to virtual visit.    Physical Exam     GENERAL: Healthy, alert and no distress  EYES: Eyes grossly normal to inspection.  No discharge or erythema, or obvious scleral/conjunctival abnormalities.  RESP: No audible wheeze, cough, or visible cyanosis.  No visible retractions or increased work of breathing.    SKIN: Visible skin clear. No significant rash, abnormal pigmentation or lesions.  NEURO: Cranial nerves grossly intact.  Mentation and speech appropriate for age.  PSYCH: Mentation appears normal, affect normal/bright, judgement and insight intact, normal speech and appearance well-groomed.            Assessment & Plan     (E03.9) Hypothyroidism, unspecified type  (primary encounter diagnosis)  Comment:   Plan: TSH with free T4 reflex, levothyroxine         (SYNTHROID/LEVOTHROID) 50 MCG tablet            (F33.1) Major depressive disorder, recurrent episode, moderate (H)  Comment:   Plan: WELLBUTRIN  MG 24 hr tablet            (Z98.891) S/P  section  Comment:   Plan: norethindrone (MICRONOR) 0.35 MG tablet            (L70.9) Acne, unspecified acne type  Comment:   Plan: clindamycin (CLINDAMAX) 1 % external gel          Breastfeeding - see patient instructions                Patient Instructions   Pumping discontinuation  Drop pumping time in half and then then next week not all    Possible plugged duct  Dangle feeds or with her chin pointed toward the sore area  Heat before nursing or pumping  Restart lecithin consistently (especially with decreasing pumping)  If the pain hasn't changed in a month, we should consider imaging.  Women can get abscesses that don't respond to conservative  measures and we find those with an ultrasound  If you have fever, chills, redness of the breast, please let me know right away so we can start antibiotics.    Nipple pain  Have a boundary with only offering one breast at a time  Do hand expression before your daughter nurses on each side to get the let-down started/closer  Offer ibuprofen if your daughter seems particularly uncomfortable  Nurse in a low-stimulation area (lights low, quiet, very few distractions)    Nursing in general  I sent a message with the advice from the other lactation consultation.  She recommends two books as ones with good information for nursing older children    Annual exam and thyroid labs  Wait until [place] numbers go down - maybe Feb or March  Get a pneumococcal vaccine (due to asthma) - can wait until physical      No follow-ups on file.    ISAIAS Larios CNP  [facility]      Video-Visit Details    Type of service:  Video Visit    Video End Time:6:16 PM    Originating Location (pt. Location): Home    Distant Location (provider location):  [facility]     Platform used for Video Visit: Evelin

## 2020-12-02 ENCOUNTER — MYC MEDICAL ADVICE (OUTPATIENT)
Dept: FAMILY MEDICINE | Facility: CLINIC | Age: 30
End: 2020-12-02

## 2020-12-02 ASSESSMENT — ASTHMA QUESTIONNAIRES: ACT_TOTALSCORE: 25

## 2020-12-09 ENCOUNTER — MYC MEDICAL ADVICE (OUTPATIENT)
Dept: FAMILY MEDICINE | Facility: CLINIC | Age: 30
End: 2020-12-09

## 2020-12-31 ENCOUNTER — THERAPY VISIT (OUTPATIENT)
Dept: PHYSICAL THERAPY | Facility: CLINIC | Age: 30
End: 2020-12-31
Payer: COMMERCIAL

## 2020-12-31 DIAGNOSIS — Z98.891 S/P CESAREAN SECTION: Primary | ICD-10-CM

## 2020-12-31 PROCEDURE — 97112 NEUROMUSCULAR REEDUCATION: CPT | Mod: GP | Performed by: PHYSICAL THERAPIST

## 2020-12-31 PROCEDURE — 97164 PT RE-EVAL EST PLAN CARE: CPT | Mod: GP | Performed by: PHYSICAL THERAPIST

## 2020-12-31 PROCEDURE — 97140 MANUAL THERAPY 1/> REGIONS: CPT | Mod: GP | Performed by: PHYSICAL THERAPIST

## 2020-12-31 NOTE — PROGRESS NOTES
[facility] Initial Evaluation  Subjective:    Therapist Generated HPI Evaluation  Problem details: MD order 20.    Mechelle mentions the pain in her lower abdomen returned 2020. She performed the scar tissue massage which was taught to her earlier for 1-2 weeks and the pain got better. She also got her 1st period after delivery mid Nov. She started working out since last week- 3 days/ week- push ups and weight lifting and has noticed some increased pain in her lower abdomen since then. She is not sure if the pain is from the scar tissue or adhesions, so has returned to therapy for pain management. She mentions of no UTI and pain free intercourse. .         Type of problem:  Pelvic dysfunction.    This is a new condition.  Occurance: unknown.    Patient reports pain:  Lumbar spine left, lumbar spine right and lower lumbar spine.  Pain is described as aching and is intermittent.  Pain timing: unsure when the pain returns.  Since onset symptoms are gradually improving.         Barriers include:  None as reported by patient.    Patient Health History  Mechelle Mata being seen for lower abdomen pain.     Date of Onset: recent flare up mid 2020.   Problem occurred:     Pain is reported as 4/10 on pain scale.  General health as reported by patient is good.                                                         Objective:  System         Lumbar/SI Evaluation  ROM:  AROM Lumbar: normal    Strength: Poor core stabilization demonstrated. Abd MMT 2/5                                            Pelvic Dysfunction Evaluation:          Abdominal Wall:      Trigger Points:  Internal obliques, external obliques and transverse abdominals  Scar Mobility:  Tenderenss over the c- section scar tissue                                 General     ROS    Assessment/Plan:    Patient is a 30 year old female with pelvic complaints.    Patient has the following significant findings with corresponding  treatment plan.                Diagnosis 1:  PFD-Lower abdomen pain  Pain -  hot/cold therapy, manual therapy, STS, self management, education and home program  Decreased strength - therapeutic exercise, therapeutic activities and home program  Decreased function - therapeutic activities and home program    Therapy Evaluation Codes:   1) History comprised of:   Personal factors that impact the plan of care:      Time since onset of symptoms.    Comorbidity factors that impact the plan of care are:      check previous HPI.     Medications impacting care: check previous HPI.  2) Examination of Body Systems comprised of:   Body structures and functions that impact the plan of care:      Pelvis.   Activity limitations that impact the plan of care are:      Lifting and Squatting/kneeling.  3) Clinical presentation characteristics are:   Stable/Uncomplicated.  4) Decision-Making    Low complexity using standardized patient assessment instrument and/or measureable assessment of functional outcome.  Cumulative Therapy Evaluation is: Low complexity.    Previous and current functional limitations:  (See Goal Flow Sheet for this information)    Short term and Long term goals: (See Goal Flow Sheet for this information)     Communication ability:  Patient appears to be able to clearly communicate and understand verbal and written communication and follow directions correctly.  Treatment Explanation - The following has been discussed with the patient:   RX ordered/plan of care  Anticipated outcomes  Possible risks and side effects  This patient would benefit from PT intervention to resume normal activities.   Rehab potential is good.    Frequency:  1 X week, once daily  Duration:  for 4 weeks  Discharge Plan:  Achieve all LTG.  Independent in home treatment program.  Reach maximal therapeutic benefit.    Please refer to the daily flowsheet for treatment today, total treatment time and time spent performing 1:1 timed codes.

## 2021-01-03 ENCOUNTER — HEALTH MAINTENANCE LETTER (OUTPATIENT)
Age: 31
End: 2021-01-03

## 2021-01-06 ENCOUNTER — MYC MEDICAL ADVICE (OUTPATIENT)
Dept: FAMILY MEDICINE | Facility: CLINIC | Age: 31
End: 2021-01-06

## 2021-01-06 NOTE — TELEPHONE ENCOUNTER
Brandi,    See patients my chart message    Thanks  Josy Luis, RN   [facility]

## 2021-01-11 ENCOUNTER — THERAPY VISIT (OUTPATIENT)
Dept: PHYSICAL THERAPY | Facility: CLINIC | Age: 31
End: 2021-01-11
Payer: COMMERCIAL

## 2021-01-11 DIAGNOSIS — Z98.891 S/P CESAREAN SECTION: Primary | ICD-10-CM

## 2021-01-11 PROCEDURE — 97112 NEUROMUSCULAR REEDUCATION: CPT | Mod: GP | Performed by: PHYSICAL THERAPIST

## 2021-01-11 PROCEDURE — 97140 MANUAL THERAPY 1/> REGIONS: CPT | Mod: GP | Performed by: PHYSICAL THERAPIST

## 2021-01-15 ENCOUNTER — TELEPHONE (OUTPATIENT)
Dept: FAMILY MEDICINE | Facility: CLINIC | Age: 31
End: 2021-01-15

## 2021-01-15 DIAGNOSIS — F33.1 MAJOR DEPRESSIVE DISORDER, RECURRENT EPISODE, MODERATE (H): Primary | ICD-10-CM

## 2021-01-15 NOTE — TELEPHONE ENCOUNTER
Prior Authorization Retail Medication Request    Medication/Dose: WELLBUTRIN  MG 24 hr tablet  ICD code (if different than what is on RX):    Previously Tried and Failed:    Rationale:      Insurance [facility]:  [facility]  Insurance ID:  [id number]      Pharmacy Information (if different than what is on RX)  Name:    Phone:

## 2021-01-18 ENCOUNTER — THERAPY VISIT (OUTPATIENT)
Dept: PHYSICAL THERAPY | Facility: CLINIC | Age: 31
End: 2021-01-18
Payer: COMMERCIAL

## 2021-01-18 DIAGNOSIS — Z98.891 S/P CESAREAN SECTION: Primary | ICD-10-CM

## 2021-01-18 PROCEDURE — 97112 NEUROMUSCULAR REEDUCATION: CPT | Mod: GP | Performed by: PHYSICAL THERAPIST

## 2021-01-18 PROCEDURE — 97110 THERAPEUTIC EXERCISES: CPT | Mod: GP | Performed by: PHYSICAL THERAPIST

## 2021-01-18 PROCEDURE — 97140 MANUAL THERAPY 1/> REGIONS: CPT | Mod: GP | Performed by: PHYSICAL THERAPIST

## 2021-01-19 NOTE — TELEPHONE ENCOUNTER
PA Initiation    Medication: WELLBUTRIN  MG 24 hr tablet - INITIATED  Insurance Company: [employer] - Phone [phone] Fax [phone]  Pharmacy Filling the Rx: [id number] - [place], [place] - [address]  Filling Pharmacy Phone: [phone]  Start Date: 1/19/2021

## 2021-01-19 NOTE — TELEPHONE ENCOUNTER
PRIOR AUTHORIZATION DENIED    Medication: WELLBUTRIN  MG 24 hr tablet - DENIED    Denial Date: 1/19/2021    Denial Rational:     Appeal Information:

## 2021-01-24 ENCOUNTER — MYC MEDICAL ADVICE (OUTPATIENT)
Dept: FAMILY MEDICINE | Facility: CLINIC | Age: 31
End: 2021-01-24

## 2021-01-24 DIAGNOSIS — F33.1 MAJOR DEPRESSIVE DISORDER, RECURRENT EPISODE, MODERATE (H): ICD-10-CM

## 2021-01-26 NOTE — TELEPHONE ENCOUNTER
PA has already been denied- please see telephone encounter from 1/15/21. If provider would like to appeal this decision please use that encounter.

## 2021-01-27 NOTE — TELEPHONE ENCOUNTER
"Please do an appeal on the basis of below:    \"I need Wellbutrin brand name because the generic gives me horrible nightmares where I'm scared to go to sleep at night. In terms of other psych meds I've tried- lexapro, cymbalta, effexor, celexa, and lamotrigine. Wellbutrin has been the only one that has stabilized the depression.\"    Dennise - any ideas for pharmacy cards/discounts?    KARLY Child   "

## 2021-01-27 NOTE — TELEPHONE ENCOUNTER
Coupon -   [email or web address]    Online estimates for pricing at ~$65/pill or close to $2000/month. Even with coupons taking off several hundred dollars, would still be extremely expensive to try to pay for this without insurance.     It appears her insurance will only cover the brand in situations where the patient is allergic to an inactive ingredient in the generic. I don't think an appeal will be approved in her situation.    Other options  - trial bupropion SR 150mg once a day (maybe the slightly shorter acting formulation would provide efficacy but lower blood levels at night and fewer nightmares.)  - trial of immediate release bupropion 75mg BID, same reasoning as above but taking advantage of different kinetics (AM and 2nd dose mid-day, skip PM dose - usually dosed TID)    FYI:  Peak blood levels bupropion  XL 5 hours  SR 3 hours  IR 2 hours    Peak blood levels hydroxybupropion (main active metabolite)  XL 7 hours  SR 6 hours  IR 3 hours    There are 3 active metabolites of bupropion that work at 30-50% efficacy of bupropion     Dennise Sawyer, PharmD, BCACP

## 2021-01-27 NOTE — TELEPHONE ENCOUNTER
Asked for appeal in the 1/15 encounter.  This is my first notification of the situation.  KARLY Child

## 2021-01-29 RX ORDER — BUPROPION HYDROCHLORIDE 150 MG/1
150 TABLET ORAL EVERY MORNING
Qty: 15 TABLET | Refills: 0 | Status: SHIPPED | OUTPATIENT
Start: 2021-01-29 | End: 2021-03-05

## 2021-01-29 NOTE — TELEPHONE ENCOUNTER
Route to provider  POD    See message  generic cued    Jazmyn Connor, CELE   [facility]

## 2021-01-29 NOTE — TELEPHONE ENCOUNTER
"Reason for Call:  Medication or medication refill:    Do you use a [facility]?  Name of the pharmacy and phone number for the current request:  [id number] - [place], [place] - [address]    Name of the medication requested: Genetic version of this medication  WELLBUTRIN  MG 24 hr tablet.     Other request: Base on the previous message from the pharmacist the patient might not qualify for an appeal either and the patient is not able to pay for this medication out of pocket even with a coupon because according to the patient, \"it is not realistic for her to pay $65 to $100 per pill. Patient would like to get a one or two week supply of the genetic version of this medication sent to the pharmacy as soon as possible. Please call patient back for advice.     Patient also wanted to provide the phone number for the appeal department in case the provider can call and check the status of this request. Ph:[phone]    Can we leave a detailed message on this number? YES    Phone number patient can be reached at: Home number on file [phone] (home)    Best Time: Any     Call taken on 1/29/2021 at 10:46 AM by Daphney Fan      "

## 2021-02-03 NOTE — TELEPHONE ENCOUNTER
I'd like to have a quick phone visit with patient next week to see how things are going on the generic wellbutrin.  KARLY Child

## 2021-02-03 NOTE — TELEPHONE ENCOUNTER
Left detailed msg for that a generic Rx was sent to pharmacy due to the cost. Also schedule a phone visit next week with Elizabeth Lopez RN   [facility]

## 2021-02-04 RX ORDER — BUPROPION HCL 150 MG
150 TABLET, EXTENDED RELEASE 24 HR ORAL EVERY MORNING
Qty: 90 TABLET | Refills: 3 | Status: ON HOLD | OUTPATIENT
Start: 2021-02-04 | End: 2022-02-27

## 2021-02-04 NOTE — TELEPHONE ENCOUNTER
Brandi MONSALVE    Pt called with how it has been going on generic, see below  Called pt insurance and spoke with Raven OROZCO   Call from pt, she has been trying the generic wellbutrin the past 5 days and she is having very vivid dreams and it is causing her to not have good sleep     In terms of other psych meds I've tried- lexapro, cymbalta, effexor, celexa, and lamotrigine. Wellbutrin has been the only one that has stabilized the depression.    If pt paid OOP she can file a claim to get reimbursed    PA number approval, [id number], good 1/5/21 to 2/4/22  Refill for the brand name was sent to the pharmacy, pt notified auth was approved    Jazmyn Connor RN   [facility]

## 2021-03-03 NOTE — PROGRESS NOTES
Discharge Note    Progress reporting period is from last progress note on   to Jan 18, 2021.    Mechelle failed to follow up and current status is unknown.  Please see information below for last relevant information on current status.  Patient seen for 6 visits.    SUBJECTIVE  Subjective changes noted by patient:  Mechelle mentions some pain in the corners of her scar , but able to sleep and roll over in bed without pain.   .  Current pain level is 2/10.     Previous pain level was   .   Changes in function:  Yes (See Goal flowsheet attached for changes in current functional level)  Adverse reaction to treatment or activity: None    OBJECTIVE  Changes noted in objective findings: Improving scar tissue mobility demonstrated. Progressign with TA strengthening exercises     ASSESSMENT/PLAN  Diagnosis: PFD- Urgency, Frequency, Dyspareunia   Updated problem list and treatment plan:     STG/LTGs have been met or progress has been made towards goals:  Yes, please see goal flowsheet for most current information  Assessment of Progress: current status is unknown.    Last current status: Pt is progressing as expected   Self Management Plans:  HEP  I have re-evaluated this patient and find that the nature, scope, duration and intensity of the therapy is appropriate for the medical condition of the patient.  Mechelle continues to require the following intervention to meet STG and LTG's:  HEP.    Recommendations:  Discharge with current home program.  Patient to follow up with MD as needed.    Please refer to the daily flowsheet for treatment today, total treatment time and time spent performing 1:1 timed codes.

## 2021-03-05 ENCOUNTER — OFFICE VISIT (OUTPATIENT)
Dept: FAMILY MEDICINE | Facility: CLINIC | Age: 31
End: 2021-03-05
Payer: COMMERCIAL

## 2021-03-05 VITALS
HEIGHT: 67 IN | SYSTOLIC BLOOD PRESSURE: 122 MMHG | WEIGHT: 156 LBS | DIASTOLIC BLOOD PRESSURE: 58 MMHG | OXYGEN SATURATION: 97 % | HEART RATE: 93 BPM | BODY MASS INDEX: 24.48 KG/M2 | TEMPERATURE: 98.8 F

## 2021-03-05 DIAGNOSIS — Z00.00 ROUTINE GENERAL MEDICAL EXAMINATION AT A HEALTH CARE FACILITY: Primary | ICD-10-CM

## 2021-03-05 DIAGNOSIS — E03.9 HYPOTHYROIDISM, UNSPECIFIED TYPE: ICD-10-CM

## 2021-03-05 DIAGNOSIS — Z23 NEED FOR PNEUMOCOCCAL VACCINATION: ICD-10-CM

## 2021-03-05 DIAGNOSIS — Z12.4 SCREENING FOR CERVICAL CANCER: ICD-10-CM

## 2021-03-05 LAB — TSH SERPL DL<=0.005 MIU/L-ACNC: 2.17 MU/L (ref 0.4–4)

## 2021-03-05 PROCEDURE — 36415 COLL VENOUS BLD VENIPUNCTURE: CPT | Performed by: NURSE PRACTITIONER

## 2021-03-05 PROCEDURE — 99395 PREV VISIT EST AGE 18-39: CPT | Performed by: NURSE PRACTITIONER

## 2021-03-05 PROCEDURE — 84443 ASSAY THYROID STIM HORMONE: CPT | Performed by: NURSE PRACTITIONER

## 2021-03-05 PROCEDURE — 87624 HPV HI-RISK TYP POOLED RSLT: CPT | Performed by: NURSE PRACTITIONER

## 2021-03-05 ASSESSMENT — MIFFLIN-ST. JEOR: SCORE: 1460.36

## 2021-03-05 NOTE — PROGRESS NOTES
SUBJECTIVE:   CC: Mechelle Mata is an 30 year old woman who presents for preventive health visit.     Patient has been advised of split billing requirements and indicates understanding: Yes  Healthy Habits:    Do you get at least three servings of calcium containing foods daily (dairy, green leafy vegetables, etc.)? yes    Amount of exercise or daily activities, outside of work: 5 day(s) per week    Problems taking medications regularly No    Medication side effects: No    Have you had an eye exam in the past two years? no    Do you see a dentist twice per year? yes    Do you have sleep apnea, excessive snoring or daytime drowsiness?no    Bupropion caused nightmares, but was able to get PA and back on brand  Stopped birth control  Pelvic floor physical therapy helped a lot - still pain at C/S scar midcycle and with period.  Doing castor oil packs.    Today's PHQ-2 Score:   PHQ-2 ( 1999 [employer]) 3/5/2021 1/15/2020   Q1: Little interest or pleasure in doing things 0 0   Q2: Feeling down, depressed or hopeless 0 0   PHQ-2 Score 0 0   Q1: Little interest or pleasure in doing things - -   Q2: Feeling down, depressed or hopeless - -   PHQ-2 Score - -       Abuse: Current or Past(Physical, Sexual or Emotional)- No  Do you feel safe in your environment? Yes  Social History     Tobacco Use     Smoking status: Never Smoker     Smokeless tobacco: Never Used   Substance Use Topics     Alcohol use: Yes     Frequency: 2-4 times a month     Drinks per session: 1 or 2     Binge frequency: Never     If you drink alcohol do you typically have >3 drinks per day or >7 drinks per week? No                     Reviewed orders with patient.  Reviewed health maintenance and updated orders accordingly - Yes  Lab work is in process  Labs reviewed in [facility]    Breast CA Risk Screening:  No flowsheet data found.  No flowsheet data found.  click delete button to remove this line now  Patient under 40 years of age: Routine Mammogram Screening not  "recommended.   Pertinent mammograms are reviewed under the imaging tab.    Pertinent mammograms are reviewed under the imaging tab.  History of abnormal Pap smear: NO - age 30-65 PAP every 5 years with negative HPV co-testing recommended  PAP / HPV Latest Ref Rng & Units 3/8/2021 3/5/2021   PAP - NIL -   HPV 16 DNA NEG:Negative - Negative   HPV 18 DNA NEG:Negative - Negative   OTHER HR HPV NEG:Negative - Negative     Reviewed and updated as needed this visit by clinical staff  Tobacco  Allergies  Meds   Med Hx  Surg Hx  Fam Hx  Soc Hx        Reviewed and updated as needed this visit by Provider                    ROS:  CONSTITUTIONAL: NEGATIVE for fever, chills, change in weight  INTEGUMENTARU/SKIN: NEGATIVE for worrisome rashes, moles or lesions  EYES: NEGATIVE for vision changes or irritation  ENT: NEGATIVE for ear, mouth and throat problems  RESP: NEGATIVE for significant cough or SOB  BREAST: NEGATIVE for masses, tenderness or discharge  CV: NEGATIVE for chest pain, palpitations or peripheral edema  GI: NEGATIVE for nausea, abdominal pain, heartburn, or change in bowel habits  : NEGATIVE for unusual urinary or vaginal symptoms other than as noted above  MUSCULOSKELETAL: NEGATIVE for significant arthralgias or myalgia  NEURO: NEGATIVE for weakness, dizziness or paresthesias  PSYCHIATRIC: NEGATIVE for changes in mood or affect other than as noted above    OBJECTIVE:   /58   Pulse 93   Temp 98.8  F (37.1  C) (Temporal)   Ht 1.71 m (5' 7.32\")   Wt 70.8 kg (156 lb)   SpO2 97%   BMI 24.20 kg/m    EXAM:  GENERAL: healthy, alert and no distress  EYES: Eyes grossly normal to inspection, PERRL and conjunctivae and sclerae normal  HENT: ear canals and TM's normal, nose and mouth without ulcers or lesions  NECK: no adenopathy, no asymmetry, masses, or scars and thyroid normal to palpation  RESP: lungs clear to auscultation - no rales, rhonchi or wheezes  BREAST: normal without masses, tenderness or " nipple discharge and no palpable axillary masses or adenopathy  CV: regular rate and rhythm, normal S1 S2, no S3 or S4, no murmur, click or rub, no peripheral edema and peripheral pulses strong  ABDOMEN: soft, nontender, no hepatosplenomegaly, no masses and bowel sounds normal   (female): normal female external genitalia, normal urethral meatus, vaginal mucosa pink, moist, well rugated, and normal cervix/adnexa/uterus without masses or discharge  MS: no gross musculoskeletal defects noted, no edema  SKIN: no suspicious lesions or rashes  NEURO: Normal strength and tone, mentation intact and speech normal  PSYCH: mentation appears normal, affect normal/bright  LYMPH: no cervical, supraclavicular, axillary, or inguinal adenopathy    Diagnostic Test Results:  Labs reviewed in Epic  Results for orders placed or performed in visit on 03/05/21   **TSH with free T4 reflex FUTURE anytime     Status: None   Result Value Ref Range    TSH 2.17 0.40 - 4.00 mU/L   HPV High Risk Types DNA Cervical     Status: None   Result Value Ref Range    HPV Source SurePath     HPV 16 DNA Negative NEG^Negative    HPV 18 DNA Negative NEG^Negative    Other HR HPV Negative NEG^Negative    Final Diagnosis This patient's sample is negative for HPV DNA.     Specimen Description Cervical Cells    Pap imaged thin layer screen with HPV - recommended age 30 - 65     Status: None   Result Value Ref Range    PAP NIL     Copath Report         Patient Name: JAYDEN MERAZ  MR#: [id number]  Specimen #: [id number]  Collected: 3/8/2021  Received: 3/8/2021  Reported: 3/10/2021 10:15  Ordering Phy(s): FRANCINE QUICK    For improved result formatting, select 'View Enhanced Report Format' under   Linked Documents section.    SPECIMEN/STAIN PROCESS:  Pap imaged thin layer prep screening (Surepath, FocalPoint with guided   screening)       Pap-Cyto x 1, HPV ordered x 1    SOURCE: Cervical,  endocervical  ----------------------------------------------------------------   Pap imaged thin layer prep screening (Surepath, FocalPoint with guided   screening)  SPECIMEN ADEQUACY:  Satisfactory for evaluation.  -Transformation zone component absent.    CYTOLOGIC INTERPRETATION:    Negative for intraepithelial lesion or malignancy    Electronically signed out by:  NORMA Colon (ASCP)    CLINICAL HISTORY:    Papanicolaou Test Limitations:  Cervical cytology is a screening test with   limited sensitivity; regular  screening is critical for  cancer prevention; Pap tests are primarily   effective for the diagnosis/prevention of  squamous cell carcinoma, not adenocarcinomas or other cancers.    COLLECTION SITE:  Client:  [facility]  Location: [facility] (B)    The technical component of this testing was completed at the [facility], with the professional component performed   at the [facility], [address] [place] [id number] ([phone])            ASSESSMENT/PLAN:   (Z00.00) Routine general medical examination at a health care facility  (primary encounter diagnosis)  Comment:   Plan:     (Z12.4) Screening for cervical cancer  Comment:   Plan: Pap imaged thin layer screen with HPV -         recommended age 30 - 65, HPV High Risk Types         DNA Cervical            (Z23) Need for pneumococcal vaccination  Comment:   Plan: PPSV23, IM/SUBQ (2+ YRS) - [id number]            (E03.9) Hypothyroidism, unspecified type  Comment:   Plan: **TSH with free T4 reflex FUTURE anytime              Patient has been advised of split billing requirements and indicates understanding: Yes  COUNSELING:   Reviewed preventive health counseling, as reflected in patient instructions    Estimated body mass index is 24.2 kg/m  as calculated from the following:    Height as  "of this encounter: 1.71 m (5' 7.32\").    Weight as of this encounter: 70.8 kg (156 lb).    Weight management plan: Discussed healthy diet and exercise guidelines    She reports that she has never smoked. She has never used smokeless tobacco.      Counseling Resources:  ATP IV Guidelines  Pooled Cohorts Equation Calculator  Breast Cancer Risk Calculator  BRCA-Related Cancer Risk Assessment: FHS-7 Tool  FRAX Risk Assessment  ICSI Preventive Guidelines  Dietary Guidelines for Americans, 2010  USDA's MyPlate  ASA Prophylaxis  Lung CA Screening    ISAIAS Larios CNP  [facility]  "

## 2021-03-08 PROCEDURE — G0145 SCR C/V CYTO,THINLAYER,RESCR: HCPCS | Performed by: NURSE PRACTITIONER

## 2021-03-08 NOTE — RESULT ENCOUNTER NOTE
Mechelle,    Thyroid level is normal and I would not make any changes to your dose.  However, I would want to check before you start trying to become pregnant and in early pregnancy.  We want people around 2-2.5 for pregnancy.    If you have any questions, please feel free to contact the clinic.    KARLY Child

## 2021-03-10 LAB
COPATH REPORT: NORMAL
PAP: NORMAL

## 2021-03-12 LAB
FINAL DIAGNOSIS: NORMAL
HPV HR 12 DNA CVX QL NAA+PROBE: NEGATIVE
HPV16 DNA SPEC QL NAA+PROBE: NEGATIVE
HPV18 DNA SPEC QL NAA+PROBE: NEGATIVE
SPECIMEN DESCRIPTION: NORMAL
SPECIMEN SOURCE CVX/VAG CYTO: NORMAL

## 2021-03-16 ENCOUNTER — TELEPHONE (OUTPATIENT)
Dept: UROLOGY | Facility: CLINIC | Age: 31
End: 2021-03-16

## 2021-03-16 ENCOUNTER — PATIENT OUTREACH (OUTPATIENT)
Dept: UROLOGY | Facility: CLINIC | Age: 31
End: 2021-03-16

## 2021-03-16 DIAGNOSIS — N39.0 URINARY TRACT INFECTION: ICD-10-CM

## 2021-03-16 RX ORDER — AMOXICILLIN 250 MG/1
250 CAPSULE ORAL
Qty: 30 CAPSULE | Refills: 0 | Status: SHIPPED | OUTPATIENT
Start: 2021-03-16 | End: 2021-03-25

## 2021-03-16 NOTE — TELEPHONE ENCOUNTER
[facility] Call Center    Phone Message    May a detailed message be left on voicemail: no     Reason for Call: Other: Pt, Mecehlle calling for sooner appt.  Follow up to UTI/ per Pt, Mechelle.  She has off all next week.  I did try [facility].     Comment: Call: [phone]    Action Taken: Message routed to:  [facility] Urology    Travel Screening: Not Applicable

## 2021-03-16 NOTE — TELEPHONE ENCOUNTER
Pedro Peres,      Please let the urology clinic coordinator schedulers know if this is possible for the patient to see Dr. Bates next week.      Thanks, Oumar Figueredo MA

## 2021-03-16 NOTE — TELEPHONE ENCOUNTER
Patient called to say she had urinary tract infection symptoms last evening around 9 pm. She had urinary frequency every 5 minutes to the bathroom with a large amount of pain.   Patient started amoxicillin 250 mg last night. She feels better since now with less trips to the bathroom and less pain.   Patient is scheduling an appointment with Dr. Bates   Refilled antibiotic   Flower Brice, CELE   Care Coordinator Urology

## 2021-03-17 NOTE — TELEPHONE ENCOUNTER
Left a detailed VM for pt to call me back to get scheduled for a in person visit on 3/18 or virtual 3/25.

## 2021-03-18 ENCOUNTER — PRE VISIT (OUTPATIENT)
Dept: UROLOGY | Facility: CLINIC | Age: 31
End: 2021-03-18

## 2021-03-19 NOTE — TELEPHONE ENCOUNTER
Reason for visit: UTI follow up     Relevant information: Simone    Records/imaging/labs/orders: all records available     Pt called: no need for a call

## 2021-03-25 ENCOUNTER — VIRTUAL VISIT (OUTPATIENT)
Dept: UROLOGY | Facility: CLINIC | Age: 31
End: 2021-03-25
Payer: COMMERCIAL

## 2021-03-25 DIAGNOSIS — Z87.440 HISTORY OF UTI: ICD-10-CM

## 2021-03-25 PROCEDURE — 99213 OFFICE O/P EST LOW 20 MIN: CPT | Mod: 95 | Performed by: UROLOGY

## 2021-03-25 RX ORDER — AMOXICILLIN 250 MG/1
250 CAPSULE ORAL
Qty: 30 CAPSULE | Refills: 3 | Status: SHIPPED | OUTPATIENT
Start: 2021-03-25 | End: 2021-11-17

## 2021-03-25 ASSESSMENT — PAIN SCALES - GENERAL: PAINLEVEL: NO PAIN (0)

## 2021-03-25 NOTE — LETTER
"3/25/2021       RE: Mechelle Mata  [address] [facility] [address] [facility] [address]     Dear Colleague,    Thank you for referring your patient, Mechelle Mata, to the [facility]. Please see a copy of my visit note below.    Mechelle is a 30 year old who is being evaluated via a billable video visit.      How would you like to obtain your AVS? MyChart  If the video visit is dropped, the invitation should be resent by: Text to cell phone: [phone]  Will anyone else be joining your video visit? No      Video Start Time: 9:49 AM    Assessment & Plan     History of Urinary tract infection  Continue current regimen with amoxicillin and ellura  - amoxicillin (AMOXIL) 250 MG capsule; Take 1 capsule (250 mg) by mouth once as needed (after coitus)    Return in about 6 months (around 9/25/2021) for using a video visit.    Chayito Bates MD MPH  (she/her/hers)   of Urology  [facility]  CC  Patient Care Team:  Gauri Brice APRN CNP as PCP - General (Nurse Practitioner - Family)  Gauri Brice APRN CNP as Assigned PCP  Chayito Bates MD as MD (Urology)  Chayito Bates MD as Assigned Surgical Provider  Zuri Dumont MD as Assigned OBGYN Provider  SELF, REFERRED    Subjective   Mechelle is a 30 year old who presents for the following health issues     HPI     She has been alternating between amoxicillin and Ellura after intercourse.    She recently developed a UTI, states symptoms were really severe and she was unable to go give a urine with her little one at home so she self started with the amoxicillin and the symptoms resolved with a 3 day course.  This is the first one in awhile.  She is unable to take pyridium when symptoms are bad because she is breastfeeding    She thinks that the Ellura may not have worked recently and wonders if UTI risk  depends on how \"messy\" intercourse " is.    She is thinking about trying to get pregnant again in May and asks about UTI and pelvic floor risks.  We discussed that certainly we would have her continue her current regimen during pregnancy which should be safe but would have to closely monitor her as she does have increased risk of UTI.  We also discussed that she should consider seeing Melody at [facility] during the pregnancy as well to optimize her pelvic floor health.        Objective           Vitals:  No vitals were obtained today due to virtual visit.    Physical Exam   GENERAL: Healthy, alert and no distress  EYES: Eyes grossly normal to inspection.  No discharge or erythema, or obvious scleral/conjunctival abnormalities.  RESP: No audible wheeze, cough, or visible cyanosis.  No visible retractions or increased work of breathing.    SKIN: Visible skin clear. No significant rash, abnormal pigmentation or lesions.  NEURO: Cranial nerves grossly intact.  Mentation and speech appropriate for age.  PSYCH: Mentation appears normal, affect normal/bright, judgement and insight intact, normal speech and appearance well-groomed.          Video-Visit Details    Type of service:  Video Visit    Video End Time:10:04 AM    Originating Location (pt. Location): Home    Distant Location (provider location):  [facility] UROLOGY CLINIC [place]     Platform used for Video Visit: MotionDSP

## 2021-03-25 NOTE — PROGRESS NOTES
"Mechelle is a 30 year old who is being evaluated via a billable video visit.      How would you like to obtain your AVS? MyChart  If the video visit is dropped, the invitation should be resent by: Text to cell phone: [phone]  Will anyone else be joining your video visit? No      Video Start Time: 9:49 AM    Assessment & Plan     History of Urinary tract infection  Continue current regimen with amoxicillin and ellura  - amoxicillin (AMOXIL) 250 MG capsule; Take 1 capsule (250 mg) by mouth once as needed (after coitus)    Return in about 6 months (around 9/25/2021) for using a video visit.    Chayito Bates MD MPH  (she/her/hers)   of Urology  [facility]  Patient Care Team:  Gauri Brice APRN CNP as PCP - General (Nurse Practitioner - Family)  Gauri Brice APRN CNP as Assigned PCP  Chayito Bates MD as MD (Urology)  Chayito Bates MD as Assigned Surgical Provider  Zuri Dumont MD as Assigned OBGYN Provider  SELF, REFERRED    Subjective   Mechelle is a 30 year old who presents for the following health issues     HPI     She has been alternating between amoxicillin and Ellura after intercourse.    She recently developed a UTI, states symptoms were really severe and she was unable to go give a urine with her little one at home so she self started with the amoxicillin and the symptoms resolved with a 3 day course.  This is the first one in awhile.  She is unable to take pyridium when symptoms are bad because she is breastfeeding    She thinks that the Ellura may not have worked recently and wonders if UTI risk  depends on how \"messy\" intercourse is.    She is thinking about trying to get pregnant again in May and asks about UTI and pelvic floor risks.  We discussed that certainly we would have her continue her current regimen during pregnancy which should be safe but would have to closely monitor her as she does have increased risk of UTI.  We also discussed " that she should consider seeing Melody at [facility] during the pregnancy as well to optimize her pelvic floor health.        Objective           Vitals:  No vitals were obtained today due to virtual visit.    Physical Exam   GENERAL: Healthy, alert and no distress  EYES: Eyes grossly normal to inspection.  No discharge or erythema, or obvious scleral/conjunctival abnormalities.  RESP: No audible wheeze, cough, or visible cyanosis.  No visible retractions or increased work of breathing.    SKIN: Visible skin clear. No significant rash, abnormal pigmentation or lesions.  NEURO: Cranial nerves grossly intact.  Mentation and speech appropriate for age.  PSYCH: Mentation appears normal, affect normal/bright, judgement and insight intact, normal speech and appearance well-groomed.          Video-Visit Details    Type of service:  Video Visit    Video End Time:10:04 AM    Originating Location (pt. Location): Home    Distant Location (provider location):  [facility] UROLOGY CLINIC [place]     Platform used for Video Visit: GBooking

## 2021-03-25 NOTE — PATIENT INSTRUCTIONS
Websites with free information:    American Urogynecologic Society patient website: www.voicesforpfd.org    Total Control Program: www.totalcontrolprogram.com    Supplements to prevent UTI to consider  -Probiotics  -Cranberry (for these products let them know a doctor is recommending them)   Ellura: www.myellura.Buck   Theracran HP by [employer] [facility] [address]  -d-mannose  -Vitamin C 500-1000mg twice a day    It was a pleasure meeting with you today.  Thank you for allowing me and my team the privilege of caring for you today.  YOU are the reason we are here, and I truly hope we provided you with the excellent service you deserve.  Please let us know if there is anything else we can do for you so that we can be sure you are leaving completely satisfied with your care experience.

## 2021-03-25 NOTE — NURSING NOTE
Chief Complaint   Patient presents with     RECHECK     recurrent UTI's, no current symptoms related      - Mae Moses,   EMT Clinic Support

## 2021-04-19 ENCOUNTER — THERAPY VISIT (OUTPATIENT)
Dept: PHYSICAL THERAPY | Facility: CLINIC | Age: 31
End: 2021-04-19
Payer: COMMERCIAL

## 2021-04-19 DIAGNOSIS — Z98.891 S/P CESAREAN SECTION: Primary | ICD-10-CM

## 2021-04-19 PROCEDURE — 97110 THERAPEUTIC EXERCISES: CPT | Mod: GP | Performed by: PHYSICAL THERAPIST

## 2021-04-19 PROCEDURE — 97140 MANUAL THERAPY 1/> REGIONS: CPT | Mod: GP | Performed by: PHYSICAL THERAPIST

## 2021-04-19 NOTE — PROGRESS NOTES
Subjective:  HPI  Physical Exam                    Objective:  System    Physical Exam    General     ROS    Assessment/Plan:    PROGRESS  REPORT    Progress reporting period is from 1/18/21 to 4/19/21.       SUBJECTIVE  Subjective changes noted by patient:   Subjective: Mechelle mentions of some lower abdomen pain around the c-s ection area and above it. She notices pain during ovulation and during her period. No pain with intercourse mentioned. She got one new UTI month ago- all that is cleared right now. No UI mentioned.  She thinks frequency of urination is slightly more right now every 1.5 hours to 3 hrs.     Current pain level is  Current Pain level: 2/10(annoying pain).     Previous pain level was    .   Changes in function:  Yes (See Goal flowsheet attached for changes in current functional level)  Adverse reaction to treatment or activity: None    OBJECTIVE  Changes noted in objective findings:    Objective: Increased tenderenss around c- section scar tissue upon palpation, Rectus abdominus left side, Iliopsoas both sides. No pain with internal pelvic floor examination today. Felt better with MFR lower abdomen and around 10-2'oclock pelvic floor.      ASSESSMENT/PLAN  Updated problem list and treatment plan: Diagnosis 1:  Post partum care - Lower abdomen pain Pain -  hot/cold therapy, manual therapy, STS, self management, education and home program  Decreased ROM/flexibility - manual therapy, therapeutic exercise, therapeutic activity and home program  Decreased function - therapeutic activities and home program  STG/LTGs have been met or progress has been made towards goals:  Yes (See Goal flow sheet completed today.)  Assessment of Progress: The patient's condition is improving.  The patient's condition has potential to improve.  The patient has met all of their long term goals.  New goal added  Self Management Plans:  Patient has been instructed in a home treatment program.  Patient  has been instructed in  self management of symptoms.  I have re-evaluated this patient and find that the nature, scope, duration and intensity of the therapy is appropriate for the medical condition of the patient.  Mechelle continues to require the following intervention to meet STG and LTG's:  PT    Recommendations:  This patient would benefit from continued therapy.     Frequency:  2 X a month, once daily  Duration:  for 2 months        Please refer to the daily flowsheet for treatment today, total treatment time and time spent performing 1:1 timed codes.

## 2021-06-28 ENCOUNTER — VIRTUAL VISIT (OUTPATIENT)
Dept: FAMILY MEDICINE | Facility: CLINIC | Age: 31
End: 2021-06-28
Payer: COMMERCIAL

## 2021-06-28 DIAGNOSIS — J45.20 MILD INTERMITTENT ASTHMA WITHOUT COMPLICATION: ICD-10-CM

## 2021-06-28 DIAGNOSIS — E03.9 HYPOTHYROIDISM, UNSPECIFIED TYPE: ICD-10-CM

## 2021-06-28 DIAGNOSIS — Z32.01 PREGNANCY TEST POSITIVE: Primary | ICD-10-CM

## 2021-06-28 DIAGNOSIS — F33.1 MAJOR DEPRESSIVE DISORDER, RECURRENT EPISODE, MODERATE (H): ICD-10-CM

## 2021-06-28 LAB
B-HCG SERPL-ACNC: 123 IU/L (ref 0–5)
TSH SERPL DL<=0.005 MIU/L-ACNC: 2.03 MU/L (ref 0.4–4)

## 2021-06-28 PROCEDURE — 99214 OFFICE O/P EST MOD 30 MIN: CPT | Mod: 95 | Performed by: NURSE PRACTITIONER

## 2021-06-28 PROCEDURE — 84702 CHORIONIC GONADOTROPIN TEST: CPT | Performed by: NURSE PRACTITIONER

## 2021-06-28 PROCEDURE — 84443 ASSAY THYROID STIM HORMONE: CPT | Performed by: NURSE PRACTITIONER

## 2021-06-28 ASSESSMENT — ASTHMA QUESTIONNAIRES
QUESTION_1 LAST FOUR WEEKS HOW MUCH OF THE TIME DID YOUR ASTHMA KEEP YOU FROM GETTING AS MUCH DONE AT WORK, SCHOOL OR AT HOME: NONE OF THE TIME
QUESTION_2 LAST FOUR WEEKS HOW OFTEN HAVE YOU HAD SHORTNESS OF BREATH: NOT AT ALL
QUESTION_3 LAST FOUR WEEKS HOW OFTEN DID YOUR ASTHMA SYMPTOMS (WHEEZING, COUGHING, SHORTNESS OF BREATH, CHEST TIGHTNESS OR PAIN) WAKE YOU UP AT NIGHT OR EARLIER THAN USUAL IN THE MORNING: NOT AT ALL
QUESTION_5 LAST FOUR WEEKS HOW WOULD YOU RATE YOUR ASTHMA CONTROL: WELL CONTROLLED
ACT_TOTALSCORE: 24
QUESTION_4 LAST FOUR WEEKS HOW OFTEN HAVE YOU USED YOUR RESCUE INHALER OR NEBULIZER MEDICATION (SUCH AS ALBUTEROL): NOT AT ALL

## 2021-06-28 ASSESSMENT — PATIENT HEALTH QUESTIONNAIRE - PHQ9: SUM OF ALL RESPONSES TO PHQ QUESTIONS 1-9: 1

## 2021-06-28 NOTE — PROGRESS NOTES
Mechelle is a 31 year old who is being evaluated via a billable video visit.      How would you like to obtain your AVS? MyChart  If the video visit is dropped, the invitation should be resent by: Text to cell phone: [phone]   Will anyone else be joining your video visit? No    Video Start Time: 11:21 AM    Assessment & Plan       ICD-10-CM    1. Pregnancy test positive  Z32.01 HCG Quantitative Pregnancy, Blood (UCE188)   2. Major depressive disorder, recurrent episode, moderate (H)  F33.1 HCG Quantitative Pregnancy, Blood (GSD240)   3. Hypothyroidism, unspecified type  E03.9 **TSH with free T4 reflex FUTURE anytime   4. Mild intermittent asthma without complication  J45.20    this preg was desired, has had 5 positive urine hcg and discussed accuracy of these is good  However, due to her depression and anxiety, provider in past checked qual levels with this preg. Although we discussed this test not normally done, agreed to do since this was plan with her previous provider to lower her anxiety.   Follow up with ObGyn for results and first prenatal    Discussed safe meds, takiung pnv    Asthma - has been stable, discussed symbicort is new med for her and she does okay with albuterol prn exercise and exac rare   Will hold symbicort for now and they can listen to her lungs during her prenatal and assess control     Thyroid stable last preg, lab only visit to check and continue on same dose if indicated       Return for lab only and fu with ObGyn nurse appt .    Barbara Redman, ISAIAS CNP  [facility]    Subjective   Mechelle is a 31 year old who presents for the following health issues     HPI     Pt would like blood tests ordered- pregnancy test and Thyroid test.    5 preg tests positive but wants blood numbers    symbicort recently started  Albuterol since 4th grade   advair made asthma worse  Exercise induced  Last time had stable thyroid  Prenatal and benefiber    Gestational HTN history no  issues since then, started at 35 weeks     Feels a bit tired, good self care      Review of Systems   Constitutional, HEENT, cardiovascular, pulmonary, GI, , musculoskeletal, neuro, skin, endocrine and psych systems are negative, except as otherwise noted.      Objective           Vitals:  No vitals were obtained today due to virtual visit.    Physical Exam   GENERAL: Healthy, alert and no distress  EYES: Eyes grossly normal to inspection.  No discharge or erythema, or obvious scleral/conjunctival abnormalities.  RESP: No audible wheeze, cough, or visible cyanosis.  No visible retractions or increased work of breathing.    SKIN: Visible skin clear. No significant rash, abnormal pigmentation or lesions.  NEURO: Cranial nerves grossly intact.  Mentation and speech appropriate for age.  PSYCH: Mentation appears normal, affect normal/bright, judgement and insight intact, normal speech and appearance well-groomed.    No results found for this or any previous visit (from the past 24 hour(s)).            Video-Visit Details    Type of service:  Video Visit    Video End Time:11:40 AM    Originating Location (pt. Location): Home    Distant Location (provider location):  [facility]     Platform used for Video Visit: Peerby

## 2021-06-29 ASSESSMENT — ASTHMA QUESTIONNAIRES: ACT_TOTALSCORE: 24

## 2021-07-14 ENCOUNTER — PRENATAL OFFICE VISIT (OUTPATIENT)
Dept: NURSING | Facility: CLINIC | Age: 31
End: 2021-07-14
Payer: COMMERCIAL

## 2021-07-14 ENCOUNTER — TRANSCRIBE ORDERS (OUTPATIENT)
Dept: MATERNAL FETAL MEDICINE | Facility: CLINIC | Age: 31
End: 2021-07-14

## 2021-07-14 VITALS — BODY MASS INDEX: 23.19 KG/M2 | WEIGHT: 153 LBS | HEIGHT: 68 IN

## 2021-07-14 DIAGNOSIS — O26.90 PREGNANCY RELATED CONDITION, ANTEPARTUM: Primary | ICD-10-CM

## 2021-07-14 DIAGNOSIS — Z23 NEED FOR TDAP VACCINATION: ICD-10-CM

## 2021-07-14 DIAGNOSIS — Z34.90 ENCOUNTER FOR SUPERVISION OF NORMAL PREGNANCY: Primary | ICD-10-CM

## 2021-07-14 PROBLEM — Z36.89 ENCOUNTER FOR TRIAGE IN PREGNANT PATIENT: Status: RESOLVED | Noted: 2019-11-08 | Resolved: 2021-07-14

## 2021-07-14 PROBLEM — E03.9 HYPOTHYROIDISM: Status: ACTIVE | Noted: 2021-07-14

## 2021-07-14 PROBLEM — F32.5 MAJOR DEPRESSIVE DISORDER, SINGLE EPISODE, IN REMISSION (H): Status: ACTIVE | Noted: 2021-07-14

## 2021-07-14 PROBLEM — F41.9 ANXIETY: Status: ACTIVE | Noted: 2021-07-14

## 2021-07-14 PROCEDURE — 99207 PR NO CHARGE NURSE ONLY: CPT

## 2021-07-14 ASSESSMENT — MIFFLIN-ST. JEOR: SCORE: 1444.56

## 2021-07-14 NOTE — PROGRESS NOTES
Important Information for Provider:     New ob nurse intake by phone, second pregnancy. History of C section. Handouts reviewed and mailed. taking B6, Unisom for nausea. Ordered genetic screening.   TSH drawn 6/28/2021. History of gestational hypertension. Ultrasound scheduled for 8/03/2021 with CNM to review results. NOB with Dr Dumont 8/05/2021        Caffeine intake/servings daily - 0  Calcium intake/servings daily - 3  Exercise 5 times weekly - describe ; home workouts, precautions given   Sunscreen used - Yes  Seatbelts used - Yes  Guns stored in the home - No    Pap test up to date -  Yes  Eye exam up to date -  Yes  Dental exam up to date -  Yes  Immunizations reviewed and up to date - Yes  Abuse: Current or Past (Physical, Sexual or Emotional) - No  Do you feel safe in your environment - Yes  Do you cope well with stress - Yes        Prenatal OB Questionnaire  Patient supplied answers from flow sheet for:  Prenatal OB Questionnaire.  Past Medical History    Have you ever been in a major accident or suffered serious trauma?: No  Past Medical History 2   Have you ever received a blood transfusion?: No  Does anyone in your home smoke?: No   Is your blood type Rh negative?: No  Have you ever ?: (!) Yes   Have you been hospitalized for a nonsurgical reason excluding normal delivery?: No  Have you ever had an abnormal pap smear?: No    Past Medical History (Continued)  Do you have a history of abnormalities of the uterus?: No  Do you have any other problems we have not asked about which you feel may be important to this pregnancy?: No     Allergies as of 7/14/2021:    Allergies as of 07/14/2021     (No Known Allergies)       Current medications are:  Current Outpatient Medications   Medication Sig Dispense Refill     albuterol (PROVENTIL HFA) 108 (90 Base) MCG/ACT inhaler Inhale 2 puffs into the lungs every 6 hours as needed for shortness of breath / dyspnea or wheezing 18 g 5     clindamycin (CLINDAMAX)  Thanks, Connie!   1 % external gel Apply topically 2 times daily 60 g 3     levothyroxine (SYNTHROID/LEVOTHROID) 50 MCG tablet Take 1 tablet (50 mcg) by mouth daily 90 tablet 3     Prenatal Vit-Fe Fumarate-FA (PRENATAL PO)        WELLBUTRIN  MG 24 hr tablet Take 1 tablet (150 mg) by mouth every morning 90 tablet 3     amoxicillin (AMOXIL) 250 MG capsule Take 1 capsule (250 mg) by mouth once as needed (after coitus) (Patient not taking: Reported on 7/14/2021) 30 capsule 3         Early ultrasound screening tool:    Does patient have irregular periods?  No  Did patient use hormonal birth control in the three months prior to positive urine pregnancy test? No  Is the patient breastfeeding?  No  Is the patient 10 weeks or greater at time of education visit?  No

## 2021-08-03 ENCOUNTER — ANCILLARY PROCEDURE (OUTPATIENT)
Dept: ULTRASOUND IMAGING | Facility: CLINIC | Age: 31
End: 2021-08-03
Attending: OBSTETRICS & GYNECOLOGY
Payer: COMMERCIAL

## 2021-08-03 ENCOUNTER — OFFICE VISIT (OUTPATIENT)
Dept: MIDWIFE SERVICES | Facility: CLINIC | Age: 31
End: 2021-08-03
Payer: COMMERCIAL

## 2021-08-03 VITALS
BODY MASS INDEX: 24.05 KG/M2 | HEIGHT: 68 IN | SYSTOLIC BLOOD PRESSURE: 116 MMHG | WEIGHT: 158.7 LBS | DIASTOLIC BLOOD PRESSURE: 61 MMHG | HEART RATE: 80 BPM

## 2021-08-03 DIAGNOSIS — Z34.81 ENCOUNTER FOR SUPERVISION OF OTHER NORMAL PREGNANCY IN FIRST TRIMESTER: Primary | ICD-10-CM

## 2021-08-03 DIAGNOSIS — Z34.90 ENCOUNTER FOR SUPERVISION OF NORMAL PREGNANCY: ICD-10-CM

## 2021-08-03 PROCEDURE — 76801 OB US < 14 WKS SINGLE FETUS: CPT | Performed by: OBSTETRICS & GYNECOLOGY

## 2021-08-03 PROCEDURE — 99212 OFFICE O/P EST SF 10 MIN: CPT | Performed by: ADVANCED PRACTICE MIDWIFE

## 2021-08-03 RX ORDER — PYRIDOXINE HCL (VITAMIN B6) 25 MG
25 TABLET ORAL DAILY
Status: ON HOLD | COMMUNITY
End: 2022-03-01

## 2021-08-03 ASSESSMENT — MIFFLIN-ST. JEOR: SCORE: 1470.42

## 2021-08-03 NOTE — PROGRESS NOTES
Mechelle Mata 31 year old Patient's last menstrual period was 2021.      CC: Early U/S review    HPI:  Pt here with  and daughter Suzanne.  Prelim report of U/S = viable IUP  @ 9w0d, .    Couple has NOB appt with Dr Dumont in 2 days.    Pleased with results, no questions.    A/P:  Viable IUP @ 9w2d   Results reviewed with pt and family  RTC as scheduled 21    ISAIAS Kay CNM

## 2021-08-04 NOTE — PROGRESS NOTES
OB - New OB History and Physical  Date of visit: 2021  Chief Complaint: To establish prenatal care    HPI: Mechelle Mata is a 31 year old  at 9w4d as dated by LMP c/w 9w US.   Estimated Date of Delivery: Mar 6, 2022.    This was a planned pregnancy.   supportive and present for this visit.    Since becoming pregnant, patient reports she's been feeling nauseated.  Taking Unisom sometimes during the day as well.      Hx of gestational hypertension, PLTCS and exercise induced asthma.    Ultrasound: 8/3/21 showed single live IUP measuring 9w0d    Obstetric history:     OB History    Para Term  AB Living   2 1 1 0 0 1   SAB TAB Ectopic Multiple Live Births   0 0 0 0 1      # Outcome Date GA Lbr [place]/2nd Weight Sex Delivery Anes PTL Lv   2 Current            1 Term 19 37w3d  2.722 kg (6 lb) F CS-LTranv  N LUKE      Complications: Fetal Intolerance, GBS      Name: KT,FEMALE-MECHELLE      Apgar1: 7  Apgar5: 9     Patient denies history of gestational hypertension, gestational diabetes,  labor.    Gynecologic History:   Menstrual Interval: generally normal  Patient's last menstrual period was 2021.   STI history: neg  Last Pap: 3/5/21  History of abnormal pap: denies    Allergy: Patient has no known allergies.  Patient denies food, latex or environmental allergies.     Current Medications:  Current Outpatient Medications   Medication     albuterol (PROVENTIL HFA) 108 (90 Base) MCG/ACT inhaler     clindamycin (CLINDAMAX) 1 % external gel     docusate sodium (COLACE) 50 MG capsule     doxylamine (UNISOM) 25 MG TABS tablet     levothyroxine (SYNTHROID/LEVOTHROID) 50 MCG tablet     Prenatal Vit-Fe Fumarate-FA (PRENATAL PO)     pyridOXINE (VITAMIN  B-6) 25 MG tablet     WELLBUTRIN  MG 24 hr tablet     amoxicillin (AMOXIL) 250 MG capsule     No current facility-administered medications for this visit.       Past Medical History:  Past Medical History:   Diagnosis Date  "    Depressive disorder      Thyroid disease      Uncomplicated asthma        Past Surgical History:  Past Surgical History:   Procedure Laterality Date     C EXCISION OF GANGLION CYST FOREARM Left 2006    wrist      SECTION N/A 2019    Procedure:  SECTION;  Surgeon: Zuri Cisneros MD;  Location: [facility] L+D     wisdom teeth         Social History:  Patient lives with , daughter.  Patient's relationship status is: .    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   She feels safe in her relationship. Patient denies history of sexual, physical or mental abuse.    Family History:  Family History   Problem Relation Age of Onset     Mental Illness Mother      Depression Mother      Heart Defect Mother         \"mini-stroke\" 2/2 hole in her heart     Mental Illness Father      Depression Father      Heart Failure Paternal Grandfather        Review of Systems  Gen:  no change in weight, no fever, no chills, no fatigue  CV: no palpitations, no chest pain, no hypertension, no syncope  Resp: no shortness of breath, no cough, no wheezing, no asthma  GI: + nausea, + vomiting, no diarrhea, no constipation, no bloating, no GERD  :  no vaginal discharge, no dysuria, no abnormal bleeding, no pelvic pain   Endo: no thyroid problems, no cold/heat intolerance, no acne, no hirsutism, no diabetes  Heme: no easy bruising or bleeding, no history of DVT/PE/CVA  Neuro: no headaches, no seizures, no strokes, no focal deficits      Physical Exam:  Vitals:    21 1236   BP: 120/71   Pulse: 78   SpO2: 100%   Weight: 70.8 kg (156 lb)     Body mass index is 24.07 kg/m .  Gen: alert, oriented, no distress,  pleasant, appears stated age, appropriately groomed  Neck: supple, trachea midline, no thyromegaly, no lymphadenopathy  HEENT: head normocephalic, atraumatic, normal oropharynx without erythema or exudates  CV: normal heart " sounds, regular rate and rhythm, no murmurs  Resp: good inspiratory effort, lungs clear to ascultation bilaterally, no wheezes or rhonchi  Breast: symmetrical without masses, skin changes or nipple discharge.   Abd: soft, , nontender, nondistended, normoactive bowel sounds,  no masses or organomegaly, no hernias, well healed  scar  : normal external genitalia with lesions or erythema; normal, well supported urethra, normal Bartholins, normal Skenes; normal pink rugated vaginal mucosa, no lesions or abnormal discharge; . Bimanual exam shows 10week sized uterus, mobile, no fundal tenderness, no CMT, no adnexal masses or tenderness. Cervix closed, WNL  Extr: warm, well perfused, nontender, no edema  Psych: affect bright, cooperative, responds appropriately      Assessment:  Mechelle Mata is a 31 year old  at 9w4d presenting to establish prenatal care.    Problem List:   Hx of gestational hypertension  PLTCS for cat 2 FHT remote from delivery.      Plan:  1. Hx of gestational hypertension: Baseline labs today.  Recommended started baby ASA daily at 12w  2. Hx of PLTCS for cat 2 RFD  3. Reviewed routine prenatal care. Discussed MD call schedule as well as role of residents and med students both in clinic and hospital.  Discussed resident involvement in her care.  Prefers no resident for labor, but is excepting of resident assist for  delivery   4. Pap: UTD   5. Diet, Nutrition and Exercise:  Continue PNVs. Continue normal exercise. Her prepregnancy BMI is 23.  According to the WHO guidelines, patient is given a goal of gaining approximately 25-35 pounds during the course of her pregnancy.    6. Immunizations: plan TdaP at 28 weeks  7. Fetal anomaly screening: scheduled  8. Routine Prenatal Care: the patient will return to clinic in 4 weeks and prn    Zuri Dumont MD

## 2021-08-05 ENCOUNTER — PRENATAL OFFICE VISIT (OUTPATIENT)
Dept: OBGYN | Facility: CLINIC | Age: 31
End: 2021-08-05
Payer: COMMERCIAL

## 2021-08-05 VITALS
DIASTOLIC BLOOD PRESSURE: 71 MMHG | SYSTOLIC BLOOD PRESSURE: 120 MMHG | BODY MASS INDEX: 24.07 KG/M2 | HEART RATE: 78 BPM | WEIGHT: 156 LBS | OXYGEN SATURATION: 100 %

## 2021-08-05 DIAGNOSIS — Z98.891 S/P CESAREAN SECTION: ICD-10-CM

## 2021-08-05 DIAGNOSIS — Z34.81 ENCOUNTER FOR SUPERVISION OF OTHER NORMAL PREGNANCY IN FIRST TRIMESTER: Primary | ICD-10-CM

## 2021-08-05 DIAGNOSIS — Z87.59 HISTORY OF GESTATIONAL HYPERTENSION: ICD-10-CM

## 2021-08-05 PROCEDURE — 99207 PR FIRST OB VISIT: CPT | Performed by: OBSTETRICS & GYNECOLOGY

## 2021-08-06 ENCOUNTER — LAB (OUTPATIENT)
Dept: LAB | Facility: CLINIC | Age: 31
End: 2021-08-06
Payer: COMMERCIAL

## 2021-08-06 DIAGNOSIS — Z34.90 ENCOUNTER FOR SUPERVISION OF NORMAL PREGNANCY: ICD-10-CM

## 2021-08-06 LAB
ABO/RH(D): NORMAL
ALBUMIN UR-MCNC: NEGATIVE MG/DL
ANTIBODY SCREEN: NEGATIVE
APPEARANCE UR: CLEAR
BILIRUB UR QL STRIP: NEGATIVE
COLOR UR AUTO: YELLOW
CREAT UR-MCNC: 126 MG/DL
ERYTHROCYTE [DISTWIDTH] IN BLOOD BY AUTOMATED COUNT: 13.9 % (ref 10–15)
GLUCOSE UR STRIP-MCNC: NEGATIVE MG/DL
HCT VFR BLD AUTO: 37.6 % (ref 35–47)
HGB BLD-MCNC: 12.9 G/DL (ref 11.7–15.7)
HGB UR QL STRIP: NEGATIVE
KETONES UR STRIP-MCNC: NEGATIVE MG/DL
LEUKOCYTE ESTERASE UR QL STRIP: NEGATIVE
MCH RBC QN AUTO: 30.8 PG (ref 26.5–33)
MCHC RBC AUTO-ENTMCNC: 34.3 G/DL (ref 31.5–36.5)
MCV RBC AUTO: 90 FL (ref 78–100)
NITRATE UR QL: NEGATIVE
PH UR STRIP: 5.5 [PH] (ref 5–7)
PLATELET # BLD AUTO: 198 10E3/UL (ref 150–450)
PROT UR-MCNC: 0.09 G/L
PROT/CREAT 24H UR: 0.07 G/G CR (ref 0–0.2)
RBC # BLD AUTO: 4.19 10E6/UL (ref 3.8–5.2)
SP GR UR STRIP: >=1.03 (ref 1–1.03)
SPECIMEN EXPIRATION DATE: NORMAL
UROBILINOGEN UR STRIP-ACNC: 0.2 E.U./DL
WBC # BLD AUTO: 8.1 10E3/UL (ref 4–11)

## 2021-08-06 PROCEDURE — 84443 ASSAY THYROID STIM HORMONE: CPT

## 2021-08-06 PROCEDURE — 86780 TREPONEMA PALLIDUM: CPT

## 2021-08-06 PROCEDURE — 84156 ASSAY OF PROTEIN URINE: CPT

## 2021-08-06 PROCEDURE — 84450 TRANSFERASE (AST) (SGOT): CPT

## 2021-08-06 PROCEDURE — 82565 ASSAY OF CREATININE: CPT

## 2021-08-06 PROCEDURE — 86762 RUBELLA ANTIBODY: CPT

## 2021-08-06 PROCEDURE — 86900 BLOOD TYPING SEROLOGIC ABO: CPT

## 2021-08-06 PROCEDURE — 84460 ALANINE AMINO (ALT) (SGPT): CPT

## 2021-08-06 PROCEDURE — 87340 HEPATITIS B SURFACE AG IA: CPT

## 2021-08-06 PROCEDURE — 86803 HEPATITIS C AB TEST: CPT

## 2021-08-06 PROCEDURE — 81003 URINALYSIS AUTO W/O SCOPE: CPT

## 2021-08-06 PROCEDURE — 85027 COMPLETE CBC AUTOMATED: CPT

## 2021-08-06 PROCEDURE — 87389 HIV-1 AG W/HIV-1&-2 AB AG IA: CPT

## 2021-08-06 PROCEDURE — 86901 BLOOD TYPING SEROLOGIC RH(D): CPT

## 2021-08-06 PROCEDURE — 87086 URINE CULTURE/COLONY COUNT: CPT

## 2021-08-06 PROCEDURE — 86850 RBC ANTIBODY SCREEN: CPT

## 2021-08-06 PROCEDURE — 36415 COLL VENOUS BLD VENIPUNCTURE: CPT

## 2021-08-07 LAB
ALT SERPL W P-5'-P-CCNC: 28 U/L (ref 0–50)
AST SERPL W P-5'-P-CCNC: 17 U/L (ref 0–45)
BACTERIA UR CULT: NO GROWTH
CREAT SERPL-MCNC: 0.66 MG/DL (ref 0.52–1.04)
GFR SERPL CREATININE-BSD FRML MDRD: >90 ML/MIN/1.73M2
T PALLIDUM AB SER QL: NONREACTIVE
TSH SERPL DL<=0.005 MIU/L-ACNC: 1.9 MU/L (ref 0.4–4)

## 2021-08-08 LAB
HBV SURFACE AG SERPL QL IA: NONREACTIVE
HCV AB SERPL QL IA: NONREACTIVE
HIV 1+2 AB+HIV1 P24 AG SERPL QL IA: NONREACTIVE

## 2021-08-09 LAB
RUBV IGG SERPL QL IA: 14.3 INDEX
RUBV IGG SERPL QL IA: POSITIVE

## 2021-08-11 ENCOUNTER — PRE VISIT (OUTPATIENT)
Dept: MATERNAL FETAL MEDICINE | Facility: CLINIC | Age: 31
End: 2021-08-11

## 2021-08-13 ENCOUNTER — OFFICE VISIT (OUTPATIENT)
Dept: MATERNAL FETAL MEDICINE | Facility: CLINIC | Age: 31
End: 2021-08-13
Attending: OBSTETRICS & GYNECOLOGY
Payer: COMMERCIAL

## 2021-08-13 ENCOUNTER — LAB (OUTPATIENT)
Dept: LAB | Facility: CLINIC | Age: 31
End: 2021-08-13
Attending: OBSTETRICS & GYNECOLOGY
Payer: COMMERCIAL

## 2021-08-13 DIAGNOSIS — Z36.0 ENCOUNTER FOR ANTENATAL SCREENING FOR CHROMOSOMAL ANOMALIES: ICD-10-CM

## 2021-08-13 DIAGNOSIS — Z36.0 ENCOUNTER FOR ANTENATAL SCREENING FOR CHROMOSOMAL ANOMALIES: Primary | ICD-10-CM

## 2021-08-13 DIAGNOSIS — O26.90 PREGNANCY RELATED CONDITION, ANTEPARTUM: ICD-10-CM

## 2021-08-13 PROCEDURE — 36415 COLL VENOUS BLD VENIPUNCTURE: CPT

## 2021-08-13 PROCEDURE — 96040 HC GENETIC COUNSELING, EACH 30 MINUTES: CPT | Performed by: GENETIC COUNSELOR, MS

## 2021-08-13 NOTE — PROGRESS NOTES
[facility]  Genetic Counseling Consult    Patient: Mechelle Mata YOB: 1990   Date of Service: 21      Mechelle Mata was seen at [facility] [place] for genetic consultation to discuss the options for routine screening for fetal chromosome abnormalities. Mechelle was accompanied to today's visit by her partner, Brett.      Impression/Plan:   1.  Mechelle had a blood draw for NIPT through [facility].  Results are expected within 5-10 days, and will be available in EPIC.  We will contact her to discuss the results, and a copy will be forwarded to the office of the referring OB provider. Mechelle provided verbal permission for detailed results to be left on her voicemail. Mechelle opted to include screening for the sex chromosome conditions but chose not to learn the predicted fetal sex. We reviewed that if we obtain a clinically significant SCA, we will need to discuss the full clinical implications of that condition.     2.  Mechelle has declined scheduling a nuchal translucency ultrasound at this time.    3. Maternal serum AFP (single marker screen) is recommended after 15 weeks to screen for open neural tube defects. A quad screen should not be performed.    Pregnancy History:   /Parity:    Age at Delivery: 31 year old  KADEN: 3/6/2022, by Last Menstrual Period  Gestational Age: 10w5d    No significant complications or exposures were reported in the current pregnancy.    Mechelle jiang pregnancy history is significant for one prior full-term delivery, female, via  section. Alive and well today.    Medical History:   Mechelle jiang reported medical history is not expected to impact pregnancy management or risks to fetal development.       Family History:   A three-generation pedigree was previously obtained by [facility], and is scanned under the  Media  tab. This was updated today.    Mechelle and Brett report no significant updates. However,  Brett's sister has had a child in the interim, male, alive and well.     Otherwise, the reported family history is negative for multiple miscarriages, stillbirths, birth defects, intellectual disability, known genetic conditions, and consanguinity.       Carrier Screening:   The patient reports that she and the father of the pregnancy have  ancestry:      Cystic fibrosis is an autosomal recessive genetic condition that occurs with increased frequency in individuals of  ancestry and carrier screening for this condition is available.  In addition,  screening in the [facility] includes cystic fibrosis.      Expanded carrier screening for mutations in a large panel of genes associated with autosomal recessive conditions including cystic fibrosis, spinal muscular atrophy, and others, is now available.      Mechelle and Brett have previously opted to move forward with expanded carrier screening through [facility]. Mechelle ended up cancelling her testing due to the potential out of pocket cost. Brett was found to be a carrier of one condition, metachromatic leukodystrophy. This condition is caused by mutations in the gene ARSA. Individuals with this condition have significant nervous system effects such as progressive intellectual disability and loss of motor functions. This condition can be infantile onset, juvenile onset, or adult onset. There is currently no cure for this disease, though treatment can include bone marrow transplant which has been show to reduce symptoms. The reproductive risk for the couple is currently 1 in 630. We again reviewed the option of screening Mechelle for that condition. She declined at this time.        Risk Assessment for Chromosome Conditions:   We explained that the risk for fetal chromosome abnormalities increases with maternal age. We discussed specific features of common chromosome abnormalities, including Down syndrome, trisomy 13, trisomy 18, and sex chromosome  conditions.      - At age 31 at midtrimester, the risk to have a baby with Down syndrome is 1 in 508.     - At age 31 at midtrimester, the risk to have a baby with any chromosome abnormality is 1 in 254.          Testing Options:   We discussed the following options:   First trimester screening    First trimester ultrasound with nuchal translucency and nasal bone assessments, maternal plasma hCG, SOWMYA-A, and AFP measurement    Screens for fetal trisomy 21, trisomy 13, and trisomy 18    Cannot screen for open neural tube defects; maternal serum AFP after 15 weeks is recommended     Non-invasive Prenatal Testing (NIPT)    Maternal plasma cell-free DNA testing; first trimester ultrasound with nuchal translucency and nasal bone assessment is recommended, when appropriate    Screens for fetal trisomy 21, trisomy 13, trisomy 18, and sex chromosome aneuploidy    Cannot screen for open neural tube defects; maternal serum AFP after 15 weeks is recommended     Chorionic villus sampling (CVS)    Invasive procedure typically performed in the first trimester by which placental villi are obtained for the purpose of chromosome analysis and/or other prenatal genetic analysis    Diagnostic results; >99% sensitivity for fetal chromosome abnormalities    Cannot test for open neural tube defects; maternal serum AFP after 15 weeks is recommended     Genetic Amniocentesis    Invasive procedure typically performed in the second trimester by which amniotic fluid is obtained for the purpose of chromosome analysis and/or other prenatal genetic analysis    Diagnostic results; >99% sensitivity for fetal chromosome abnormalities    AFAFP measurement tests for open neural tube defects      We reviewed the benefits and limitations of this testing.  Screening tests provide a risk assessment specific to the pregnancy for certain fetal chromosome abnormalities, but cannot definitively diagnose or exclude a fetal chromosome abnormality.  Follow-up  genetic counseling and consideration of diagnostic testing is recommended with any abnormal screening result.     Diagnostic tests carry inherent risks- including risk of miscarriage- that require careful consideration.  These tests can detect fetal chromosome abnormalities with greater than 99% certainty.  Results can be compromised by maternal cell contamination or mosaicism, and are limited by the resolution of cytogenetic G-banding technology.  There is no screening nor diagnostic test that can detect all forms of birth defects or mental disability.     It was a pleasure to be involved with Mechelle [facility]. Face-to-face time of the meeting was 20 minutes.    Delphine Klein MS, [facility]  Licensed Genetic Counselor  [facility]  Maternal Fetal Medicine  [email or web address]  [phone]

## 2021-08-23 ENCOUNTER — TELEPHONE (OUTPATIENT)
Dept: MATERNAL FETAL MEDICINE | Facility: CLINIC | Age: 31
End: 2021-08-23

## 2021-08-23 LAB — SCANNED LAB RESULT: NORMAL

## 2021-08-23 NOTE — TELEPHONE ENCOUNTER
August 23, 2021    I spoke with Mechelle regarding her NIPT results.     Results indicate NO ANEUPLOIDY DETECTED for chromosomes 21, 18, 13, or the sex chromosomes.     This puts her current pregnancy at low risk for Down syndrome, trisomy 18, trisomy 13 and sex chromosome abnormalities. This test is reported to have the following sensitivities: Down syndrome- >99.9%, trisomy 18- >99.9%, and trisomy 13- >99.9%. Although these results are reassuring, this does not replace a standard chromosome analysis from a chorionic villus sampling or amniocentesis.     MSAFP is the appropriate second trimester screening test for open neural tube defects; the maternal quad screen is not recommended.    Her results are available in her Epic chart for her primary OB to review.     Delphine Klein MS, [facility]  Licensed Genetic Counselor  [facility]  Maternal Fetal Medicine  [email or web address]  [phone]

## 2021-09-02 ENCOUNTER — PRENATAL OFFICE VISIT (OUTPATIENT)
Dept: OBGYN | Facility: CLINIC | Age: 31
End: 2021-09-02
Payer: COMMERCIAL

## 2021-09-02 VITALS
DIASTOLIC BLOOD PRESSURE: 67 MMHG | BODY MASS INDEX: 24.54 KG/M2 | HEART RATE: 66 BPM | SYSTOLIC BLOOD PRESSURE: 116 MMHG | TEMPERATURE: 97.4 F | WEIGHT: 159 LBS

## 2021-09-02 DIAGNOSIS — Z34.80 SUPERVISION OF OTHER NORMAL PREGNANCY: Primary | ICD-10-CM

## 2021-09-02 PROCEDURE — 99207 PR PRENATAL VISIT: CPT | Performed by: OBSTETRICS & GYNECOLOGY

## 2021-09-02 NOTE — PROGRESS NOTES
Doing well.   Taking ASA.   Fetal survey ordered.   Having hip and back pain - seeing chiropractor.   Did not disclose sex with NIPT.   RTC 3 weeks.

## 2021-10-01 ENCOUNTER — PRENATAL OFFICE VISIT (OUTPATIENT)
Dept: OBGYN | Facility: CLINIC | Age: 31
End: 2021-10-01
Payer: COMMERCIAL

## 2021-10-01 VITALS
HEART RATE: 72 BPM | WEIGHT: 166.6 LBS | DIASTOLIC BLOOD PRESSURE: 67 MMHG | HEIGHT: 68 IN | BODY MASS INDEX: 25.25 KG/M2 | SYSTOLIC BLOOD PRESSURE: 112 MMHG

## 2021-10-01 DIAGNOSIS — Z34.80 SUPERVISION OF OTHER NORMAL PREGNANCY: Primary | ICD-10-CM

## 2021-10-01 DIAGNOSIS — Z23 NEED FOR PROPHYLACTIC VACCINATION AND INOCULATION AGAINST INFLUENZA: ICD-10-CM

## 2021-10-01 PROCEDURE — 90686 IIV4 VACC NO PRSV 0.5 ML IM: CPT | Performed by: OBSTETRICS & GYNECOLOGY

## 2021-10-01 PROCEDURE — 99000 SPECIMEN HANDLING OFFICE-LAB: CPT | Performed by: OBSTETRICS & GYNECOLOGY

## 2021-10-01 PROCEDURE — 90471 IMMUNIZATION ADMIN: CPT | Performed by: OBSTETRICS & GYNECOLOGY

## 2021-10-01 PROCEDURE — 82105 ALPHA-FETOPROTEIN SERUM: CPT | Mod: 90 | Performed by: OBSTETRICS & GYNECOLOGY

## 2021-10-01 PROCEDURE — 36415 COLL VENOUS BLD VENIPUNCTURE: CPT | Performed by: OBSTETRICS & GYNECOLOGY

## 2021-10-01 PROCEDURE — 99207 PR PRENATAL VISIT: CPT | Performed by: OBSTETRICS & GYNECOLOGY

## 2021-10-01 ASSESSMENT — ANXIETY QUESTIONNAIRES
7. FEELING AFRAID AS IF SOMETHING AWFUL MIGHT HAPPEN: NOT AT ALL
5. BEING SO RESTLESS THAT IT IS HARD TO SIT STILL: NOT AT ALL
3. WORRYING TOO MUCH ABOUT DIFFERENT THINGS: NOT AT ALL
GAD7 TOTAL SCORE: 2
IF YOU CHECKED OFF ANY PROBLEMS ON THIS QUESTIONNAIRE, HOW DIFFICULT HAVE THESE PROBLEMS MADE IT FOR YOU TO DO YOUR WORK, TAKE CARE OF THINGS AT HOME, OR GET ALONG WITH OTHER PEOPLE: SOMEWHAT DIFFICULT
2. NOT BEING ABLE TO STOP OR CONTROL WORRYING: SEVERAL DAYS
6. BECOMING EASILY ANNOYED OR IRRITABLE: NOT AT ALL
1. FEELING NERVOUS, ANXIOUS, OR ON EDGE: SEVERAL DAYS

## 2021-10-01 ASSESSMENT — PATIENT HEALTH QUESTIONNAIRE - PHQ9
SUM OF ALL RESPONSES TO PHQ QUESTIONS 1-9: 2
5. POOR APPETITE OR OVEREATING: NOT AT ALL

## 2021-10-01 ASSESSMENT — MIFFLIN-ST. JEOR: SCORE: 1506.25

## 2021-10-01 NOTE — PROGRESS NOTES
"17w5d feeling ok, better now.  She has felt movement since about 15 weeks, has been doing kick counts and earlier this week mvmt slowed significantly and she went and got a \"boutique\" us.  She was reassured but worried about the change in mvmt.  I reassured her that at this gestation mvmt can be very inconsistent, even for many days in a row and kick counts are not really a meaningful way to monitor well being.  I explained she can call us anytime she has concerns and we can get her in for HR check but she felt something could still be wrong if we weren't able to verifiy mvmt with just doptones.    Will do AFP today, she is scheduled for us in 2 weeks  jlp  "

## 2021-10-02 ASSESSMENT — ANXIETY QUESTIONNAIRES: GAD7 TOTAL SCORE: 2

## 2021-10-06 LAB
# FETUSES US: NORMAL
AFP MOM SERPL: 0.92
AFP SERPL-MCNC: 37 NG/ML
AGE - REPORTED: 32.7 YR
CURRENT SMOKER: NO
FAMILY MEMBER DISEASES HX: NO
GA METHOD: NORMAL
GA: NORMAL WK
IDDM PATIENT QL: NO
INTEGRATED SCN PATIENT-IMP: NORMAL
SPECIMEN DRAWN SERPL: NORMAL

## 2021-10-14 ENCOUNTER — ANCILLARY PROCEDURE (OUTPATIENT)
Dept: ULTRASOUND IMAGING | Facility: CLINIC | Age: 31
End: 2021-10-14
Attending: OBSTETRICS & GYNECOLOGY
Payer: COMMERCIAL

## 2021-10-14 ENCOUNTER — PRENATAL OFFICE VISIT (OUTPATIENT)
Dept: OBGYN | Facility: CLINIC | Age: 31
End: 2021-10-14
Attending: OBSTETRICS & GYNECOLOGY
Payer: COMMERCIAL

## 2021-10-14 VITALS
SYSTOLIC BLOOD PRESSURE: 113 MMHG | WEIGHT: 167.8 LBS | HEART RATE: 78 BPM | BODY MASS INDEX: 25.89 KG/M2 | DIASTOLIC BLOOD PRESSURE: 68 MMHG

## 2021-10-14 DIAGNOSIS — O34.219 PREVIOUS CESAREAN DELIVERY, ANTEPARTUM CONDITION OR COMPLICATION: Primary | ICD-10-CM

## 2021-10-14 DIAGNOSIS — Z34.80 SUPERVISION OF OTHER NORMAL PREGNANCY: ICD-10-CM

## 2021-10-14 PROCEDURE — 76805 OB US >/= 14 WKS SNGL FETUS: CPT | Performed by: OBSTETRICS & GYNECOLOGY

## 2021-10-14 PROCEDURE — 99207 PR PRENATAL VISIT: CPT | Performed by: OBSTETRICS & GYNECOLOGY

## 2021-10-14 NOTE — PROGRESS NOTES
19w4d feeling good, no c/o.  Feeling mvmt more consistently now and that is reassuring for her.  FAS today, prelim normal.  Did not find out gender. She is noticing increased anxiety.  Has struggled with this for a while, takes wellbutrin but feels that doesn't help with anxiety as well as she would like. Discussed therapy and she has not had success in the past.  Also discussed possibly adding or switching meds, ?selective serotonin reuptake inhibitor.  Brandi Yuniel manages this and so she will reach out to her about changing her meds.  We did discuss vistaril but she declines, worries it may make her too drowsy.  Will RTC 4.5 wks and do glucola.  dev

## 2021-10-23 NOTE — PROGRESS NOTES
"Mechelle is a 31 year old who is being evaluated via a billable video visit.      How would you like to obtain your AVS? MyChart  If the video visit is dropped, the invitation should be resent by: Text to cell phone: [phone]  Will anyone else be joining your video visit? No    Video Start Time: 11:41 AM    Assessment & Plan     (F41.9) Anxiety  (primary encounter diagnosis)  Comment:   Plan: escitalopram (LEXAPRO) 10 MG tablet        Suspect Wellbutrin might be causing anxiety with changing pharmacodynamics of pregnancy.  After discussing with several pharmacists, there are no good dose decreases that also cover all day.  Will try  mg once daily plus start Lexapro with taper up from 5-10 mg over two weeks    (F32.5) Major depressive disorder, single episode, in remission (H)  Comment:   Plan: Well controlled    (Z33.1) Pregnant state, incidental  Comment:   Plan:           Prescription drug management  48 minutes spent on the date of the encounter doing chart review, history and exam, documentation and further activities per the note         Patient Instructions   Increase meditation meliton use  Box breathing      Return in about 6 weeks (around 12/6/2021) for mental health check.    Gauri Brice, ISAIAS WRIGHT  [facility]    Subjective   Mechelle is a 31 year old who presents for the following health issues:    Influenza UTD 10/1/21  Covid Moderna vaccine #2 2/5/21  - therapist and eligible now for Moderna booster    HPI     Mechelle is 21 weeks pregnant (yay!)    Noted at most recent prenatal appt: \"She is noticing increased anxiety.  Has struggled with this for a while, takes wellbutrin but feels that doesn't help with anxiety as well as she would like. Discussed therapy and she has not had success in the past.  Also discussed possibly adding or switching meds, ?selective serotonin reuptake inhibitor.  Brandi Brice manages this and so she will reach out to her about changing her meds.  We did " "discuss vistaril but she declines, worries it may make her too drowsy\"    Hasn't really been on mediacation for anxiety.  Depression has been under control for the past 10 years.  No current depression symptoms or suicidal thoughts.    Starting 2-3 months ago 1-2 times a week - HR spiking, spirling, can't stop thinking.  No consistent content to spiraling thoughts.  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  Not seeing a therapist - needs help in the moment so doesn't think it will be helpful.  Talks to mother and .  Needs brand name Wellbutrin  Getting sleep - getting 9 hours of sleep - no mid-night waking  Exercising - three days a week 20-25 minutes  Has a pregnancy anxiety meditation meliton  Focus on exhales    Going on babymoon this week to [place] in [place]    Previous med trials  Prozac (fluoxetine) YES- as withdraw aid for cymbalta  Zoloft (sertraline) no  Celexa (citalopram) YES- not full response (for depression)  Lexapro (escitalopram) YES- not full response (for depression), \"almost worked\"  Paxil (paroxetine) no  Wellbutrin (bupropion) YES- current, needs to be brand name; monotherapy since 2020  Effexor (venlafaxine) YES- not full response  Others:  no  Cymbalta  Lamictal - started by psychiatry for treatment-resistant depression and weaned slowly starting in first pregnancy due to improved depression with full discontinuation spring 2020    Social History     Tobacco Use     Smoking status: Never Smoker     Smokeless tobacco: Never Used   Vaping Use     Vaping Use: Never used   Substance Use Topics     Alcohol use: Not Currently     Drug use: Never     LUCIO-7 SCORE 12/2/2019 12/30/2019 10/1/2021   Total Score 0 0 2     PHQ 12/1/2020 6/28/2021 10/1/2021   PHQ-9 Total Score 1 1 2   Q9: Thoughts of better off dead/self-harm past 2 weeks Not at all Not at all " Not at all       Review of Systems   Constitutional, HEENT, cardiovascular, pulmonary, gi and gu systems are negative, except as otherwise noted.      Objective           Vitals:  No vitals were obtained today due to virtual visit.    Physical Exam   GENERAL: Healthy, alert and no distress  EYES: Eyes grossly normal to inspection.  No discharge or erythema, or obvious scleral/conjunctival abnormalities.  RESP: No audible wheeze, cough, or visible cyanosis.  No visible retractions or increased work of breathing.    SKIN: Visible skin clear. No significant rash, abnormal pigmentation or lesions.  NEURO: Cranial nerves grossly intact.  Mentation and speech appropriate for age.  PSYCH: Mentation appears normal, affect normal/bright, judgement and insight intact, normal speech and appearance well-groomed.            Video-Visit Details    Type of service:  Video Visit    Video End Time:12:10 PM    Originating Location (pt. Location): Home    Distant Location (provider location):  [facility]     Platform used for Video Visit: Ridemakerz

## 2021-10-25 ENCOUNTER — VIRTUAL VISIT (OUTPATIENT)
Dept: FAMILY MEDICINE | Facility: CLINIC | Age: 31
End: 2021-10-25
Payer: COMMERCIAL

## 2021-10-25 DIAGNOSIS — F41.9 ANXIETY: Primary | ICD-10-CM

## 2021-10-25 DIAGNOSIS — Z33.1 PREGNANT STATE, INCIDENTAL: ICD-10-CM

## 2021-10-25 DIAGNOSIS — F32.5 MAJOR DEPRESSIVE DISORDER, SINGLE EPISODE, IN REMISSION (H): ICD-10-CM

## 2021-10-25 PROCEDURE — 99215 OFFICE O/P EST HI 40 MIN: CPT | Mod: 95 | Performed by: NURSE PRACTITIONER

## 2021-10-25 RX ORDER — ESCITALOPRAM OXALATE 10 MG/1
TABLET ORAL
Qty: 97 TABLET | Refills: 1 | Status: SHIPPED | OUTPATIENT
Start: 2021-10-25 | End: 2022-04-13

## 2021-10-25 NOTE — Clinical Note
Could another pharmacist touch base with me as Dennise is out?  This pregnant patient is having increased anxiety.  I think it may be the Wellbutrin dose and I'd like to decrease, but not discontinue.  What are the pros and cons of immediate release vs sustained release and considerations with dosing frequency.  Also, patient only takes brand name for the 150 XL and uses a coupon.  Would a coupon be available for the immediate release or sustained release?  Thanks!  KARLY Child

## 2021-10-27 ENCOUNTER — MYC MEDICAL ADVICE (OUTPATIENT)
Dept: FAMILY MEDICINE | Facility: CLINIC | Age: 31
End: 2021-10-27
Payer: COMMERCIAL

## 2021-10-27 DIAGNOSIS — F32.5 MAJOR DEPRESSIVE DISORDER, SINGLE EPISODE, IN REMISSION (H): Primary | ICD-10-CM

## 2021-10-28 NOTE — TELEPHONE ENCOUNTER
Brandi,    Please see MyChart.     Thanks,  CELE Weaver  [facility]

## 2021-10-29 RX ORDER — BUPROPION HCL 100 MG
100 TABLET,SUSTAINED-RELEASE 12 HR ORAL 2 TIMES DAILY
Qty: 90 TABLET | Refills: 1 | Status: ON HOLD | OUTPATIENT
Start: 2021-10-29 | End: 2022-03-01

## 2021-10-29 RX ORDER — BUPROPION HYDROCHLORIDE 100 MG/1
100 TABLET, EXTENDED RELEASE ORAL DAILY
Qty: 90 TABLET | Refills: 0 | Status: SHIPPED | OUTPATIENT
Start: 2021-10-29 | End: 2021-10-29

## 2021-11-01 ENCOUNTER — TELEPHONE (OUTPATIENT)
Dept: FAMILY MEDICINE | Facility: CLINIC | Age: 31
End: 2021-11-01

## 2021-11-01 DIAGNOSIS — F32.5 MAJOR DEPRESSIVE DISORDER, SINGLE EPISODE, IN REMISSION (H): ICD-10-CM

## 2021-11-01 RX ORDER — BUPROPION HYDROCHLORIDE 100 MG/1
100 TABLET, EXTENDED RELEASE ORAL 2 TIMES DAILY
Qty: 90 TABLET | Refills: 0 | Status: CANCELLED | OUTPATIENT
Start: 2021-11-01

## 2021-11-01 NOTE — TELEPHONE ENCOUNTER
Routed to PA group - please initiate a prior authorization for name brand WELLBUTRIN  MG 12 hr tablet with instructions Take 1 tablet (100 mg) by mouth 2 times daily.      Thank you,  CELE Keating  [facility]

## 2021-11-01 NOTE — TELEPHONE ENCOUNTER
Brandi,    Insurance will not cover original script.  Cued script they will cover.      Thanks,  CELE Weaver  [facility]

## 2021-11-01 NOTE — TELEPHONE ENCOUNTER
Patient needs PA for brand name.  She has an approved one for the  mg that should serve as basis for  mg.  Please alert patient and start the PA process.  KARLY Child

## 2021-11-02 NOTE — TELEPHONE ENCOUNTER
Central Prior Authorization Team   Phone: [phone]      PA Initiation    Medication: WELLBUTRIN  MG 12 hr tablet  Insurance Company: [employer] - Phone [phone] Fax [phone]  Pharmacy Filling the Rx: [id number] - [place], [place] - [address]  Filling Pharmacy Phone: [phone]  Filling Pharmacy Fax: [phone]  Start Date: 11/2/2021

## 2021-11-03 NOTE — TELEPHONE ENCOUNTER
Prior Authorization Approval    Authorization Effective Date: 10/3/2021  Authorization Expiration Date: 11/2/2022  Medication: WELLBUTRIN  MG 12 hr tablet-Approved   Approved Dose/Quantity:   Reference #: CASE ID [id number]   Insurance Company: [employer] - Phone [phone] Fax [phone]  Expected CoPay:       CoPay Card Available: No    Foundation Assistance Needed:    Which Pharmacy is filling the prescription (Not needed for infusion/clinic administered): [id number] - [place], [place] - [address]  Pharmacy Notified: Yes  Patient Notified: Yes

## 2021-11-16 NOTE — PROGRESS NOTES
24w3d  Doing fairly well recently.  No contractions, vaginal bleeding or leakage of fluid.  Recently saw Brandi Brice for mood follow-up.  She suspected Wellbutrin was adding to patient's anxiety, so first trying decreased dose of Wellbutrin and add Lexapro prn.  She already feels like decreased Wellbutrin dose is helping.    Reviewed TOLAC consent, as well as limitations re induction in the setting of previous .  Was induced for gestational hypertension last pregnancy and discussed typically we cannot induce labor after .  Will cont to discuss as pregnancy progresses.  Asking for refills of acne meds and ppx UTI antibiotics.  Provided.  GCT and hgb today, as well as TSH.  RTC 4w  Zuri Dumont MD

## 2021-11-17 ENCOUNTER — PRENATAL OFFICE VISIT (OUTPATIENT)
Dept: OBGYN | Facility: CLINIC | Age: 31
End: 2021-11-17
Payer: COMMERCIAL

## 2021-11-17 VITALS
OXYGEN SATURATION: 100 % | DIASTOLIC BLOOD PRESSURE: 73 MMHG | WEIGHT: 176 LBS | BODY MASS INDEX: 27.16 KG/M2 | HEART RATE: 69 BPM | SYSTOLIC BLOOD PRESSURE: 122 MMHG

## 2021-11-17 DIAGNOSIS — Z87.440 HISTORY OF UTI: ICD-10-CM

## 2021-11-17 DIAGNOSIS — L70.9 ACNE, UNSPECIFIED ACNE TYPE: ICD-10-CM

## 2021-11-17 DIAGNOSIS — E03.9 HYPOTHYROIDISM, UNSPECIFIED TYPE: ICD-10-CM

## 2021-11-17 DIAGNOSIS — F41.9 ANXIETY: ICD-10-CM

## 2021-11-17 DIAGNOSIS — Z34.82 ENCOUNTER FOR SUPERVISION OF OTHER NORMAL PREGNANCY IN SECOND TRIMESTER: Primary | ICD-10-CM

## 2021-11-17 LAB
GLUCOSE 1H P 50 G GLC PO SERPL-MCNC: 107 MG/DL (ref 70–129)
HGB BLD-MCNC: 12.8 G/DL (ref 11.7–15.7)

## 2021-11-17 PROCEDURE — 84443 ASSAY THYROID STIM HORMONE: CPT | Performed by: OBSTETRICS & GYNECOLOGY

## 2021-11-17 PROCEDURE — 36415 COLL VENOUS BLD VENIPUNCTURE: CPT | Performed by: OBSTETRICS & GYNECOLOGY

## 2021-11-17 PROCEDURE — 99207 PR PRENATAL VISIT: CPT | Performed by: OBSTETRICS & GYNECOLOGY

## 2021-11-17 PROCEDURE — 82950 GLUCOSE TEST: CPT | Performed by: OBSTETRICS & GYNECOLOGY

## 2021-11-17 RX ORDER — CLINDAMYCIN PHOSPHATE 10 MG/G
GEL TOPICAL 2 TIMES DAILY
Qty: 60 G | Refills: 3 | Status: SHIPPED | OUTPATIENT
Start: 2021-11-17 | End: 2022-07-22

## 2021-11-17 RX ORDER — AMOXICILLIN 250 MG/1
250 CAPSULE ORAL
Qty: 30 CAPSULE | Refills: 3 | Status: SHIPPED | OUTPATIENT
Start: 2021-11-17 | End: 2022-06-06

## 2021-11-18 LAB — TSH SERPL DL<=0.005 MIU/L-ACNC: 1.98 MU/L (ref 0.4–4)

## 2021-12-06 DIAGNOSIS — E03.9 HYPOTHYROIDISM, UNSPECIFIED TYPE: ICD-10-CM

## 2021-12-06 RX ORDER — LEVOTHYROXINE SODIUM 50 UG/1
50 TABLET ORAL DAILY
Qty: 90 TABLET | Refills: 3 | Status: CANCELLED | OUTPATIENT
Start: 2021-12-06

## 2021-12-13 ENCOUNTER — MYC MEDICAL ADVICE (OUTPATIENT)
Dept: FAMILY MEDICINE | Facility: CLINIC | Age: 31
End: 2021-12-13
Payer: COMMERCIAL

## 2021-12-13 NOTE — TELEPHONE ENCOUNTER
Reason for Call:  Medication or medication refill:     Do you use a [facility]?  Name of the pharmacy and phone number for the current request:  [facility][id number] - [place], [place] - [address]     Name of the medication requested: levothyroxine (SYNTHROID/LEVOTHROID) 50 MCG tablet     Other request: N/A     Can we leave a detailed message on this number? YES     Phone number patient can be reached at: Home number on file [phone] (home)     Best Time: Any

## 2021-12-17 ENCOUNTER — PRENATAL OFFICE VISIT (OUTPATIENT)
Dept: OBGYN | Facility: CLINIC | Age: 31
End: 2021-12-17
Payer: COMMERCIAL

## 2021-12-17 VITALS
WEIGHT: 184.3 LBS | BODY MASS INDEX: 27.93 KG/M2 | DIASTOLIC BLOOD PRESSURE: 76 MMHG | HEIGHT: 68 IN | HEART RATE: 75 BPM | SYSTOLIC BLOOD PRESSURE: 127 MMHG

## 2021-12-17 DIAGNOSIS — Z23 NEED FOR TDAP VACCINATION: Primary | ICD-10-CM

## 2021-12-17 DIAGNOSIS — Z34.83 ENCOUNTER FOR SUPERVISION OF OTHER NORMAL PREGNANCY IN THIRD TRIMESTER: ICD-10-CM

## 2021-12-17 PROCEDURE — 90471 IMMUNIZATION ADMIN: CPT | Performed by: OBSTETRICS & GYNECOLOGY

## 2021-12-17 PROCEDURE — 99207 PR PRENATAL VISIT: CPT | Performed by: OBSTETRICS & GYNECOLOGY

## 2021-12-17 PROCEDURE — 90715 TDAP VACCINE 7 YRS/> IM: CPT | Performed by: OBSTETRICS & GYNECOLOGY

## 2021-12-17 ASSESSMENT — MIFFLIN-ST. JEOR: SCORE: 1586.54

## 2021-12-17 NOTE — PROGRESS NOTES
Patient reports she is feeling some BH ctx's.  She had a sinus infection a couple of weeks ago.  She was treated with an antibiotic and her symptoms resolved.  She denies any vaginal bleeding, leaking fluid, headache, changes in vision, chest pain, chest pressure, shortness of breath, or edema.  Continues to take daily ASA 81mg.  Reviewed plan for TOLAC and limitations for induction of labor given history of .    S/p Tdap vaccine today.  Next visit on 21    Yesika Baker MD

## 2021-12-17 NOTE — PATIENT INSTRUCTIONS
Patient Education     Adapting to Pregnancy: Third Trimester    Although common during pregnancy, some discomforts may seem worse in the final weeks. Simple lifestyle changes can help. Take care of yourself. And ask your partner to help out with small tasks.  Limiting leg problems  Ways to combat leg issues:    Wear support hose all day.    Avoid snug shoes and clothes that bind, like tight pants and socks with elastic tops.    Sit with your feet and legs raised often.  Caring for your breasts  Tips to follow include:    Wash with plain water. Avoid using harsh soaps or rubbing alcohol. They may cause dryness.    Wear a nursing bra for extra support. It can also hide any leaks from your nipples.  Controlling hemorrhoids  Ways to avoid hemorrhoids include:    Eat foods that are high in fiber. Also, exercise and drink enough fluids. This will reduce constipation and hemorrhoids.    Sleep and nap on your side. This limits pressure on the veins of your rectum.    Try not to stand or sit for long periods.  Controlling back pain  As your body changes during pregnancy, your back must work in new ways. Back pain is due to many causes. Physical changes in your body can strain your back and its supporting muscles. Also, hormones (chemicals that carry messages throughout the body) increase during pregnancy. This can affect how your muscles and joints work together. All of these changes can lead to pain. Pain may be felt in the upper or lower back. Pain is also common in the pelvis. Some pregnant women have sciatica. This is pain caused by pressure on the sciatic nerve running down the back of the leg. Ask your healthcare provider for specific tips and exercises to help control your back pain.  Tips to help you rest  Good rest and sleep will help you feel better. Here are some ideas:    Ask your partner to massage your shoulders, neck, or back.    Limit the errands you do each day.    Lie down in the afternoon or after work  for a few minutes.    Take a warm bath before you go to sleep.    Drink warm milk or teas without caffeine.    Avoid coffee, black tea, and cola.  Stopping heartburn    Avoid spicy, greasy, fried, or acidic foods.    Eat small amounts more often. Eat slowly.   Wait 2 hours after eating before lying down.    Sleep with your upper body raised 6 inches.   Managing mood swings  Ways to manage mood swings include:    Know that mood changes are normal.    Exercise often, but get plenty of rest.    Address any concerns and limit stress. Talking to your partner, other women, or your healthcare provider may help.  Dealing with urinary frequency  Tips to deal with having to urinate often include:    Drink plenty of water all day. If you drink a lot in the evening, though, you may have to get up more in the night.    Limit coffee, black tea, and cola.  [employer] last reviewed this educational content on 2/1/2018 2000-2021 [employer]. All rights reserved. This information is not intended as a substitute for professional medical care. Always follow your healthcare professional's instructions.           Patient Education     Pregnancy: Your Third Trimester Changes  As the baby grows, your body changes too. You may also see signs that your body is getting ready for labor. Be patient. Within a few more weeks, your baby will be born.  How you are changing  Your body is preparing for the birth of your baby. Some of the most common changes are listed below. If you have any questions or concerns, ask your healthcare provider:    You ll gain more weight from fluids, extra blood, and fat deposits.    Your breasts will grow as your body gets ready to feed the baby. They may be more tender. You may also notice a slight yellow or white discharge from the nipples.    Discharge from your vagina may increase. This is normal.    You might see some skin color changes on your forehead, cheeks, or nose. Most of these will go away  after you deliver.  How your baby is growing       Month 7  Your baby can open and close his or her eyes and weighs around 4 pounds (1.8 kg). If born prematurely (too early), your baby would likely survive with special care. Month 8  Your baby is building up body fat and weighs around 6 pounds (2.7 kg). Month 9  Your baby weighs nearly 7 pounds (3.2 kg) and is about 19 to 21 inches long. In other words, any day now...   [employer] last reviewed this educational content on 2/1/2018 2000-2021 [employer]. All rights reserved. This information is not intended as a substitute for professional medical care. Always follow your healthcare professional's instructions.

## 2021-12-20 ENCOUNTER — NURSE TRIAGE (OUTPATIENT)
Dept: FAMILY MEDICINE | Facility: CLINIC | Age: 31
End: 2021-12-20
Payer: COMMERCIAL

## 2021-12-20 ENCOUNTER — MYC MEDICAL ADVICE (OUTPATIENT)
Dept: FAMILY MEDICINE | Facility: CLINIC | Age: 31
End: 2021-12-20
Payer: COMMERCIAL

## 2021-12-20 NOTE — TELEPHONE ENCOUNTER
Reason for Disposition    Painless lump at Tetanus-diphtheria (Td) injection site    Protocols used: IMMUNIZATION [id number]      TD OR TDAP VACCINATION LUMP:   * A painless lump (or nodule) sometimes develops at the Td or Tdap injection site 1 or 2 weeks later.  * It is harmless and usually will disappear in about 2 months.    TETANUS-DIPHTHERIA-PERTUSSIS (TDAP) VACCINE:  * Pain and tenderness at the injection site (in 66%).   * Mild redness and mild swelling at the injection site (in 20%).   * Fever over 100.4 F (38.0 C) (in 1 %) and fever over 102 F (38.9 C) (in 0.4%).  * Malaise, nausea, headache, body aches.    mychart sent by Pt, used protocol for reference.

## 2021-12-31 ENCOUNTER — PRENATAL OFFICE VISIT (OUTPATIENT)
Dept: OBGYN | Facility: CLINIC | Age: 31
End: 2021-12-31
Payer: COMMERCIAL

## 2021-12-31 VITALS
SYSTOLIC BLOOD PRESSURE: 123 MMHG | DIASTOLIC BLOOD PRESSURE: 68 MMHG | HEART RATE: 79 BPM | WEIGHT: 183.6 LBS | BODY MASS INDEX: 28.33 KG/M2

## 2021-12-31 DIAGNOSIS — O21.9 NAUSEA AND VOMITING DURING PREGNANCY: ICD-10-CM

## 2021-12-31 DIAGNOSIS — Z34.83 ENCOUNTER FOR SUPERVISION OF OTHER NORMAL PREGNANCY IN THIRD TRIMESTER: Primary | ICD-10-CM

## 2021-12-31 PROCEDURE — 59426 ANTEPARTUM CARE ONLY: CPT | Performed by: OBSTETRICS & GYNECOLOGY

## 2021-12-31 PROCEDURE — 99207 PR PRENATAL VISIT: CPT | Performed by: OBSTETRICS & GYNECOLOGY

## 2021-12-31 RX ORDER — METOCLOPRAMIDE 5 MG/1
5 TABLET ORAL
Qty: 60 TABLET | Refills: 1 | Status: SHIPPED | OUTPATIENT
Start: 2021-12-31 | End: 2022-04-13

## 2021-12-31 NOTE — PROGRESS NOTES
30w5d  Active fetal movement. [place] contractions, not painful. No bleeding or leaking fluid.   Nausea is back for past 3 days. No vomiting, no diarrhea, no sick contacts. Does not feel like heartburn. Zofran does not really help her. Will try reglan.  Thinks she had influenza last week, it was at her . Fatigued, body aches, bad cough. Feeling better from that now. COVID test was negative.   RTC 2 weeks, sooner PRN.  Zuri Cisneros MD

## 2022-01-13 ENCOUNTER — PRENATAL OFFICE VISIT (OUTPATIENT)
Dept: OBGYN | Facility: CLINIC | Age: 32
End: 2022-01-13
Payer: COMMERCIAL

## 2022-01-13 VITALS
HEART RATE: 70 BPM | WEIGHT: 184 LBS | DIASTOLIC BLOOD PRESSURE: 75 MMHG | BODY MASS INDEX: 28.39 KG/M2 | SYSTOLIC BLOOD PRESSURE: 117 MMHG | OXYGEN SATURATION: 100 %

## 2022-01-13 DIAGNOSIS — Z34.83 ENCOUNTER FOR SUPERVISION OF OTHER NORMAL PREGNANCY IN THIRD TRIMESTER: Primary | ICD-10-CM

## 2022-01-13 PROCEDURE — 99207 PR PRENATAL VISIT: CPT | Performed by: OBSTETRICS & GYNECOLOGY

## 2022-01-13 NOTE — PROGRESS NOTES
32w4d  Doing well since last visit, other than normal pregnancies discomforts.  Has maternity support belt at home, but hasn't helped too much this pregnancy.  Isn't as puffy this time around as she was at this point in last pregnancy.  She's hoping that's a good thing in terms of her BP issues from last time repeating themselves.  Is taking baby ASA.  No contractions, vaginal bleeding or leakage of fluid.  +FM  RTC 2w.  Zuri Dumont MD

## 2022-01-27 ENCOUNTER — PRENATAL OFFICE VISIT (OUTPATIENT)
Dept: OBGYN | Facility: CLINIC | Age: 32
End: 2022-01-27
Payer: COMMERCIAL

## 2022-01-27 VITALS
WEIGHT: 189.4 LBS | OXYGEN SATURATION: 100 % | TEMPERATURE: 98.1 F | DIASTOLIC BLOOD PRESSURE: 79 MMHG | HEART RATE: 77 BPM | BODY MASS INDEX: 29.23 KG/M2 | SYSTOLIC BLOOD PRESSURE: 127 MMHG

## 2022-01-27 DIAGNOSIS — Z34.83 ENCOUNTER FOR SUPERVISION OF OTHER NORMAL PREGNANCY IN THIRD TRIMESTER: Primary | ICD-10-CM

## 2022-01-27 PROCEDURE — 99207 PR PRENATAL VISIT: CPT | Performed by: OBSTETRICS & GYNECOLOGY

## 2022-02-10 ENCOUNTER — PRENATAL OFFICE VISIT (OUTPATIENT)
Dept: OBGYN | Facility: CLINIC | Age: 32
End: 2022-02-10
Payer: COMMERCIAL

## 2022-02-10 VITALS
DIASTOLIC BLOOD PRESSURE: 82 MMHG | OXYGEN SATURATION: 99 % | SYSTOLIC BLOOD PRESSURE: 120 MMHG | HEART RATE: 77 BPM | WEIGHT: 191 LBS | BODY MASS INDEX: 29.47 KG/M2

## 2022-02-10 DIAGNOSIS — E03.9 HYPOTHYROIDISM, UNSPECIFIED TYPE: ICD-10-CM

## 2022-02-10 DIAGNOSIS — Z34.83 ENCOUNTER FOR SUPERVISION OF OTHER NORMAL PREGNANCY IN THIRD TRIMESTER: Primary | ICD-10-CM

## 2022-02-10 LAB — HGB BLD-MCNC: 13.8 G/DL (ref 11.7–15.7)

## 2022-02-10 PROCEDURE — 84443 ASSAY THYROID STIM HORMONE: CPT | Performed by: OBSTETRICS & GYNECOLOGY

## 2022-02-10 PROCEDURE — 36415 COLL VENOUS BLD VENIPUNCTURE: CPT | Performed by: OBSTETRICS & GYNECOLOGY

## 2022-02-10 PROCEDURE — 99207 PR PRENATAL VISIT: CPT | Performed by: OBSTETRICS & GYNECOLOGY

## 2022-02-10 PROCEDURE — 87653 STREP B DNA AMP PROBE: CPT | Performed by: OBSTETRICS & GYNECOLOGY

## 2022-02-10 NOTE — PROGRESS NOTES
36w4d feeling ok, no c/o.  Some bh ctx.  Good fm. BSUS: cephalic.  GBS done.  She declines cervical exam.  RTC weekly  mirnap

## 2022-02-12 LAB
GP B STREP DNA SPEC QL NAA+PROBE: POSITIVE
PATIENT PENICILLIN, AMOXICILLIN, CEPHALOSPORINS ALLERGY: NO

## 2022-02-13 LAB — TSH SERPL DL<=0.005 MIU/L-ACNC: 2.64 MU/L (ref 0.4–4)

## 2022-02-17 ENCOUNTER — PRENATAL OFFICE VISIT (OUTPATIENT)
Dept: OBGYN | Facility: CLINIC | Age: 32
End: 2022-02-17
Payer: COMMERCIAL

## 2022-02-17 VITALS
BODY MASS INDEX: 29.87 KG/M2 | WEIGHT: 193.6 LBS | OXYGEN SATURATION: 100 % | DIASTOLIC BLOOD PRESSURE: 78 MMHG | SYSTOLIC BLOOD PRESSURE: 119 MMHG | TEMPERATURE: 98.1 F | HEART RATE: 79 BPM

## 2022-02-17 DIAGNOSIS — Z34.83 ENCOUNTER FOR SUPERVISION OF OTHER NORMAL PREGNANCY IN THIRD TRIMESTER: Primary | ICD-10-CM

## 2022-02-17 PROCEDURE — 99207 PR PRENATAL VISIT: CPT | Performed by: OBSTETRICS & GYNECOLOGY

## 2022-02-17 RX ORDER — ONDANSETRON 2 MG/ML
4 INJECTION INTRAMUSCULAR; INTRAVENOUS EVERY 6 HOURS PRN
Status: CANCELLED | OUTPATIENT
Start: 2022-02-17

## 2022-02-17 RX ORDER — NALOXONE HYDROCHLORIDE 0.4 MG/ML
0.2 INJECTION, SOLUTION INTRAMUSCULAR; INTRAVENOUS; SUBCUTANEOUS
Status: CANCELLED | OUTPATIENT
Start: 2022-02-17

## 2022-02-17 RX ORDER — OXYTOCIN 10 [USP'U]/ML
10 INJECTION, SOLUTION INTRAMUSCULAR; INTRAVENOUS
Status: CANCELLED | OUTPATIENT
Start: 2022-02-17

## 2022-02-17 RX ORDER — KETOROLAC TROMETHAMINE 30 MG/ML
30 INJECTION, SOLUTION INTRAMUSCULAR; INTRAVENOUS
Status: CANCELLED | OUTPATIENT
Start: 2022-02-17 | End: 2022-02-22

## 2022-02-17 RX ORDER — METOCLOPRAMIDE HYDROCHLORIDE 5 MG/ML
10 INJECTION INTRAMUSCULAR; INTRAVENOUS EVERY 6 HOURS PRN
Status: CANCELLED | OUTPATIENT
Start: 2022-02-17

## 2022-02-17 RX ORDER — TRANEXAMIC ACID 10 MG/ML
1 INJECTION, SOLUTION INTRAVENOUS EVERY 30 MIN PRN
Status: CANCELLED | OUTPATIENT
Start: 2022-02-17

## 2022-02-17 RX ORDER — OXYTOCIN/0.9 % SODIUM CHLORIDE 30/500 ML
100-340 PLASTIC BAG, INJECTION (ML) INTRAVENOUS CONTINUOUS PRN
Status: CANCELLED | OUTPATIENT
Start: 2022-02-17

## 2022-02-17 RX ORDER — PROCHLORPERAZINE MALEATE 10 MG
10 TABLET ORAL EVERY 6 HOURS PRN
Status: CANCELLED | OUTPATIENT
Start: 2022-02-17

## 2022-02-17 RX ORDER — PENICILLIN G 3000000 [IU]/50ML
3 INJECTION, SOLUTION INTRAVENOUS EVERY 4 HOURS
Status: CANCELLED | OUTPATIENT
Start: 2022-02-17

## 2022-02-17 RX ORDER — METHYLERGONOVINE MALEATE 0.2 MG/ML
200 INJECTION INTRAVENOUS
Status: CANCELLED | OUTPATIENT
Start: 2022-02-17

## 2022-02-17 RX ORDER — NALOXONE HYDROCHLORIDE 0.4 MG/ML
0.4 INJECTION, SOLUTION INTRAMUSCULAR; INTRAVENOUS; SUBCUTANEOUS
Status: CANCELLED | OUTPATIENT
Start: 2022-02-17

## 2022-02-17 RX ORDER — ACETAMINOPHEN 325 MG/1
650 TABLET ORAL EVERY 4 HOURS PRN
Status: CANCELLED | OUTPATIENT
Start: 2022-02-17

## 2022-02-17 RX ORDER — ONDANSETRON 4 MG/1
4 TABLET, ORALLY DISINTEGRATING ORAL EVERY 6 HOURS PRN
Status: CANCELLED | OUTPATIENT
Start: 2022-02-17

## 2022-02-17 RX ORDER — IBUPROFEN 600 MG/1
600 TABLET, FILM COATED ORAL
Status: CANCELLED | OUTPATIENT
Start: 2022-02-17 | End: 2022-02-22

## 2022-02-17 RX ORDER — MISOPROSTOL 200 UG/1
400 TABLET ORAL
Status: CANCELLED | OUTPATIENT
Start: 2022-02-17

## 2022-02-17 RX ORDER — PROCHLORPERAZINE 25 MG
25 SUPPOSITORY, RECTAL RECTAL EVERY 12 HOURS PRN
Status: CANCELLED | OUTPATIENT
Start: 2022-02-17

## 2022-02-17 RX ORDER — METOCLOPRAMIDE 10 MG/1
10 TABLET ORAL EVERY 6 HOURS PRN
Status: CANCELLED | OUTPATIENT
Start: 2022-02-17

## 2022-02-17 RX ORDER — FENTANYL CITRATE 50 UG/ML
50-100 INJECTION, SOLUTION INTRAMUSCULAR; INTRAVENOUS
Status: CANCELLED | OUTPATIENT
Start: 2022-02-17

## 2022-02-17 RX ORDER — CARBOPROST TROMETHAMINE 250 UG/ML
250 INJECTION, SOLUTION INTRAMUSCULAR
Status: CANCELLED | OUTPATIENT
Start: 2022-02-17

## 2022-02-17 RX ORDER — PENICILLIN G POTASSIUM 5000000 [IU]/1
5 INJECTION, POWDER, FOR SOLUTION INTRAMUSCULAR; INTRAVENOUS ONCE
Status: CANCELLED | OUTPATIENT
Start: 2022-02-17 | End: 2022-02-17

## 2022-02-17 RX ORDER — MISOPROSTOL 200 UG/1
800 TABLET ORAL
Status: CANCELLED | OUTPATIENT
Start: 2022-02-17

## 2022-02-17 RX ORDER — OXYTOCIN/0.9 % SODIUM CHLORIDE 30/500 ML
340 PLASTIC BAG, INJECTION (ML) INTRAVENOUS CONTINUOUS PRN
Status: CANCELLED | OUTPATIENT
Start: 2022-02-17

## 2022-02-17 NOTE — PROGRESS NOTES
DANNY Visit at 37w4d     S: feeling more discomforts of pregnancy, having a hard time sleeping. Occasional painful contractions but nothing consistent yet. Denies VB or LOF. Active FM.     O: /78   Pulse 79   Temp 98.1  F (36.7  C)   Wt 87.8 kg (193 lb 9.6 oz)   LMP 05/30/2021   SpO2 100%   BMI 29.87 kg/m       GBS: Positive  Hemoglobin   Date Value Ref Range Status   02/10/2022 13.8 11.7 - 15.7 g/dL Final   11/20/2019 12.1 11.7 - 15.7 g/dL Final   ]    Breast pump rx: script done   Labor orders: signed and held  Birth plan: Prefers minimal interventions, capped IV, aware of need for PCN due to GBS positive and for continuous EFM due to TOLAC but would like to maintain mobility in labor.   Length of stay: discussed  Disability paperwork: Completed  Resident involvement: discussed and declines while in labor, ok with resident to assist for CS  Discharge meds done : does not need     RTC in 1 week     Christiana Robles MD

## 2022-02-24 ENCOUNTER — PRENATAL OFFICE VISIT (OUTPATIENT)
Dept: OBGYN | Facility: CLINIC | Age: 32
End: 2022-02-24
Payer: COMMERCIAL

## 2022-02-24 VITALS
OXYGEN SATURATION: 100 % | DIASTOLIC BLOOD PRESSURE: 85 MMHG | HEART RATE: 80 BPM | SYSTOLIC BLOOD PRESSURE: 135 MMHG | WEIGHT: 196.6 LBS | BODY MASS INDEX: 30.34 KG/M2

## 2022-02-24 DIAGNOSIS — O34.219 PREVIOUS CESAREAN DELIVERY, ANTEPARTUM CONDITION OR COMPLICATION: Primary | ICD-10-CM

## 2022-02-24 PROCEDURE — 99207 PR PRENATAL VISIT: CPT | Performed by: OBSTETRICS & GYNECOLOGY

## 2022-02-24 PROCEDURE — 59425 ANTEPARTUM CARE ONLY: CPT | Performed by: OBSTETRICS & GYNECOLOGY

## 2022-02-24 NOTE — PROGRESS NOTES
38w4d overall feeling ok, some ctx on and off yesterday.  Good fm.  Normal labor questions.  RTC weekly  dev

## 2022-02-25 ENCOUNTER — HOSPITAL ENCOUNTER (OUTPATIENT)
Facility: CLINIC | Age: 32
Discharge: HOME | End: 2022-02-26
Attending: OBSTETRICS & GYNECOLOGY | Admitting: OBSTETRICS & GYNECOLOGY
Payer: COMMERCIAL

## 2022-02-25 ENCOUNTER — NURSE TRIAGE (OUTPATIENT)
Dept: NURSING | Facility: CLINIC | Age: 32
End: 2022-02-25
Payer: COMMERCIAL

## 2022-02-25 PROBLEM — O47.9 UTERINE CONTRACTIONS DURING PREGNANCY: Status: ACTIVE | Noted: 2022-02-25

## 2022-02-25 LAB
ABO/RH(D): NORMAL
ANTIBODY SCREEN: NEGATIVE
SPECIMEN EXPIRATION DATE: NORMAL

## 2022-02-25 PROCEDURE — 84156 ASSAY OF PROTEIN URINE: CPT | Performed by: OBSTETRICS & GYNECOLOGY

## 2022-02-25 PROCEDURE — G0463 HOSPITAL OUTPT CLINIC VISIT: HCPCS

## 2022-02-25 RX ORDER — ACETAMINOPHEN 325 MG/1
975 TABLET ORAL EVERY 8 HOURS PRN
Status: DISCONTINUED | OUTPATIENT
Start: 2022-02-25 | End: 2022-02-26 | Stop reason: HOSPADM

## 2022-02-26 VITALS
BODY MASS INDEX: 29.7 KG/M2 | HEIGHT: 68 IN | WEIGHT: 196 LBS | SYSTOLIC BLOOD PRESSURE: 124 MMHG | DIASTOLIC BLOOD PRESSURE: 74 MMHG | RESPIRATION RATE: 18 BRPM | TEMPERATURE: 98.2 F

## 2022-02-26 LAB
ALT SERPL W P-5'-P-CCNC: 17 U/L (ref 0–50)
AST SERPL W P-5'-P-CCNC: 21 U/L (ref 0–45)
CREAT SERPL-MCNC: 0.69 MG/DL (ref 0.52–1.04)
CREAT UR-MCNC: 23 MG/DL
ERYTHROCYTE [DISTWIDTH] IN BLOOD BY AUTOMATED COUNT: 13.5 % (ref 10–15)
GFR SERPL CREATININE-BSD FRML MDRD: >90 ML/MIN/1.73M2
HCT VFR BLD AUTO: 40 % (ref 35–47)
HGB BLD-MCNC: 13.5 G/DL (ref 11.7–15.7)
MCH RBC QN AUTO: 31.7 PG (ref 26.5–33)
MCHC RBC AUTO-ENTMCNC: 33.8 G/DL (ref 31.5–36.5)
MCV RBC AUTO: 94 FL (ref 78–100)
PLATELET # BLD AUTO: 162 10E3/UL (ref 150–450)
PROT UR-MCNC: <0.05 G/L
PROT/CREAT 24H UR: NORMAL MG/G{CREAT}
RBC # BLD AUTO: 4.26 10E6/UL (ref 3.8–5.2)
T PALLIDUM AB SER QL: NONREACTIVE
WBC # BLD AUTO: 10 10E3/UL (ref 4–11)

## 2022-02-26 PROCEDURE — 59025 FETAL NON-STRESS TEST: CPT | Mod: 26 | Performed by: OBSTETRICS & GYNECOLOGY

## 2022-02-26 PROCEDURE — 86780 TREPONEMA PALLIDUM: CPT | Performed by: OBSTETRICS & GYNECOLOGY

## 2022-02-26 PROCEDURE — G0463 HOSPITAL OUTPT CLINIC VISIT: HCPCS

## 2022-02-26 PROCEDURE — 99213 OFFICE O/P EST LOW 20 MIN: CPT | Mod: 25 | Performed by: OBSTETRICS & GYNECOLOGY

## 2022-02-26 PROCEDURE — 84450 TRANSFERASE (AST) (SGOT): CPT | Performed by: OBSTETRICS & GYNECOLOGY

## 2022-02-26 PROCEDURE — 36415 COLL VENOUS BLD VENIPUNCTURE: CPT | Performed by: OBSTETRICS & GYNECOLOGY

## 2022-02-26 PROCEDURE — 82565 ASSAY OF CREATININE: CPT | Performed by: OBSTETRICS & GYNECOLOGY

## 2022-02-26 PROCEDURE — 84460 ALANINE AMINO (ALT) (SGPT): CPT | Performed by: OBSTETRICS & GYNECOLOGY

## 2022-02-26 PROCEDURE — 86901 BLOOD TYPING SEROLOGIC RH(D): CPT | Performed by: OBSTETRICS & GYNECOLOGY

## 2022-02-26 PROCEDURE — 85027 COMPLETE CBC AUTOMATED: CPT | Performed by: OBSTETRICS & GYNECOLOGY

## 2022-02-26 NOTE — PROGRESS NOTES
Mechelle arrived to L and D with SO for rule out labor. She is a  38.5 GA. Pt had contractions today stated that they got stronger around 2100. No vaginal bleeding, leaking of fluid. HA, blurry vision N/V and RUQ pain. Subsequent Bps normotensive. Pre-e labs and urine completed. Julia PARNELL reviewed with pt at bedside along with EFM. See FS for EFM interpretation. MD discussed early and active labor along with management of early labor and when to return to hospital. MD set up clinic apt for 8:30 AM on . Discharge instructions reviewed and ambulatory off unit at 0121.

## 2022-02-26 NOTE — TELEPHONE ENCOUNTER
OB Triage Call      Is patient's OB/Midwife with the [facility] or [facility]? [facility]- Proceed with triage     Reason for call: Patient calling due to increase in contractions.    Assessment:  39W5d reports onset of contractions at 1230.  She reports contractions have been <10 minutes apart for an hour.  She reports the last 30 minutes, contractions have been 5 minutes apart lasting for 1 minute.  Patient denies bleeding or leaking of fluid.  She denies any constant abdominal pain.    Plan: Go to L&D    Patient plans to deliver at [facility]    Patient's primary OB Provider is Dr Cisneros.      Per protocol recommendations Patient to be evaluated in L&D. Patient's primary OB is [facility].  Labor and delivery at [facility] ([phone]) notified of patient's pending arrival.  Report given to Zahra.    Is patient's delivering hospital on divert? No      38w5d    Estimated Date of Delivery: Mar 6, 2022        OB History    Para Term  AB Living   2 1 1 0 0 1   SAB IAB Ectopic Multiple Live Births   0 0 0 0 1      # Outcome Date GA Lbr [place]/2nd Weight Sex Delivery Anes PTL Lv   2 Current            1 Term 19 37w3d  2.722 kg (6 lb) F CS-LTranv  N LUKE      Complications: Fetal Intolerance, GBS      Name: KANDICE MERAZ-JAYDEN      Apgar1: 7  Apgar5: 9       Lab Results   Component Value Date    GBS Positive (A) 2019          Eleni Doyle RN 22 9:59 PM  [facility] Nurse Advisor    Reason for Disposition    [1] History of prior delivery (multipara) AND [2] contractions < 10 minutes apart AND [3] present 1 hour    Additional Information    Negative: Passed out (i.e., lost consciousness, collapsed and was not responding)    Negative: Shock suspected (e.g., cold/pale/clammy skin, too weak to stand, low BP, rapid pulse)    Negative: Difficult to awaken or acting confused (e.g., disoriented, slurred speech)    Negative: [1] SEVERE abdominal pain (e.g., excruciating) AND  "[2] constant AND [3] present > 1 hour    Negative: Severe bleeding (e.g., continuous red blood from vagina, or large blood clots)    Negative: Umbilical cord hanging out of the vagina (shiny, white, curled appearance, \"like telephone cord\")    Negative: Uncontrollable urge to push (i.e., feels like baby is coming out now)    Negative: Can see baby    Negative: Sounds like a life-threatening emergency to the triager    Negative: Pregnant < 37 weeks (i.e., )    Negative: [1] Uncertain delivery date AND [2] possibly pregnant < 37 weeks (i.e., )    Negative: [1] First baby (primipara) AND [2] contractions < 6 minutes apart  AND [3] present 2 hours    Negative: [1] History of rapid prior delivery AND [2] contractions < 10 minutes apart    Negative: [1] Leakage of fluid from vagina AND [2] green or brown in color    Negative: [1] Leakage of fluid from vagina AND [2] leakage started > 4 hours ago    Negative: Vaginal bleeding or spotting    Negative: Baby moving less today (e.g., kick count < 5 in 1 hour or < 10 in 2 hours)    Negative: Severe headache or headache that won't go away    Negative: New blurred vision or vision changes    Negative: MODERATE-SEVERE abdominal pain    Negative: [1] MILD abdominal pain (e.g., doesn't interfere with normal activities) AND [2] constant AND [3] present > 2 hours    Negative: Fever > 100.4 F (38.0 C)    Negative: [1] Leakage of fluid from vagina AND [2] leakage started < 4 hours ago    Negative: Patient sounds very sick or weak to the triager    Protocols used: PREGNANCY - LABOR-A-AH      "

## 2022-02-26 NOTE — PROGRESS NOTES
Patient reports contractions went away for a bit, but came back with less intensity than before.  No new symptoms.    Vitals:    02/25/22 2309 02/25/22 2325 02/25/22 2349 02/26/22 0033   BP: (!) 141/82 130/86 124/71 124/74   Resp: 18      Temp: 98.2  F (36.8  C)      TempSrc: Oral      Weight:       Height:         Sitting comfortably in bed.  Does not react when contractions trace on toco.  Abd soft, non-tender    NST remains reactive, cat 1.  110-120/mod/+ accels, no decels.    Declines repeat SVE    Component      Latest Ref Rng & Units 2/25/2022 2/26/2022   WBC      4.0 - 11.0 10e3/uL  10.0   RBC Count      3.80 - 5.20 10e6/uL  4.26   Hemoglobin      11.7 - 15.7 g/dL  13.5   Hematocrit      35.0 - 47.0 %  40.0   MCV      78 - 100 fL  94   MCH      26.5 - 33.0 pg  31.7   MCHC      31.5 - 36.5 g/dL  33.8   RDW      10.0 - 15.0 %  13.5   Platelet Count      150 - 450 10e3/uL  162   Protein Random Urine      g/L <0.05    Protein Total Urine g/gr Creatinine           Creatinine Urine      mg/dL 23    Creatinine      0.52 - 1.04 mg/dL  0.69   GFR Estimate      >60 mL/min/1.73m2  >90   AST      0 - 45 U/L  21   ALT      0 - 50 U/L  17       Discussed normal labs and subsequent BP all normal.  Does not meet diagnostic criteria for hypertensive disorder in pregnancy as no elevated BP as outpatient, as well as normal labs.  Declined repeat SVE.  Comfortable with going home.  Discussed return precautions.    Patient was scheduled for office visit on Monday, 2/28 at 0830.    Zuri Dumont MD

## 2022-02-26 NOTE — H&P
[facility]    History and Physical  Obstetrics and Gynecology     Date of Admission:  2022    Assessment & Plan   Mechelle Mata is a 31 year old female who presents with contractions  ASSESSMENT:   IUP @ 38w5d not in labor.  NST reactive.  Category  I    PLAN:   Cervix remains closed, same as SVE yesterday.  Not in labor at this point.  Mild range BP on arrival with history of gestational hypertension.  Plan serial BP, HELLP labs, UPC.  Will also collect CBC, T&S given history of , potential for admission tonight.  Further plan to be determined when lab results back.    Zuri Dumont       History of Present Illness   Mechelle Mata is a 31 year old female  38w5d  Estimated Date of Delivery: 3/6/22 is calculated from Patient's last menstrual period was 2021. [facility].    PRENATAL COURSE  Prenatal course was complicated by:  -History of PLTCS for cat 2 RFD  -History of gestational hypertension  -GBS pos      Recent Labs   Lab Test 21  1335 19  1117   ABO  --  A   RH  --  Pos   AS Negative Neg     Rhogam not indicated   Recent Labs   Lab Test 21  1335 19  1637   HEPBANG Nonreactive  --    HIAGAB Nonreactive  --    GBS  --  Positive*   RUQIGG Positive*  --        Past Medical History    I have reviewed this patient's medical history and updated it with pertinent information if needed.   Past Medical History:   Diagnosis Date     Depressive disorder      Thyroid disease      Uncomplicated asthma        Past Surgical History   I have reviewed this patient's surgical history and updated it with pertinent information if needed.  Past Surgical History:   Procedure Laterality Date      SECTION N/A 2019    Procedure:  SECTION;  Surgeon: Zuri Cisneros MD;  Location: [facility] L[facility]     wisdom teeth       ZZC EXCISION OF GANGLION CYST FOREARM Left 2006    wrist        Prior to Admission Medications   Prior to Admission Medications   Prescriptions Last Dose Informant Patient Reported? Taking?   Prenatal Vit-Fe Fumarate-FA (PRENATAL PO) 2/25/2022 at Unknown time  Yes Yes   WELLBUTRIN  MG 12 hr tablet 2/25/2022 at Unknown time  No Yes   Sig: Take 1 tablet (100 mg) by mouth 2 times daily   WELLBUTRIN  MG 24 hr tablet   No No   Sig: Take 1 tablet (150 mg) by mouth every morning   albuterol (PROVENTIL HFA) 108 (90 Base) MCG/ACT inhaler More than a month at Unknown time  No Yes   Sig: Inhale 2 puffs into the lungs every 6 hours as needed for shortness of breath / dyspnea or wheezing   amoxicillin (AMOXIL) 250 MG capsule   No No   Sig: Take 1 capsule (250 mg) by mouth once as needed (after coitus)   clindamycin (CLINDAMAX) 1 % external gel   No No   Sig: Apply topically 2 times daily   docusate sodium (COLACE) 50 MG capsule   Yes No   Sig: Take 50 mg by mouth 2 times daily   doxylamine (UNISOM) 25 MG TABS tablet 2/24/2022 at Unknown time  Yes Yes   Sig: Take 25 mg by mouth At Bedtime   escitalopram (LEXAPRO) 10 MG tablet   No No   Sig: Take 0.5 tablets (5 mg) by mouth daily for 14 days, THEN 1 tablet (10 mg) daily.   levothyroxine (SYNTHROID/LEVOTHROID) 50 MCG tablet 2/25/2022 at Unknown time  No Yes   Sig: Take 1 tablet (50 mcg) by mouth daily   metoclopramide (REGLAN) 5 MG tablet   No No   Sig: Take 1 tablet (5 mg) by mouth 4 times daily (before meals and nightly)   pyridOXINE (VITAMIN  B-6) 25 MG tablet   Yes No   Sig: Take 25 mg by mouth daily       Facility-Administered Medications: None     Allergies   No Known Allergies    Social History   I have reviewed this patient's social history and updated it with pertinent information if needed. Mechelle Mata  reports that she has never smoked. She has never used smokeless tobacco. She reports previous alcohol use. She reports that she does not use drugs.    Family History   I have reviewed this patient's family history  "and updated it with pertinent information if needed.   Family History   Problem Relation Age of Onset     Mental Illness Mother      Depression Mother      Heart Defect Mother         \"mini-stroke\" 2/2 hole in her heart     Mental Illness Father      Depression Father      Heart Failure Paternal Grandfather        Immunization History   Immunization History   Administered Date(s) Administered     COVID-19,PF,Moderna 01/08/2021, 02/05/2021, 11/05/2021     DT (PEDS <7y) 01/01/2001     DTAP (<7y) 1989, 1989, 1989, 04/18/1991, 06/27/1994     DTaP, Unspecified 04/18/1991, 08/30/2011     Flu, Unspecified 12/03/2018     HPV Quadrivalent 08/10/2007, 07/01/2010, 08/30/2011     Hep B, Peds or Adolescent 03/21/2001, 05/02/2001, 11/13/2011     HepA-ped 2 Dose 05/11/2007, 08/30/2011     HepB 11/13/2001     Influenza Vaccine IM > 6 months Valent IIV4 (Alfuria,Fluzone) 10/01/2021     Influenza Vaccine, 6+MO IM (QUADRIVALENT W/PRESERVATIVES) 10/02/2019, 11/01/2020     MMR 11/30/1990, 03/21/2001, 11/22/2019     Meningococcal (Menveo ) 08/10/2007     OPV, unspecified 04/18/1991     Poliovirus, inactivated (IPV) 1989, 1989, 04/18/1991, 06/27/1994     TDAP Vaccine (Adacel) 09/23/2019     Tdap (Adacel,Boostrix) 12/17/2021     Typhoid IM 05/11/2007       Physical Exam   Temp: 98.2  F (36.8  C) Temp src: Oral BP: (!) 141/82     Resp: 18        Vital Signs with Ranges  Temp:  [98.2  F (36.8  C)] 98.2  F (36.8  C)  Resp:  [18] 18  BP: (141-157)/(82-98) 141/82    Abdomen: gravid, single vertex fetus, non-tender  Cervical Exam: closed/0/-2, soft and posterior     Fetal Heart Tones: 120 baseline, moderate variablility, + accels, no decels and Category I  TOCO:   frequency q 3-9 minutes    Constitutional: healthy, alert, active and no distress   Respiratory: no increased work of breathing  Cardiovascular: normal apical pulses   Skin/Extremites: no bruising or bleeding, normal skin color, texture, turgor and no " redness, warmth, or swelling  Neurologic: Awake, alert, oriented   Neuropsychiatric: appropriate mood and affect

## 2022-02-26 NOTE — PROGRESS NOTES
Pt arrived to triage for r/o labor. Has been angela since 1200 today. Around [id number] pt states contractions started coming every 3 minutes and since then she has been breathing through them. She wishes to TOLAC. Yesterday's SVE in clinic showed her cervix was closed. History of asthma. VS taken with BP elevated. EFM applied. Provider notified of pt arrival. Report given to Linda MCCRAY RN and Bettie OAKLEY.

## 2022-02-26 NOTE — DISCHARGE INSTRUCTIONS
You have an appointment at the clinic at 8:30 AM on 2/28/2022. Please also keep your scheduled Thursday appointment. If you have any questions please feel free to call the clinic hotline or present to Labor and Delivery.

## 2022-02-27 ENCOUNTER — HOSPITAL ENCOUNTER (INPATIENT)
Facility: CLINIC | Age: 32
LOS: 3 days | Discharge: HOME | End: 2022-03-02
Attending: OBSTETRICS & GYNECOLOGY | Admitting: OBSTETRICS & GYNECOLOGY
Payer: COMMERCIAL

## 2022-02-27 ENCOUNTER — NURSE TRIAGE (OUTPATIENT)
Dept: NURSING | Facility: CLINIC | Age: 32
End: 2022-02-27
Payer: COMMERCIAL

## 2022-02-27 DIAGNOSIS — Z98.891 S/P CESAREAN SECTION: ICD-10-CM

## 2022-02-27 DIAGNOSIS — F32.5 MAJOR DEPRESSIVE DISORDER, SINGLE EPISODE, IN REMISSION (H): ICD-10-CM

## 2022-02-27 DIAGNOSIS — Z34.03 ENCOUNTER FOR SUPERVISION OF NORMAL FIRST PREGNANCY IN THIRD TRIMESTER: ICD-10-CM

## 2022-02-27 DIAGNOSIS — O34.219 VBAC (VAGINAL BIRTH AFTER CESAREAN): Primary | ICD-10-CM

## 2022-02-27 PROBLEM — Z36.89 ENCOUNTER FOR TRIAGE IN PREGNANT PATIENT: Status: ACTIVE | Noted: 2022-02-27

## 2022-02-27 LAB — SARS-COV-2 RNA RESP QL NAA+PROBE: NEGATIVE

## 2022-02-27 PROCEDURE — 258N000003 HC RX IP 258 OP 636: Performed by: OBSTETRICS & GYNECOLOGY

## 2022-02-27 PROCEDURE — 59200 INSERT CERVICAL DILATOR: CPT | Performed by: OBSTETRICS & GYNECOLOGY

## 2022-02-27 PROCEDURE — G0463 HOSPITAL OUTPT CLINIC VISIT: HCPCS

## 2022-02-27 PROCEDURE — 250N000009 HC RX 250: Performed by: OBSTETRICS & GYNECOLOGY

## 2022-02-27 PROCEDURE — U0005 INFEC AGEN DETEC AMPLI PROBE: HCPCS | Performed by: OBSTETRICS & GYNECOLOGY

## 2022-02-27 PROCEDURE — 250N000011 HC RX IP 250 OP 636: Performed by: OBSTETRICS & GYNECOLOGY

## 2022-02-27 PROCEDURE — 120N000002 HC R&B MED SURG/OB UMMC

## 2022-02-27 PROCEDURE — 99213 OFFICE O/P EST LOW 20 MIN: CPT | Mod: 25 | Performed by: OBSTETRICS & GYNECOLOGY

## 2022-02-27 RX ORDER — KETOROLAC TROMETHAMINE 30 MG/ML
30 INJECTION, SOLUTION INTRAMUSCULAR; INTRAVENOUS
Status: DISCONTINUED | OUTPATIENT
Start: 2022-02-27 | End: 2022-03-02 | Stop reason: HOSPADM

## 2022-02-27 RX ORDER — ONDANSETRON 4 MG/1
4 TABLET, ORALLY DISINTEGRATING ORAL EVERY 6 HOURS PRN
Status: DISCONTINUED | OUTPATIENT
Start: 2022-02-27 | End: 2022-02-28 | Stop reason: HOSPADM

## 2022-02-27 RX ORDER — TRANEXAMIC ACID 10 MG/ML
1 INJECTION, SOLUTION INTRAVENOUS EVERY 30 MIN PRN
Status: DISCONTINUED | OUTPATIENT
Start: 2022-02-27 | End: 2022-02-28 | Stop reason: HOSPADM

## 2022-02-27 RX ORDER — NALOXONE HYDROCHLORIDE 0.4 MG/ML
0.2 INJECTION, SOLUTION INTRAMUSCULAR; INTRAVENOUS; SUBCUTANEOUS
Status: DISCONTINUED | OUTPATIENT
Start: 2022-02-27 | End: 2022-02-28 | Stop reason: HOSPADM

## 2022-02-27 RX ORDER — ACETAMINOPHEN 325 MG/1
650 TABLET ORAL EVERY 4 HOURS PRN
Status: DISCONTINUED | OUTPATIENT
Start: 2022-02-27 | End: 2022-02-28 | Stop reason: HOSPADM

## 2022-02-27 RX ORDER — PENICILLIN G POTASSIUM 5000000 [IU]/1
5 INJECTION, POWDER, FOR SOLUTION INTRAMUSCULAR; INTRAVENOUS ONCE
Status: COMPLETED | OUTPATIENT
Start: 2022-02-27 | End: 2022-02-27

## 2022-02-27 RX ORDER — PROCHLORPERAZINE MALEATE 10 MG
10 TABLET ORAL EVERY 6 HOURS PRN
Status: DISCONTINUED | OUTPATIENT
Start: 2022-02-27 | End: 2022-02-28 | Stop reason: HOSPADM

## 2022-02-27 RX ORDER — METHYLERGONOVINE MALEATE 0.2 MG/ML
200 INJECTION INTRAVENOUS
Status: DISCONTINUED | OUTPATIENT
Start: 2022-02-27 | End: 2022-02-28 | Stop reason: HOSPADM

## 2022-02-27 RX ORDER — PENICILLIN G 3000000 [IU]/50ML
3 INJECTION, SOLUTION INTRAVENOUS EVERY 4 HOURS
Status: DISCONTINUED | OUTPATIENT
Start: 2022-02-27 | End: 2022-02-28 | Stop reason: HOSPADM

## 2022-02-27 RX ORDER — NALOXONE HYDROCHLORIDE 0.4 MG/ML
0.4 INJECTION, SOLUTION INTRAMUSCULAR; INTRAVENOUS; SUBCUTANEOUS
Status: DISCONTINUED | OUTPATIENT
Start: 2022-02-27 | End: 2022-02-28 | Stop reason: HOSPADM

## 2022-02-27 RX ORDER — BUPROPION HYDROCHLORIDE 100 MG/1
100 TABLET, EXTENDED RELEASE ORAL DAILY
Status: DISCONTINUED | OUTPATIENT
Start: 2022-02-28 | End: 2022-03-02 | Stop reason: HOSPADM

## 2022-02-27 RX ORDER — MISOPROSTOL 200 UG/1
400 TABLET ORAL
Status: DISCONTINUED | OUTPATIENT
Start: 2022-02-27 | End: 2022-02-28 | Stop reason: HOSPADM

## 2022-02-27 RX ORDER — OXYTOCIN 10 [USP'U]/ML
10 INJECTION, SOLUTION INTRAMUSCULAR; INTRAVENOUS
Status: DISCONTINUED | OUTPATIENT
Start: 2022-02-27 | End: 2022-02-28 | Stop reason: HOSPADM

## 2022-02-27 RX ORDER — FENTANYL CITRATE 50 UG/ML
50-100 INJECTION, SOLUTION INTRAMUSCULAR; INTRAVENOUS
Status: DISCONTINUED | OUTPATIENT
Start: 2022-02-27 | End: 2022-02-28 | Stop reason: HOSPADM

## 2022-02-27 RX ORDER — LEVOTHYROXINE SODIUM 25 UG/1
50 TABLET ORAL DAILY
Status: DISCONTINUED | OUTPATIENT
Start: 2022-02-28 | End: 2022-03-02 | Stop reason: HOSPADM

## 2022-02-27 RX ORDER — MISOPROSTOL 200 UG/1
800 TABLET ORAL
Status: DISCONTINUED | OUTPATIENT
Start: 2022-02-27 | End: 2022-02-28 | Stop reason: HOSPADM

## 2022-02-27 RX ORDER — LIDOCAINE 40 MG/G
CREAM TOPICAL
Status: DISCONTINUED | OUTPATIENT
Start: 2022-02-27 | End: 2022-02-27 | Stop reason: HOSPADM

## 2022-02-27 RX ORDER — SODIUM CHLORIDE, SODIUM LACTATE, POTASSIUM CHLORIDE, CALCIUM CHLORIDE 600; 310; 30; 20 MG/100ML; MG/100ML; MG/100ML; MG/100ML
INJECTION, SOLUTION INTRAVENOUS CONTINUOUS PRN
Status: DISCONTINUED | OUTPATIENT
Start: 2022-02-27 | End: 2022-02-28 | Stop reason: HOSPADM

## 2022-02-27 RX ORDER — OXYTOCIN 10 [USP'U]/ML
10 INJECTION, SOLUTION INTRAMUSCULAR; INTRAVENOUS
Status: DISCONTINUED | OUTPATIENT
Start: 2022-02-27 | End: 2022-03-02 | Stop reason: HOSPADM

## 2022-02-27 RX ORDER — LIDOCAINE 40 MG/G
CREAM TOPICAL
Status: DISCONTINUED | OUTPATIENT
Start: 2022-02-27 | End: 2022-02-28 | Stop reason: HOSPADM

## 2022-02-27 RX ORDER — OXYTOCIN/0.9 % SODIUM CHLORIDE 30/500 ML
1-24 PLASTIC BAG, INJECTION (ML) INTRAVENOUS CONTINUOUS
Status: DISCONTINUED | OUTPATIENT
Start: 2022-02-27 | End: 2022-02-28 | Stop reason: HOSPADM

## 2022-02-27 RX ORDER — IBUPROFEN 600 MG/1
600 TABLET, FILM COATED ORAL
Status: DISCONTINUED | OUTPATIENT
Start: 2022-02-27 | End: 2022-03-02 | Stop reason: HOSPADM

## 2022-02-27 RX ORDER — METOCLOPRAMIDE HYDROCHLORIDE 5 MG/ML
10 INJECTION INTRAMUSCULAR; INTRAVENOUS EVERY 6 HOURS PRN
Status: DISCONTINUED | OUTPATIENT
Start: 2022-02-27 | End: 2022-02-28 | Stop reason: HOSPADM

## 2022-02-27 RX ORDER — CARBOPROST TROMETHAMINE 250 UG/ML
250 INJECTION, SOLUTION INTRAMUSCULAR
Status: DISCONTINUED | OUTPATIENT
Start: 2022-02-27 | End: 2022-02-28 | Stop reason: HOSPADM

## 2022-02-27 RX ORDER — PROCHLORPERAZINE 25 MG
25 SUPPOSITORY, RECTAL RECTAL EVERY 12 HOURS PRN
Status: DISCONTINUED | OUTPATIENT
Start: 2022-02-27 | End: 2022-02-28 | Stop reason: HOSPADM

## 2022-02-27 RX ORDER — ONDANSETRON 2 MG/ML
4 INJECTION INTRAMUSCULAR; INTRAVENOUS EVERY 6 HOURS PRN
Status: DISCONTINUED | OUTPATIENT
Start: 2022-02-27 | End: 2022-02-28 | Stop reason: HOSPADM

## 2022-02-27 RX ORDER — METOCLOPRAMIDE 10 MG/1
10 TABLET ORAL EVERY 6 HOURS PRN
Status: DISCONTINUED | OUTPATIENT
Start: 2022-02-27 | End: 2022-02-28 | Stop reason: HOSPADM

## 2022-02-27 RX ORDER — TERBUTALINE SULFATE 1 MG/ML
0.25 INJECTION, SOLUTION SUBCUTANEOUS
Status: DISCONTINUED | OUTPATIENT
Start: 2022-02-27 | End: 2022-02-28 | Stop reason: HOSPADM

## 2022-02-27 RX ORDER — OXYTOCIN/0.9 % SODIUM CHLORIDE 30/500 ML
100-340 PLASTIC BAG, INJECTION (ML) INTRAVENOUS CONTINUOUS PRN
Status: DISCONTINUED | OUTPATIENT
Start: 2022-02-27 | End: 2022-03-02 | Stop reason: HOSPADM

## 2022-02-27 RX ORDER — OXYTOCIN/0.9 % SODIUM CHLORIDE 30/500 ML
340 PLASTIC BAG, INJECTION (ML) INTRAVENOUS CONTINUOUS PRN
Status: DISCONTINUED | OUTPATIENT
Start: 2022-02-27 | End: 2022-02-28 | Stop reason: HOSPADM

## 2022-02-27 RX ADMIN — METOCLOPRAMIDE HYDROCHLORIDE 10 MG: 5 INJECTION INTRAMUSCULAR; INTRAVENOUS at 23:43

## 2022-02-27 RX ADMIN — ONDANSETRON 4 MG: 2 INJECTION INTRAMUSCULAR; INTRAVENOUS at 18:56

## 2022-02-27 RX ADMIN — Medication 3 MILLION UNITS: at 22:04

## 2022-02-27 RX ADMIN — SODIUM CHLORIDE, POTASSIUM CHLORIDE, SODIUM LACTATE AND CALCIUM CHLORIDE: 600; 310; 30; 20 INJECTION, SOLUTION INTRAVENOUS at 18:10

## 2022-02-27 RX ADMIN — PENICILLIN G POTASSIUM 5 MILLION UNITS: 5000000 POWDER, FOR SOLUTION INTRAMUSCULAR; INTRAPLEURAL; INTRATHECAL; INTRAVENOUS at 18:11

## 2022-02-27 RX ADMIN — Medication 2 MILLI-UNITS/MIN: at 18:13

## 2022-02-27 ASSESSMENT — ACTIVITIES OF DAILY LIVING (ADL)
WALKING_OR_CLIMBING_STAIRS_DIFFICULTY: NO
DIFFICULTY_EATING/SWALLOWING: NO
DRESSING/BATHING_DIFFICULTY: NO
HEARING_DIFFICULTY_OR_DEAF: NO
DOING_ERRANDS_INDEPENDENTLY_DIFFICULTY: NO
WEAR_GLASSES_OR_BLIND: NO
DIFFICULTY_COMMUNICATING: NO
CHANGE_IN_FUNCTIONAL_STATUS_SINCE_ONSET_OF_CURRENT_ILLNESS/INJURY: NO
CONCENTRATING,_REMEMBERING_OR_MAKING_DECISIONS_DIFFICULTY: NO
TOILETING_ISSUES: NO
FALL_HISTORY_WITHIN_LAST_SIX_MONTHS: NO

## 2022-02-27 NOTE — TELEPHONE ENCOUNTER
OB Triage Call      Is patient's OB/Midwife with the [facility] or [facility]? [facility]- Proceed with triage     Reason for call: labor    Assessment: contractions coming every 5 minutes for the past hour lasting for a minute. Patient directed to come into L and D by ObGyn when it got to this point    Plan: protocol recommends L and D now    Patient plans to deliver at [facility]    Patient's primary OB Provider is Dr. Alexander.      Per protocol recommendations Patient to be evaluated in L&D. Patient's primary OB is [facility].  Labor and delivery at [facility] ([phone]) notified of patient's pending arrival.  Report given to Mechelle charge RN.    Is patient's delivering hospital on divert? No      39w0d    Estimated Date of Delivery: Mar 6, 2022        OB History    Para Term  AB Living   2 1 1 0 0 1   SAB IAB Ectopic Multiple Live Births   0 0 0 0 1      # Outcome Date GA Lbr [place]/2nd Weight Sex Delivery Anes PTL Lv   2 Current            1 Term 19 37w3d  2.722 kg (6 lb) F CS-LTranv  N LUKE      Complications: Fetal Intolerance, GBS      Name: KANDICE MERAZ-MECHELLE      Apgar1: 7  Apgar5: 9       Lab Results   Component Value Date    GBS Positive (A) 2019          Caitlyn Rosenthal, RN 22 2:10 PM  [facility] Nurse Advisor    Reason for Disposition    [1] History of prior delivery (multipara) AND [2] contractions < 10 minutes apart AND [3] present 1 hour    Additional Information    Negative: Passed out (i.e., lost consciousness, collapsed and was not responding)    Negative: Shock suspected (e.g., cold/pale/clammy skin, too weak to stand, low BP, rapid pulse)    Negative: Difficult to awaken or acting confused (e.g., disoriented, slurred speech)    Negative: [1] SEVERE abdominal pain (e.g., excruciating) AND [2] constant AND [3] present > 1 hour    Negative: Severe bleeding (e.g., continuous red blood from vagina, or large blood clots)    Negative: Umbilical cord hanging  "out of the vagina (shiny, white, curled appearance, \"like telephone cord\")    Negative: Uncontrollable urge to push (i.e., feels like baby is coming out now)    Negative: Can see baby    Negative: Sounds like a life-threatening emergency to the triager    Negative: Pregnant < 37 weeks (i.e., )    Negative: [1] Uncertain delivery date AND [2] possibly pregnant < 37 weeks (i.e., )    Negative: [1] First baby (primipara) AND [2] contractions < 6 minutes apart  AND [3] present 2 hours    Protocols used: PREGNANCY - LABOR-A-AH      "

## 2022-02-27 NOTE — H&P
[facility]    History and Physical  Obstetrics and Gynecology     Date of Admission:  2022    Assessment & Plan   Mechelle Mata is a 31 year old female who presents with contractions  ASSESSMENT:   IUP @ 39w0d in very early  NST reactive.  Category  I    PLAN:   In very early labor with cervical change of closed to a tight 1cm in the last 2 days.  Gave the option of discharge, admit for augmentation, or recheck in 2 hours and decide at that time.  After discussing with her , Mechelle decided to stay for augmentation/admission.  Reviewed options of amezcua, pitocin, or both.  She asked which would be most effective and I recommended both pit and amezcua, to which she consented.  Desires delivery without epidural, but shares that she will see how things go.  CBC, T&S obtained on , so no need to repeat at this time.  COVID swab to be obtained today.  History of gHTN in previous pregnancy, but does not yet meet criteria this pregnancy.  HELLP labs and UPC WNL on .  Patient aware of need for continuous fetal monitoring during TOLAC.  TOLAC consent signed as outpatient.    Zuri Dumont       History of Present Illness   Mechelle Mata is a 31 year old female  38w5d  Estimated Date of Delivery: 3/6/22 is calculated from Patient's last menstrual period was 2021. [facility].    Has been having contractions off and on for multiple days.  Was seen in triage 2 days ago and was noted to be closed.  Reports she had a period of more painful contractions last night that resolved and she was able to get some rest. Contractions returned more uncomfortably at about 1000 today and have continued.  No leaking or bleeding.  Normal fetal movement.    PRENATAL COURSE  Prenatal course was complicated by:  -History of PLTCS for cat 2 RFD  -History of gestational hypertension  -GBS pos      Recent Labs   Lab Test 22  0006  21  1335 19  1117   ABO  --   --  A   RH  --   --  Pos   AS Negative   < > Neg    < > = values in this interval not displayed.     Rhogam not indicated   Recent Labs   Lab Test 21  1335 19  1637   HEPBANG Nonreactive  --    HIAGAB Nonreactive  --    GBS  --  Positive*   RUQIGG Positive*  --        Past Medical History    I have reviewed this patient's medical history and updated it with pertinent information if needed.   Past Medical History:   Diagnosis Date     Depressive disorder      Thyroid disease      Uncomplicated asthma        Past Surgical History   I have reviewed this patient's surgical history and updated it with pertinent information if needed.  Past Surgical History:   Procedure Laterality Date      SECTION N/A 2019    Procedure:  SECTION;  Surgeon: Zuri Cisneros MD;  [facility]: [facility]     wisdom teeth       ZZC EXCISION OF GANGLION CYST FOREARM Left 2006    wrist       Prior to Admission Medications   Prior to Admission Medications   Prescriptions Last Dose Informant Patient Reported? Taking?   Prenatal Vit-Fe Fumarate-FA (PRENATAL PO) 2022 at Unknown time  Yes Yes   WELLBUTRIN  MG 12 hr tablet 2022 at Unknown time  No Yes   Sig: Take 1 tablet (100 mg) by mouth 2 times daily   albuterol (PROVENTIL HFA) 108 (90 Base) MCG/ACT inhaler Unknown at Unknown time  No Yes   Sig: Inhale 2 puffs into the lungs every 6 hours as needed for shortness of breath / dyspnea or wheezing   amoxicillin (AMOXIL) 250 MG capsule More than a month at Unknown time  No Yes   Sig: Take 1 capsule (250 mg) by mouth once as needed (after coitus)   clindamycin (CLINDAMAX) 1 % external gel 2022 at Unknown time  No Yes   Sig: Apply topically 2 times daily   docusate sodium (COLACE) 50 MG capsule Unknown at Unknown time  Yes Yes   Sig: Take 50 mg by mouth 2 times daily   doxylamine (UNISOM) 25 MG TABS tablet Past Week at Unknown time  Yes Yes   Sig: Take 25  "mg by mouth At Bedtime   escitalopram (LEXAPRO) 10 MG tablet   No No   Sig: Take 0.5 tablets (5 mg) by mouth daily for 14 days, THEN 1 tablet (10 mg) daily.   levothyroxine (SYNTHROID/LEVOTHROID) 50 MCG tablet 2/27/2022 at Unknown time  No Yes   Sig: Take 1 tablet (50 mcg) by mouth daily   metoclopramide (REGLAN) 5 MG tablet   No No   Sig: Take 1 tablet (5 mg) by mouth 4 times daily (before meals and nightly)   pyridOXINE (VITAMIN  B-6) 25 MG tablet   Yes No   Sig: Take 25 mg by mouth daily       Facility-Administered Medications: None     Allergies   No Known Allergies    Social History   I have reviewed this patient's social history and updated it with pertinent information if needed. Mechelle Mata  reports that she has never smoked. She has never used smokeless tobacco. She reports previous alcohol use. She reports that she does not use drugs.    Family History   I have reviewed this patient's family history and updated it with pertinent information if needed.   Family History   Problem Relation Age of Onset     Mental Illness Mother      Depression Mother      Heart Defect Mother         \"mini-stroke\" 2/2 hole in her heart     Mental Illness Father      Depression Father      Heart Failure Paternal Grandfather        Immunization History   Immunization History   Administered Date(s) Administered     COVID-19,PF,Moderna 01/08/2021, 02/05/2021, 11/05/2021     DT (PEDS <7y) 01/01/2001     DTAP (<7y) 1989, 1989, 1989, 04/18/1991, 06/27/1994     DTaP, Unspecified 04/18/1991, 08/30/2011     Flu, Unspecified 12/03/2018     HPV Quadrivalent 08/10/2007, 07/01/2010, 08/30/2011     Hep B, Peds or Adolescent 03/21/2001, 05/02/2001, 11/13/2011     HepA-ped 2 Dose 05/11/2007, 08/30/2011     HepB 11/13/2001     Influenza Vaccine IM > 6 months Valent IIV4 (Alfuria,Fluzone) 10/01/2021     Influenza Vaccine, 6+MO IM (QUADRIVALENT W/PRESERVATIVES) 10/02/2019, 11/01/2020     MMR 11/30/1990, 03/21/2001, 11/22/2019 "     Meningococcal (Menveo ) 08/10/2007     OPV, unspecified 04/18/1991     Poliovirus, inactivated (IPV) 1989, 1989, 04/18/1991, 06/27/1994     TDAP Vaccine (Adacel) 09/23/2019     Tdap (Adacel,Boostrix) 12/17/2021     Typhoid IM 05/11/2007       Physical Exam   Temp: 98.2  F (36.8  C) Temp src: Oral BP: 133/84 Pulse: 89   Resp: 16        Vital Signs with Ranges  Temp:  [98.2  F (36.8  C)] 98.2  F (36.8  C)  Pulse:  [89] 89  Resp:  [16] 16  BP: (133)/(84) 133/84    Abdomen: gravid, single vertex fetus, non-tender.  EFW 7lb.  Cephalic by BSUS  Cervical Exam: tight 1/-2, medium and posterior     Fetal Heart Tones: 125 baseline, moderate variablility, + accels, no decels and Category I  TOCO:   frequency q 3-5 minutes    Constitutional: healthy, alert, active and no distress   Respiratory: no increased work of breathing  Cardiovascular: normal apical pulses   Skin/Extremites: no bruising or bleeding, normal skin color, texture, turgor and no redness, warmth, or swelling  Neurologic: Awake, alert, oriented   Neuropsychiatric: appropriate mood and affect      ----------------------  Addendum:  Personally place amezcua for cervical ripening at ~ 1735 and patient tolerated well.  75cc of fluid in balloon.    Zuri Dumont MD

## 2022-02-28 ENCOUNTER — ANESTHESIA (OUTPATIENT)
Dept: OBGYN | Facility: CLINIC | Age: 32
End: 2022-02-28
Payer: COMMERCIAL

## 2022-02-28 ENCOUNTER — ANESTHESIA EVENT (OUTPATIENT)
Dept: OBGYN | Facility: CLINIC | Age: 32
End: 2022-02-28
Payer: COMMERCIAL

## 2022-02-28 PROCEDURE — 250N000013 HC RX MED GY IP 250 OP 250 PS 637: Performed by: OBSTETRICS & GYNECOLOGY

## 2022-02-28 PROCEDURE — 00HU33Z INSERTION OF INFUSION DEVICE INTO SPINAL CANAL, PERCUTANEOUS APPROACH: ICD-10-PCS | Performed by: ANESTHESIOLOGY

## 2022-02-28 PROCEDURE — 258N000003 HC RX IP 258 OP 636: Performed by: OBSTETRICS & GYNECOLOGY

## 2022-02-28 PROCEDURE — 370N000003 HC ANESTHESIA WARD SERVICE

## 2022-02-28 PROCEDURE — 0KQM0ZZ REPAIR PERINEUM MUSCLE, OPEN APPROACH: ICD-10-PCS | Performed by: OBSTETRICS & GYNECOLOGY

## 2022-02-28 PROCEDURE — 250N000013 HC RX MED GY IP 250 OP 250 PS 637

## 2022-02-28 PROCEDURE — 250N000009 HC RX 250: Performed by: OBSTETRICS & GYNECOLOGY

## 2022-02-28 PROCEDURE — 250N000011 HC RX IP 250 OP 636: Performed by: STUDENT IN AN ORGANIZED HEALTH CARE EDUCATION/TRAINING PROGRAM

## 2022-02-28 PROCEDURE — 250N000011 HC RX IP 250 OP 636: Performed by: OBSTETRICS & GYNECOLOGY

## 2022-02-28 PROCEDURE — 10907ZC DRAINAGE OF AMNIOTIC FLUID, THERAPEUTIC FROM PRODUCTS OF CONCEPTION, VIA NATURAL OR ARTIFICIAL OPENING: ICD-10-PCS | Performed by: OBSTETRICS & GYNECOLOGY

## 2022-02-28 PROCEDURE — 0UQGXZZ REPAIR VAGINA, EXTERNAL APPROACH: ICD-10-PCS | Performed by: OBSTETRICS & GYNECOLOGY

## 2022-02-28 PROCEDURE — 3E0R3BZ INTRODUCTION OF ANESTHETIC AGENT INTO SPINAL CANAL, PERCUTANEOUS APPROACH: ICD-10-PCS | Performed by: ANESTHESIOLOGY

## 2022-02-28 PROCEDURE — 120N000002 HC R&B MED SURG/OB UMMC

## 2022-02-28 PROCEDURE — 722N000001 HC LABOR CARE VAGINAL DELIVERY SINGLE

## 2022-02-28 RX ORDER — LIDOCAINE HYDROCHLORIDE 10 MG/ML
INJECTION, SOLUTION EPIDURAL; INFILTRATION; INTRACAUDAL; PERINEURAL
Status: DISCONTINUED
Start: 2022-02-28 | End: 2022-02-28 | Stop reason: HOSPADM

## 2022-02-28 RX ORDER — MISOPROSTOL 200 UG/1
800 TABLET ORAL
Status: DISCONTINUED | OUTPATIENT
Start: 2022-02-28 | End: 2022-03-02 | Stop reason: HOSPADM

## 2022-02-28 RX ORDER — MISOPROSTOL 200 UG/1
TABLET ORAL
Status: DISCONTINUED
Start: 2022-02-28 | End: 2022-02-28 | Stop reason: HOSPADM

## 2022-02-28 RX ORDER — DOCUSATE SODIUM 100 MG/1
100 CAPSULE, LIQUID FILLED ORAL DAILY
Status: DISCONTINUED | OUTPATIENT
Start: 2022-02-28 | End: 2022-03-02 | Stop reason: HOSPADM

## 2022-02-28 RX ORDER — HYDROCORTISONE 2.5 %
CREAM (GRAM) TOPICAL 3 TIMES DAILY PRN
Status: DISCONTINUED | OUTPATIENT
Start: 2022-02-28 | End: 2022-03-02 | Stop reason: HOSPADM

## 2022-02-28 RX ORDER — OXYTOCIN 10 [USP'U]/ML
10 INJECTION, SOLUTION INTRAMUSCULAR; INTRAVENOUS
Status: DISCONTINUED | OUTPATIENT
Start: 2022-02-28 | End: 2022-03-02 | Stop reason: HOSPADM

## 2022-02-28 RX ORDER — OXYTOCIN/0.9 % SODIUM CHLORIDE 30/500 ML
PLASTIC BAG, INJECTION (ML) INTRAVENOUS
Status: DISCONTINUED
Start: 2022-02-28 | End: 2022-02-28 | Stop reason: HOSPADM

## 2022-02-28 RX ORDER — IBUPROFEN 800 MG/1
800 TABLET, FILM COATED ORAL EVERY 6 HOURS PRN
Status: DISCONTINUED | OUTPATIENT
Start: 2022-02-28 | End: 2022-03-02 | Stop reason: HOSPADM

## 2022-02-28 RX ORDER — OXYTOCIN 10 [USP'U]/ML
INJECTION, SOLUTION INTRAMUSCULAR; INTRAVENOUS
Status: DISCONTINUED
Start: 2022-02-28 | End: 2022-02-28 | Stop reason: HOSPADM

## 2022-02-28 RX ORDER — ACETAMINOPHEN 325 MG/1
650 TABLET ORAL EVERY 4 HOURS PRN
Status: DISCONTINUED | OUTPATIENT
Start: 2022-02-28 | End: 2022-03-02 | Stop reason: HOSPADM

## 2022-02-28 RX ORDER — METHYLERGONOVINE MALEATE 0.2 MG/ML
200 INJECTION INTRAVENOUS
Status: DISCONTINUED | OUTPATIENT
Start: 2022-02-28 | End: 2022-03-02 | Stop reason: HOSPADM

## 2022-02-28 RX ORDER — BISACODYL 10 MG
10 SUPPOSITORY, RECTAL RECTAL DAILY PRN
Status: DISCONTINUED | OUTPATIENT
Start: 2022-02-28 | End: 2022-03-02 | Stop reason: HOSPADM

## 2022-02-28 RX ORDER — MISOPROSTOL 200 UG/1
400 TABLET ORAL
Status: DISCONTINUED | OUTPATIENT
Start: 2022-02-28 | End: 2022-03-02 | Stop reason: HOSPADM

## 2022-02-28 RX ORDER — FENTANYL/ROPIVACAINE/NS/PF 2MCG/ML-.1
PLASTIC BAG, INJECTION (ML) EPIDURAL
Status: DISCONTINUED | OUTPATIENT
Start: 2022-02-28 | End: 2022-02-28

## 2022-02-28 RX ORDER — OXYTOCIN/0.9 % SODIUM CHLORIDE 30/500 ML
340 PLASTIC BAG, INJECTION (ML) INTRAVENOUS CONTINUOUS PRN
Status: DISCONTINUED | OUTPATIENT
Start: 2022-02-28 | End: 2022-03-02 | Stop reason: HOSPADM

## 2022-02-28 RX ORDER — NALBUPHINE HYDROCHLORIDE 10 MG/ML
2.5-5 INJECTION, SOLUTION INTRAMUSCULAR; INTRAVENOUS; SUBCUTANEOUS EVERY 6 HOURS PRN
Status: DISCONTINUED | OUTPATIENT
Start: 2022-02-28 | End: 2022-03-02 | Stop reason: HOSPADM

## 2022-02-28 RX ORDER — CARBOPROST TROMETHAMINE 250 UG/ML
250 INJECTION, SOLUTION INTRAMUSCULAR
Status: DISCONTINUED | OUTPATIENT
Start: 2022-02-28 | End: 2022-03-02 | Stop reason: HOSPADM

## 2022-02-28 RX ORDER — FENTANYL/ROPIVACAINE/NS/PF 2MCG/ML-.1
PLASTIC BAG, INJECTION (ML) EPIDURAL
Status: DISCONTINUED | OUTPATIENT
Start: 2022-02-28 | End: 2022-03-02 | Stop reason: HOSPADM

## 2022-02-28 RX ORDER — MODIFIED LANOLIN
OINTMENT (GRAM) TOPICAL
Status: DISCONTINUED | OUTPATIENT
Start: 2022-02-28 | End: 2022-03-02 | Stop reason: HOSPADM

## 2022-02-28 RX ORDER — FENTANYL CITRATE-0.9 % NACL/PF 10 MCG/ML
100 PLASTIC BAG, INJECTION (ML) INTRAVENOUS EVERY 5 MIN PRN
Status: DISCONTINUED | OUTPATIENT
Start: 2022-02-28 | End: 2022-03-02 | Stop reason: HOSPADM

## 2022-02-28 RX ORDER — TRANEXAMIC ACID 10 MG/ML
1 INJECTION, SOLUTION INTRAVENOUS EVERY 30 MIN PRN
Status: DISCONTINUED | OUTPATIENT
Start: 2022-02-28 | End: 2022-03-02 | Stop reason: HOSPADM

## 2022-02-28 RX ADMIN — Medication: at 08:28

## 2022-02-28 RX ADMIN — KETOROLAC TROMETHAMINE 30 MG: 30 INJECTION, SOLUTION INTRAMUSCULAR at 18:45

## 2022-02-28 RX ADMIN — Medication 3 MILLION UNITS: at 13:50

## 2022-02-28 RX ADMIN — SODIUM CHLORIDE, POTASSIUM CHLORIDE, SODIUM LACTATE AND CALCIUM CHLORIDE 1000 ML: 600; 310; 30; 20 INJECTION, SOLUTION INTRAVENOUS at 07:47

## 2022-02-28 RX ADMIN — DOCUSATE SODIUM 100 MG: 100 CAPSULE, LIQUID FILLED ORAL at 21:39

## 2022-02-28 RX ADMIN — Medication: at 10:55

## 2022-02-28 RX ADMIN — Medication 3 MILLION UNITS: at 17:44

## 2022-02-28 RX ADMIN — Medication 3 MILLION UNITS: at 02:03

## 2022-02-28 RX ADMIN — LIDOCAINE HYDROCHLORIDE 15 ML: 10 INJECTION, SOLUTION EPIDURAL; INFILTRATION; INTRACAUDAL; PERINEURAL at 18:04

## 2022-02-28 RX ADMIN — LEVOTHYROXINE SODIUM 50 MCG: 25 TABLET ORAL at 10:37

## 2022-02-28 RX ADMIN — BUPROPION HYDROCHLORIDE 100 MG: 100 TABLET, EXTENDED RELEASE ORAL at 10:35

## 2022-02-28 RX ADMIN — ONDANSETRON 4 MG: 2 INJECTION INTRAMUSCULAR; INTRAVENOUS at 04:42

## 2022-02-28 RX ADMIN — Medication 3 MILLION UNITS: at 10:10

## 2022-02-28 RX ADMIN — Medication 3 MILLION UNITS: at 06:00

## 2022-02-28 RX ADMIN — SODIUM CHLORIDE, POTASSIUM CHLORIDE, SODIUM LACTATE AND CALCIUM CHLORIDE: 600; 310; 30; 20 INJECTION, SOLUTION INTRAVENOUS at 12:44

## 2022-02-28 RX ADMIN — PROCHLORPERAZINE EDISYLATE 10 MG: 5 INJECTION INTRAMUSCULAR; INTRAVENOUS at 06:33

## 2022-02-28 NOTE — PLAN OF CARE
Data: Patient presented to [facility] at 1434.   Reason for maternal/fetal assessment per patient is Rule Out Labor  . Complaining of contractions, breathing through them.   Patient is a . Prenatal record reviewed.      OB History    Para Term  AB Living   2 1 1 0 0 1   SAB IAB Ectopic Multiple Live Births   0 0 0 0 1      # Outcome Date GA Lbr [place]/2nd Weight Sex Delivery Anes PTL Lv   2 Current            1 Term 19 37w3d  2.722 kg (6 lb) F CS-LTranv  N LUKE      Complications: Fetal Intolerance, GBS      Name: KT,FEMALE-JAYDEN      Apgar1: 7  Apgar5: 9   . Medical history:   Past Medical History:   Diagnosis Date     Depressive disorder      Thyroid disease      Uncomplicated asthma    . Gestational Age 39w0d. VSS. Fetal movement present. Patient denies vaginal discharge, pelvic pressure, UTI symptoms, GI problems, bloody show, vaginal bleeding, edema, headache, visual disturbances, epigastric or URQ pain, rupture of membranes. Support person, Brett, present.  Action: Verbal consent for EFM. Triage assessment completed. EFM applied upon arrival. Uterine assessment q. Fetal assessment: Presumed adequate fetal oxygenation documented (see flow record).   Response: Dr. Dumont informed of patients arrival for rule out labor. Plan per provider is to admit patient for induction of labor. Patient verbalized agreement with plan. Patient transferred to room 442 ambulatory, oriented to room and call light.

## 2022-02-28 NOTE — PROGRESS NOTES
Pt laboring with spouse at the bedside. VSS. Afebrile. Pt denies HA, blurry vision and RUQ pain.  Assisted with frequent position changes for labor progression. Pt  becoming increasingly uncomfortable. Pt denies epidural at this time, but is aware it is available. Currently managing labor pain with NO2, massage, heat, guided meditation, and hydrotherapy. Medications provided for nausea with some relief. EFM shows category I tracing. Continue plan of care.  SBAR to Carolina lacy.

## 2022-02-28 NOTE — PROGRESS NOTES
[facility] DELIV[facility] PROGRESS NOTE:   2022 7:04 AM          SUBJECTIVE:   Getting increasingly uncomfortable, but continues to cope quite well.         OBJECTIVE:     Vitals:    22 2205 22 0015 22 0207 22 0450   BP: 110/58 103/52 132/74 118/58   BP Location: Left arm      Patient Position: Right side      Cuff Size: Adult Regular      Pulse:  69 83 76   Resp: 16 16 18 18   Temp: 99.9  F (37.7  C)  97.8  F (36.6  C) 97.7  F (36.5  C)   TempSrc: Oral  Oral Oral         NST:  reactive  FHT:  /mod/+ accels, no Decels.   Cat:   1  [place]:  q2-4min  SVE:  6.5/70/-2 very soft, still posterior.  AROM performed with patient verbal consent, clear fluid             ASSESSMENT / PLAN:   31 year old  at 39w1d admitted for induction/augmentation of early labor.    Complications:  Hx of LTCS, desires TOLAC  Hx gHTN in previous pregnancy  GBS pos    Labor: s/p amezcua and now AROM.  Cont pitocin.    Fetal well being: Category 1 tracing.  GBS: pos, pcn ordered  Pain: hypobirthing at this time, but requesting NO after we reviewed all the options.    Patient signed out to carmen [facility], Dr. Ortiz.    Zuri Dumont MD

## 2022-02-28 NOTE — ANESTHESIA PROCEDURE NOTES
Epidural catheter Procedure Note    Pre-Procedure   Staff -        Anesthesiologist:  Nato Cabrera MD       Resident/Fellow: Dilshad Tripp MD       Performed By: resident       Location: OB       Procedure Start/Stop Times: 2/28/2022 8:10 AM and 2/28/2022 8:24 AM       Pre-Anesthestic Checklist: patient identified, IV checked, risks and benefits discussed, informed consent, monitors and equipment checked, pre-op evaluation, at physician/surgeon's request and post-op pain management  Timeout:       Correct Patient: Yes        Correct Procedure: Yes        Correct Site: Yes        Correct Position: Yes   Procedure Documentation  Procedure: epidural catheter       Patient Position: sitting       Skin prep: DuraPrep and Chloraprep      Local skin infiltrated with mL of 2% lidocaine.        Insertion Site: T7-8.       Technique: LORT saline        TODD at 6.5 cm.       Needle Type: Touhy needle       Needle Gauge: 17.        Needle Length (Inches): 3.5        Catheter: 18 G.         Catheter threaded easily.         3.5 cm epidural space.         Threaded 10 cm at skin.         # of attempts: 2 and  # of redirects:  1    Assessment/Narrative         Paresthesias: Resolved.       Test dose of 3 mL lidocaine 1.5% w/ 1:200,000 epinephrine at 08:15 CST.         Test dose negative, 3 minutes after injection, for signs of intravascular, subdural, or intrathecal injection.       Insertion/Infusion Method: LORT saline       No aspiration negative for Heme or CSF via Epidural Catheter.       Sensory Level Left: L3.       Sensory Level Right: L3.

## 2022-02-28 NOTE — PROGRESS NOTES
Called to bedside for decel. Was down about 2 min and amezcua placed, pitocin turned off. Now 7/80/0 station     After turing to side now fetal tracing is 115-120 with small early variables noted. Discussed has made good progress since last check even though dilation is the same, more effaced and lower station.     Continue present management and monitor tracing.     Mariam Ortiz MD

## 2022-02-28 NOTE — PROGRESS NOTES
Reviewed pt progress with RN's at station. She is doing well but shaky.    Pitocin up 10 mU and now with 170 MVU.   FSE: 115 with moderate varibility, accels and some intermittent early and variable decels    Plan recheck cervix after 2 hours adequate labor. Continue TOLAC    Mariam Ortiz MD

## 2022-02-28 NOTE — PROVIDER NOTIFICATION
02/28/22 1307   Provider Notification   Provider Name/Title Dr. Ortiz   Method of Notification Electronic Page   Request Evaluate in Person   Notification Reason Decels   Dr. Ortiz paged to bedside for prolonged decel.     1308: Dr. Blue in department. At bedside until Dr. Ortiz able to be at bedside.

## 2022-02-28 NOTE — PROVIDER NOTIFICATION
02/28/22 1310   Provider Notification   Provider Name/Title Dr Ortiz   Prolonged decel-5 min yue of 60 after epidural placement. Repositioned patient, fluid bolis initiated, discontinued oxytocin, and provider notified. Rabago catheter placed. Following interventions FHR returned to baseline of 115, moderate variability present. Oxytocin restarted at 1320-10 mu/min per MD Ortiz.

## 2022-02-28 NOTE — PROGRESS NOTES
[facility] DEL[facility] PROGRESS NOTE:   2022 1:54 AM          SUBJECTIVE:   Doing well with position changes, birthing ball, etc.  Denies concerns or complaints.           OBJECTIVE:     Vitals:    22 1810 22 2205 22 0015   BP: 133/74 118/58 110/58 103/52   BP Location: Right arm Right arm Left arm    Patient Position: Sitting  Right side    Cuff Size: Adult Regular  Adult Regular    Pulse:    69   Resp: 16 16 16 16   Temp: 98  F (36.7  C)  99.9  F (37.7  C)    TempSrc: Oral  Oral          NST:  reactive  FHT:  120/mod/+ accels, no decels  Cat:   1  [place]:  q 2-3 min  SVE:  5/60/-2, very soft, still posterior.  Feels like laboring cervix.             ASSESSMENT / PLAN:   31 year old  at 39w1d admitted for induction/augmentation of early labor.    Complications:  Hx of LTCS, desires TOLAC  Hx gHTN in previous pregnancy  GBS pos    Labor: s/p amezcua.  Cont pitocin.  Will hold off on AROM at this time as she's making change and hoping to go without epidural.  Fetal well being: Category 1 tracing.  GBS: pos, pcn ordered  Pain: hypobirthing at this time.      Zuri Dumont MD

## 2022-02-28 NOTE — PROVIDER NOTIFICATION
02/28/22 0847   Provider Notification   Provider Name/Title Dr. Ortiz   Method of Notification At Bedside   Prolonged decel-7 min yue of 60 after epidural placement. Repositioned patient, fluid bolis initiated, discontinued oxytocin, and provider notified. SVE-7cm, IUPC inserted. FSE placed. Following interventions FHR returned to baseline of 120, moderate variability present. Oxytocin restarted at 0852 at 5 mu/min per MD Ortiz.

## 2022-02-28 NOTE — PROGRESS NOTES
Called to room with bradycardia after epidural. Pt comfortable with epidural    Internal monitors placed, pitocin stopped and IV fluid bolus. 7/80/-2  8 min total tyson, now 's with moderate variability and no further decels.     Restart pitocin at 5 mU and discussed need for contraction pattern.     Answered questions. Plan recheck cx after adequate contractions for at least 2 hours.  Mariam Ortiz MD

## 2022-02-28 NOTE — PLAN OF CARE
VSS. Rabago balloon placed at 1735 (75ml). Pitocin started and titrated per orders. PCN started at 1800. Rabago balloon still in place as of 2215. Pt breathing through ctx. Ambulating in room, using birthing ball, hypnobirthing, peanut ball, aromatherapy. FHR appropriate for gestational - see flowsheets for details.

## 2022-02-28 NOTE — PROVIDER NOTIFICATION
02/28/22 1305   Provider Notification   Provider Name/Title Dr. Ortiz   Method of Notification Electronic Page   Request Evaluate in Person   Notification Reason Decels   Cat 2 tracing with late and variable. Please come assess

## 2022-02-28 NOTE — PROGRESS NOTES
[facility] PROGRESS NOTE:   2022 8:43 PM         SUBJECTIVE:   Patient reports she's trying to rest.  Denies any needs at this time.           OBJECTIVE:     Vitals:    22 1452 22 1810 22   BP: 133/84 133/74 118/58   BP Location: Left arm Right arm Right arm   Patient Position: Sitting Sitting    Cuff Size:  Adult Regular    Pulse: 89     Resp: 16 16 16   Temp: 98.2  F (36.8  C) 98  F (36.7  C)    TempSrc: Oral Oral          NST:  reactive  FHT:  135/mod/+ accels, no decels  Cat:   1  Ione:  q 5-7 min  SVE:  deferred             ASSESSMENT / PLAN:   31 year old  at 39w0d admitted for induction/augmentation of early labor.    Complications:  Hx of LTCS, desires TOLAC  Hx gHTN in previous pregnancy  GBS pos    Labor: Rabago in place, titrate pitocin per protocol  Fetal well being: Category 1 tracing.  GBS: pos, pcn ordered  Pain: hypobirthing at this time.      Zuri Dumont MD

## 2022-02-28 NOTE — PROGRESS NOTES
Pt now complete and pushing on hands and knees. Fetal tracing cat 1    Anticipate     Mariam Ortiz MD

## 2022-02-28 NOTE — ANESTHESIA PREPROCEDURE EVALUATION
Anesthesia Pre-Procedure Evaluation    Patient: Mechelle Mata   MRN: [id number] : 1990        Procedure : * No procedures listed *          Past Medical History:   Diagnosis Date     Depressive disorder      Thyroid disease      Uncomplicated asthma       Past Surgical History:   Procedure Laterality Date      SECTION N/A 2019    Procedure:  SECTION;  Surgeon: Zuri Cisneros MD;  Location: [facility] L[facility]     wisdom teeth       ZZC EXCISION OF GANGLION CYST FOREARM Left 2006    wrist      No Known Allergies   Social History     Tobacco Use     Smoking status: Never Smoker     Smokeless tobacco: Never Used   Substance Use Topics     Alcohol use: Not Currently      Wt Readings from Last 1 Encounters:   22 88.9 kg (196 lb)        Anesthesia Evaluation   Pt has had prior anesthetic. Type: Regional.        ROS/MED HX  ENT/Pulmonary:     (+) asthma     Neurologic:  - neg neurologic ROS     Cardiovascular:     (+) hypertension-----    METS/Exercise Tolerance:     Hematologic:  - neg hematologic  ROS     Musculoskeletal:  - neg musculoskeletal ROS     GI/Hepatic:  - neg GI/hepatic ROS     Renal/Genitourinary:  - neg Renal ROS     Endo:     (+) thyroid problem,     Psychiatric/Substance Use:  - neg psychiatric ROS     Infectious Disease:  - neg infectious disease ROS     Malignancy:  - neg malignancy ROS     Other:  - neg other ROS          Physical Exam    Airway        Mallampati: II   TM distance: > 3 FB   Neck ROM: full   Mouth opening: > 3 cm    Respiratory Devices and Support         Dental  no notable dental history         Cardiovascular   cardiovascular exam normal          Pulmonary   pulmonary exam normal                OUTSIDE LABS:  CBC:   Lab Results   Component Value Date    WBC 10.0 2022    WBC 8.1 2021    HGB 13.5 2022    HGB 13.8 02/10/2022    HCT 40.0 2022    HCT 37.6 2021     2022     2021     BMP:   Lab Results    Component Value Date    CR 0.69 02/26/2022    CR 0.66 08/06/2021     COAGS: No results found for: PTT, INR, FIBR  POC:   Lab Results   Component Value Date    HCGS Positive (A) 04/25/2019     HEPATIC:   Lab Results   Component Value Date    ALT 17 02/26/2022    AST 21 02/26/2022     OTHER:   Lab Results   Component Value Date    TSH 2.64 02/10/2022    T4 0.92 01/15/2020       Anesthesia Plan    ASA Status:  3   NPO Status:  ELEVATED Aspiration Risk/Unknown    Anesthesia Type: Epidural.   Induction: N/a.   Maintenance: N/A.        Consents    Anesthesia Plan(s) and associated risks, benefits, and realistic alternatives discussed. Questions answered and patient/representative(s) expressed understanding.    - Discussed:     - Discussed with:  Patient      - Extended Intubation/Ventilatory Support Discussed: No.      - Patient is DNR/DNI Status: No         Postoperative Care    Pain management: Neuraxial analgesia.        Comments:                Dilshad Tripp MD

## 2022-03-01 LAB — HGB BLD-MCNC: 10.4 G/DL (ref 11.7–15.7)

## 2022-03-01 PROCEDURE — 250N000013 HC RX MED GY IP 250 OP 250 PS 637

## 2022-03-01 PROCEDURE — 250N000013 HC RX MED GY IP 250 OP 250 PS 637: Performed by: OBSTETRICS & GYNECOLOGY

## 2022-03-01 PROCEDURE — 36415 COLL VENOUS BLD VENIPUNCTURE: CPT | Performed by: OBSTETRICS & GYNECOLOGY

## 2022-03-01 PROCEDURE — 85018 HEMOGLOBIN: CPT | Performed by: OBSTETRICS & GYNECOLOGY

## 2022-03-01 PROCEDURE — 120N000002 HC R&B MED SURG/OB UMMC

## 2022-03-01 RX ORDER — ACETAMINOPHEN 325 MG/1
650 TABLET ORAL EVERY 6 HOURS PRN
COMMUNITY
Start: 2022-03-01 | End: 2022-03-31

## 2022-03-01 RX ORDER — IBUPROFEN 600 MG/1
600 TABLET, FILM COATED ORAL EVERY 6 HOURS PRN
COMMUNITY
Start: 2022-03-01 | End: 2022-03-31

## 2022-03-01 RX ORDER — BUPROPION HCL 100 MG
100 TABLET,SUSTAINED-RELEASE 12 HR ORAL 2 TIMES DAILY
Qty: 50 TABLET | Refills: 0 | Status: SHIPPED | OUTPATIENT
Start: 2022-03-01 | End: 2022-03-01

## 2022-03-01 RX ORDER — AMOXICILLIN 250 MG
1 CAPSULE ORAL DAILY
COMMUNITY
Start: 2022-03-01 | End: 2022-03-31

## 2022-03-01 RX ORDER — BUPROPION HYDROCHLORIDE 100 MG/1
100 TABLET, EXTENDED RELEASE ORAL DAILY
Qty: 50 TABLET | Refills: 0 | Status: SHIPPED | OUTPATIENT
Start: 2022-03-02 | End: 2022-05-02

## 2022-03-01 RX ORDER — BUPROPION HCL 100 MG
100 TABLET,SUSTAINED-RELEASE 12 HR ORAL 2 TIMES DAILY
Qty: 50 TABLET | Refills: 0 | Status: SHIPPED | OUTPATIENT
Start: 2022-03-01 | End: 2022-07-21

## 2022-03-01 RX ADMIN — ACETAMINOPHEN 650 MG: 325 TABLET, FILM COATED ORAL at 23:40

## 2022-03-01 RX ADMIN — IBUPROFEN 800 MG: 800 TABLET, FILM COATED ORAL at 23:40

## 2022-03-01 RX ADMIN — ACETAMINOPHEN 650 MG: 325 TABLET, FILM COATED ORAL at 14:10

## 2022-03-01 RX ADMIN — ACETAMINOPHEN 650 MG: 325 TABLET, FILM COATED ORAL at 09:17

## 2022-03-01 RX ADMIN — LEVOTHYROXINE SODIUM 50 MCG: 25 TABLET ORAL at 08:36

## 2022-03-01 RX ADMIN — BUPROPION HYDROCHLORIDE 100 MG: 100 TABLET, EXTENDED RELEASE ORAL at 09:14

## 2022-03-01 RX ADMIN — DOCUSATE SODIUM 100 MG: 100 CAPSULE, LIQUID FILLED ORAL at 08:36

## 2022-03-01 RX ADMIN — IBUPROFEN 800 MG: 800 TABLET, FILM COATED ORAL at 16:34

## 2022-03-01 RX ADMIN — ACETAMINOPHEN 650 MG: 325 TABLET, FILM COATED ORAL at 19:33

## 2022-03-01 RX ADMIN — IBUPROFEN 800 MG: 800 TABLET, FILM COATED ORAL at 01:19

## 2022-03-01 RX ADMIN — IBUPROFEN 800 MG: 800 TABLET, FILM COATED ORAL at 10:33

## 2022-03-01 RX ADMIN — ACETAMINOPHEN 650 MG: 325 TABLET, FILM COATED ORAL at 05:20

## 2022-03-01 NOTE — PROGRESS NOTES
[facility]    Post-Partum Progress Note    Assessment & Plan   Assessment:  Post-partum day #1  Vaginal delivery after     Doing well.  Normal healing wound.  Pain well-controlled.    Plan:  Breast feeding strategies discussed  Pain control measures as needed  Continue home wellbutrin and levothyroxine  Has PP OTC meds  Anticipate discharge tomorrow    Lore Steele     Interval History   Doing well.  Pain is well-controlled.  No fevers.  No history of foul-smelling vaginal discharge.  Good appetite.  Denies chest pain, shortness of breath, nausea or vomiting.  Vaginal bleeding is similar to a heavy menstrual flow.  Ambulatory.  Breastfeeding well.    Medications     fentaNYL (SUBLIMAZE) 2 mcg/mL, ROPivacaine (NAROPIN) 0.1% in NS PIB + PCEA 5 mL/hr at 22 1055     - MEDICATION INSTRUCTIONS -       - MEDICATION INSTRUCTIONS -       oxytocin in 0.9% NaCl       oxytocin in 0.9% NaCl 100 mL/hr (22 1820)       buPROPion  100 mg Oral Daily     docusate sodium  100 mg Oral Daily     levothyroxine  50 mcg Oral Daily       Physical Exam   Temp: 98.1  F (36.7  C) Temp src: Oral BP: 119/73 Pulse: 68   Resp: 16 SpO2: 100 % O2 Device: None (Room air)    There were no vitals filed for this visit.  Vital Signs with Ranges  Temp:  [97.8  F (36.6  C)-98.5  F (36.9  C)] 98.1  F (36.7  C)  Pulse:  [68-77] 68  Resp:  [16-18] 16  BP: ()/(56-94) 119/73  SpO2:  [82 %-100 %] 100 %  I/O last 3 completed shifts:  In: 1525 [P.O.:150; I.V.:1375]  Out: 2392 [Urine:1350; Blood:1042]    Uterine fundus is firm, non-tender and at the level of the umbilicus    Data   Recent Labs   Lab Test 22  0006 21  1335 19  1117   ABO  --   --  A   RH  --   --  Pos   AS Negative   < > Neg    < > = values in this interval not displayed.     Recent Labs   Lab Test 22  0830 22  0006   HGB 10.4* 13.5     Recent Labs   Lab Test 21  1335   KATHY  Positive*

## 2022-03-01 NOTE — L&D DELIVERY NOTE
Delivery Summary    Mechelle Mata MRN# [id number]   Age: 31 year old YOB: 1990     ASSESSMENT & PLAN: Vaginal Delivery Summary    39w1d presented in early labor and was augmented with amezcua and pitocin. PCN for GBS + and adequate treatment. AROM at 6 cm and received epidural for labor. Intermittent cat 2 tracing managed with internal monitors, position changes and turning on and off pitocin. Progressed to complete and pushed for 3 hours with directed pushing for 2 hours.   Had vaginal bleeding with last hour of pushing.        Pushed to deliver viable female infant on 2022 at 1800 with spontaneous cry.   Anterior shoulder delivered with ease followed by posterior shoulder.   Put on mother's chest where delayed cord clamping was done.    Placenta spontaneous with controlled traction on the cord, intact, 3 vessels and Pitocin IV given with good tone.   --most blood loss from the lacerations present.    2nd degree laceration and bilateral sulcus tears infiltrated with 1% lidocaine and repaired using 3-0 Vicryl suture in the usual fashion.  Mother and infant in stable condition.  No complications.    CHAVEZ ZENG MD           Esperanza, Female-Mechelle [[id number]]    Labor Event Times    Labor onset date: 22 Onset time:  1:00 AM   Dilation complete date: 22 Complete time:  2:37 PM   Start pushing date/time: 2022 1440      Labor Length    1st Stage (hrs): 13 (min): 37   2nd Stage (hrs): 3 (min): 23   3rd Stage (hrs): 0 (min): 6      Labor Events     labor?: No  Labor Type: Augmentation, AROM  Predominate monitoring during 1st stage: continuous electronic fetal monitoring     Rupture date/time: 22 0653   Rupture type: Artificial Rupture of Membranes  Fluid color: Clear  Fluid odor: Normal     Induction: Mechanical ripening agent  Induction date/time:     Cervical ripening date/time:        Augmentation: AROM, Oxytocin  Augmentation date/time: 22 1600     "  Delivery/Placenta Date and Time    Delivery Date: 22 Delivery Time:  6:00 PM   Placenta Date/Time: 2022  6:06 PM  Oxytocin given at the time of delivery: after delivery of baby  Delivering clinician: Micheal Ortiz MD   Other personnel present at delivery:  Provider Role   Angela Smith RN Registered Nurse   Linda, Sherry G, RN Registered Nurse         Vaginal Counts     Initial count performed by 2 team members:  Two Team Members   micheal ortiz md, anna rn       Needles Suture Needles Sponges (RETIRED) Instruments   Initial counts 2  5    Added to count  2 5    Relief counts       Final counts             Placed during labor Accounted for at the end of labor   FSE Yes Yes   IUPC Yes Yes   Cervadil NA NA                     Apgars    Living status: Living   1 Minute 5 Minute 10 Minute 15 Minute 20 Minute   Skin color: 1  1       Heart rate: 2  2       Reflex irritability: 2  2       Muscle tone: 2  2       Respiratory effort: 2  2       Total: 9  9       Apgars assigned by: SHERRY HO RN     Cord    Vessels: 3 Vessels    Cord Complications: None               Cord Blood Disposition: Lab    Gases Sent?: No    Delayed cord clamping?: Yes    Cord Clamping Delay (seconds):  seconds    Stem cell collection?: No        Resuscitation    Methods: None   Care at Delivery: Baby placed on mother's chest, dried with warm blankets, stimulated, and hat placed on 's head.   Output in Delivery Room: Voided, Stool     Warren Measurements    Weight: 6 lb 12.3 oz Length: 1' 6\"   Head circumference: 33 cm    Output in delivery room: Voided, Stool     Skin to Skin and Feeding Plan    Skin to skin initiation date/time: 1841    Skin to skin with: Mother  Skin to skin end date/time:        Labor Events and Shoulder Dystocia    Fetal Tracing Prior to Delivery: Category 2  Shoulder dystocia present?: Neg     Delivery (Maternal) (Provider to Complete) (357254)  "   Episiotomy: None  Perineal lacerations: 2nd Repaired?: Yes   Sulcus laceration: bilateral Repaired?: Yes   Repair suture: 3-0 Vicryl  Genital tract inspection done: Pos     Blood Loss  Mother: Mechelle Mata #[id number]   Start of Mother's Information    Delivery Blood Loss  22 0100 - 22    Delivery QBL (mL) Hospital Encounter 47 mL    Postpartum QBL (mL) Hospital Encounter 995 mL    Total  1042 mL         End of Mother's Information  Mother: Mechelle Mata #[id number]          Delivery - Provider to Complete ([id number])    Delivering clinician: Mariam Ortiz MD  Delivery Type (Choose the 1 that will go to the Birth History): , Spontaneous                   Other personnel:  Provider Role   Angela Smith, RN Registered Nurse   Rita Mckeon, RN Registered Nurse                Placenta    Date/Time: 2022  6:06 PM  Removal: Spontaneous  Disposition: Hospital disposal           Anesthesia    Method: Epidural  Cervical dilation at placement: 4-7                Presentation and Position    Presentation: Vertex    Position: Right Occiput Posterior                 MARIAM ORTIZ MD

## 2022-03-01 NOTE — PLAN OF CARE
Data: Mechelle Mata transferred to 7145 via wheelchair at 2020. Baby transferred via parent's arms.  Action: Receiving unit notified of transfer: Yes. Patient and family notified of room change. Report given to Kaylee at 1930. Belongings sent to receiving unit. Accompanied by Registered Nurse. Oriented patient to surroundings. Call light within reach. ID bands double-checked with receiving RN.  Response: Patient tolerated transfer and is stable.

## 2022-03-01 NOTE — LACTATION NOTE
"This note was copied from a baby's chart.  Consult for: Second baby, maternal request with questions about managing engorgement.    History:   vaginal delivery @ 39w1d, AGA  @ 6# 12.3 ounce birthweight, 16 hours old at time of visit. Maternal history of hypothyroid managed with Levothyroxine 50 mcg daily, ADD, depression and anxiety managed with Wellbutrin  mg BID, uncomplicated asthma, postpartum hemorrhage with 1042 mL QBL Hgb 13.5 before delivery to 10.4 after. Mechelle  her first daughter, Suzanne, for 16 months. Suzanne was early term and in NICU first few days, Mechelle started off with lots of pumping and continued pumping at least once daily throughout her first year, ended up with overabundant supply and lots of milk in freezer. She \"had to pump each night before going to bed so didn't wake up so engorged every morning.\"  She had multiple plugged ducts and at least once with mastitis, used sunflower lecithin with some relief.     Breast exam of mom: Soft, symmetric with sore but intact, everted nipples bilaterally.     Oral exam of baby: WNL    Feeding assessment:  Infant sleepy and disinterested, latches and falls asleep prior to arrival. Mom attempts latch with nipple to mouth on earlier visit, shallow latch. With permission, demo nose to nipple positioning and ways to get deeper latch. Baby latched readily and had instant deep, long jaw pulls with swallowing, mom denies pain. Had mom practice relatch with minimal hands on assist then independently again getting wide open latch and great milk transfer.     Education provided: Discussed benefit of nose to nipple positioning with good support, anatomy of breast and infant mouth, tips to get and maintain deeper latch, breast compressions prn to enhance milk transfer, nutritive vs. non-nutritive suck and hearing swallows, benefits of skin to skin and feeding on cue, supply and demand with importance of early days and tips for avoiding over abundant " supply this time. Reviewed baby's second night, how to tell when satiated and if getting enough, what to expect in the coming days and talked in detail about preventing &/or managing engorgement, breastfeeding log with when and who to call if concerns and lactation resources for after discharge. Mom sees Brandi Brice as her PCP and lactation support as well in the past, she's also heard about new LC at [facility] and will see one of them prn.    Feeding Plan: Encourage frequent skin to skin, breastfeed on cue 8 to 12 times per day. Hand expressing ONLY recommended if Merline does not feed well or still cueing after, please do not encourage doing routinely due to history of too much milk with first baby. Follow up with outpatient lactation consultant as needed if any signs of oversupply or other concerns this time.

## 2022-03-01 NOTE — PLAN OF CARE
Dr. Ortiz notified of QBL of 995, will continue to monitor, patient given toradol, IV C/D/I infusing pitocin, ice pack on perineum, bleeding stable, baby sks, anticipate transfer

## 2022-03-01 NOTE — PLAN OF CARE
Goal Outcome Evaluation:          Overall Patient Progress: improving  Pt c/o of some dizziness when up  VSS. Fluids encouraged Hgb was 10.3. She will let us know if dizziness cont.Pt also c/o pernial sorenes . She was given ice pack.

## 2022-03-01 NOTE — PROGRESS NOTES
Anesthesia Post-Partum Follow-Up Note After Vaginal Delivery with Epidural    Patient: Mechelle Mata    Patient location: Post-partum floor    Anesthesia type: Epidural    Subjective  Mechelle Mata does not complain of pruritis at this time. She denies weakness, denies paresthesia, denies difficulties breathing or voiding, denies nausea or vomiting, and denies headache. She is able to ambulate and tolerates regular diet. Patient endorsed a positive anesthesia experience.    Objective  Respiratory Function (RR / SpO2 / Airway Patency): Satisfactory  Cardiac Function (HR / Rhythm / BP): Satisfactory  Strength and sensation lower extremities: Normal  Site of epidural insertion: No signs of infection or inflammation    Most recent vitals  /73   Pulse 68   Temp 36.7  C (98.1  F) (Oral)   Resp 16   LMP 2021   SpO2 100%   Breastfeeding Unknown     Assessment and plan  Mechelle Mata is a 31 year old female  post-partum #1 s/p vaginal delivery. An epidural catheter was successfully inserted and provided labor analgesia via PCEA pump infusing bupivacaine and fentanyl. The patient delivered via  and the epidural catheter was removed immediately thereafter by the L&D RN.     At this time, there is no evidence of adverse side effects associated with the insertion or removal of the epidural catheter. If the patient develops new lower extremity paresis or paresthesias, or if there are concerns regarding the insertion site of the catheter, please reach out to the anesthesia department OB division ([phone]    Thank you for including us in the care for this patient.    Liss Retana DO  Anesthesiology Resident, PGY-2

## 2022-03-01 NOTE — PROVIDER NOTIFICATION
02/28/22 1830   Provider Notification   Provider Name/Title dr mantilla   Method of Notification In Department   Notification Reason Other  (qbl 995)

## 2022-03-01 NOTE — PLAN OF CARE
Problem: Bleeding (Labor)  Goal: Hemostasis  Outcome: Met     Problem: Change in Fetal Wellbeing (Labor)  Goal: Stable Fetal Wellbeing  Outcome: Met  Intervention: Promote and Monitor Fetal Wellbeing  Recent Flowsheet Documentation  Taken 2/28/2022 2100 by Yakelin Talbot RN  Body Position: position changed independently     Problem: Delayed Labor Progression (Labor)  Goal: Effective Progression to Delivery  Outcome: Met     Problem: Infection (Labor)  Goal: Absence of Infection Signs and Symptoms  Outcome: Met     Problem: Labor Pain (Labor)  Goal: Acceptable Pain Control  Outcome: Met     Problem: Uterine Tachysystole (Labor)  Goal: Normal Uterine Contraction Pattern  Outcome: Met   Goal Outcome Evaluation: VSS and pain from uterine contractions well controlled with heat and PRN medications. VSS except some lower BP's which pt denies feeling symptomatic with. Was dizzy earlier in the evening but this has since resolved. Now able to void adequate amounts without difficulty. Breast feeding well without assistance. Bonding well with baby.    Plan of Care Reviewed With: patient     Overall Patient Progress: improving

## 2022-03-02 ENCOUNTER — LACTATION ENCOUNTER (OUTPATIENT)
Age: 32
End: 2022-03-02
Payer: COMMERCIAL

## 2022-03-02 ENCOUNTER — TELEPHONE (OUTPATIENT)
Dept: OBGYN | Facility: CLINIC | Age: 32
End: 2022-03-02
Payer: COMMERCIAL

## 2022-03-02 VITALS
DIASTOLIC BLOOD PRESSURE: 82 MMHG | RESPIRATION RATE: 16 BRPM | OXYGEN SATURATION: 100 % | TEMPERATURE: 97.9 F | SYSTOLIC BLOOD PRESSURE: 124 MMHG | HEART RATE: 69 BPM

## 2022-03-02 PROCEDURE — 250N000013 HC RX MED GY IP 250 OP 250 PS 637

## 2022-03-02 PROCEDURE — 250N000013 HC RX MED GY IP 250 OP 250 PS 637: Performed by: OBSTETRICS & GYNECOLOGY

## 2022-03-02 RX ADMIN — DOCUSATE SODIUM 100 MG: 100 CAPSULE, LIQUID FILLED ORAL at 07:27

## 2022-03-02 RX ADMIN — IBUPROFEN 800 MG: 800 TABLET, FILM COATED ORAL at 05:35

## 2022-03-02 RX ADMIN — BUPROPION HYDROCHLORIDE 100 MG: 100 TABLET, EXTENDED RELEASE ORAL at 08:30

## 2022-03-02 RX ADMIN — ACETAMINOPHEN 650 MG: 325 TABLET, FILM COATED ORAL at 02:44

## 2022-03-02 RX ADMIN — ACETAMINOPHEN 650 MG: 325 TABLET, FILM COATED ORAL at 06:59

## 2022-03-02 RX ADMIN — LEVOTHYROXINE SODIUM 50 MCG: 25 TABLET ORAL at 07:27

## 2022-03-02 NOTE — PLAN OF CARE
Goal Outcome Evaluation:    VSS and postpartum assessments WNL. Pain adequately managed with tylenol and ibuprofen. Breastfeeding independently with good latch. Bonding well with  and responsive to cues. Expresses readiness for transition to home. Follow-up instructions reviewed, discharge education completed and questions answered, patient verbalizes understanding. Home medications given and instructions reviewed. Discharged to home with baby.          .

## 2022-03-02 NOTE — DISCHARGE SUMMARY
[facility]    Discharge Summary  Obstetrics    Date of Admission:  2022  Date of Discharge:  3/2/2022  Discharging Provider: Shona Aden    Discharge Diagnoses   S/p vaginal birth after   Postpartum hemorrhage secondary to vaginal lacerations    History of Present Illness   Mechelle Mata is a 31 year old female who presented with early labor and desired TOLAC.  She was admitted and labor was augmented with amezcua bulb and pitocin. She was given PCN for GBS + status.     She progressed to complete and pushed for 3 hours to deliver a viable female infant.  EBL 995cc secondary to lacerations which were repaired.    Hospital Course   The patient's hospital course was unremarkable.  She recovered as anticipated and experienced no post-delivery complications. On discharge, her pain was well controlled. Vaginal bleeding is similar to peak menstrual flow.  Voiding without difficulty.  Ambulating well and tolerating a normal diet.  No fevers.  Breastfeeding well.  Infant is stable.  She was discharged on post-partum day #2.    Post-partum hemoglobin:   Hemoglobin   Date Value Ref Range Status   2022 10.4 (L) 11.7 - 15.7 g/dL Final   2019 12.1 11.7 - 15.7 g/dL Final       K[place]ogg Depression Scale  Thoughts of Harming Self:    Total Score:        Shona Aden MD    Discharge Disposition   Discharged to home   Condition at discharge: Stable    Primary Care Physician   Gauri Brice    Consultations This Hospital Stay   LACTATION IP CONSULT  ANESTHESIOLOGY IP CONSULT  HOME CARE POST PARTUM/ IP CONSULT    Discharge Orders      Physical Therapy Referral      Breast pump - Manual/Electric    Breast Pump Documentation:  Manual/Electric Pump: To support adequate breast milk production and nutrition for infant.     I, the undersigned, certify that the above prescribed supplies are medically necessary for this patient and is both reasonable and  necessary in reference to accepted standards of medical and necessary in reference to accepted standards of medical practice in the treatment of this patient's condition and is not prescribed as a convenience.     Discharge Medications   Current Discharge Medication List      START taking these medications    Details   acetaminophen (TYLENOL) 325 MG tablet Take 2 tablets (650 mg) by mouth every 6 hours as needed for mild pain Start after Delivery.    Associated Diagnoses:  (vaginal birth after )      ibuprofen (ADVIL/MOTRIN) 600 MG tablet Take 1 tablet (600 mg) by mouth every 6 hours as needed for moderate pain Start after delivery    Associated Diagnoses:  (vaginal birth after )      senna-docusate (SENOKOT-S/PERICOLACE) 8.6-50 MG tablet Take 1 tablet by mouth daily Start after delivery.    Associated Diagnoses:  (vaginal birth after )         CONTINUE these medications which have CHANGED    Details   !! buPROPion (WELLBUTRIN SR) 100 MG 12 hr tablet Take 1 tablet (100 mg) by mouth daily  Qty: 50 tablet, Refills: 0    Comments: Name brand only. To be filled for discharge to replaced meds spilled at hospital. No charge to patient.  Associated Diagnoses:  (vaginal birth after )      !! WELLBUTRIN  MG 12 hr tablet Take 1 tablet (100 mg) by mouth 2 times daily  Qty: 50 tablet, Refills: 0    Associated Diagnoses: Major depressive disorder, single episode, in remission (H)       !! - Potential duplicate medications found. Please discuss with provider.      CONTINUE these medications which have NOT CHANGED    Details   albuterol (PROVENTIL HFA) 108 (90 Base) MCG/ACT inhaler Inhale 2 puffs into the lungs every 6 hours as needed for shortness of breath / dyspnea or wheezing  Qty: 18 g, Refills: 5    Comments: Pharmacy may dispense brand covered by insurance (Proair, or proventil or ventolin or generic albuterol inhaler)  Associated Diagnoses: Mild intermittent asthma  without complication      amoxicillin (AMOXIL) 250 MG capsule Take 1 capsule (250 mg) by mouth once as needed (after coitus)  Qty: 30 capsule, Refills: 3    Associated Diagnoses: History of UTI      clindamycin (CLINDAMAX) 1 % external gel Apply topically 2 times daily  Qty: 60 g, Refills: 3    Associated Diagnoses: Acne, unspecified acne type      docusate sodium (COLACE) 50 MG capsule Take 50 mg by mouth 2 times daily      doxylamine (UNISOM) 25 MG TABS tablet Take 25 mg by mouth At Bedtime      levothyroxine (SYNTHROID/LEVOTHROID) 50 MCG tablet Take 1 tablet (50 mcg) by mouth daily  Qty: 90 tablet, Refills: 3    Associated Diagnoses: Hypothyroidism, unspecified type      Prenatal Vit-Fe Fumarate-FA (PRENATAL PO)       escitalopram (LEXAPRO) 10 MG tablet Take 0.5 tablets (5 mg) by mouth daily for 14 days, THEN 1 tablet (10 mg) daily.  Qty: 97 tablet, Refills: 1    Associated Diagnoses: Anxiety      metoclopramide (REGLAN) 5 MG tablet Take 1 tablet (5 mg) by mouth 4 times daily (before meals and nightly)  Qty: 60 tablet, Refills: 1    Associated Diagnoses: Nausea and vomiting during pregnancy         STOP taking these medications       pyridOXINE (VITAMIN  B-6) 25 MG tablet Comments:   Reason for Stopping:             Allergies   No Known Allergies

## 2022-03-02 NOTE — LACTATION NOTE
This note was copied from a baby's chart.  Follow up visit this morning per mom request, more discomfort overnight and sometimes lipstick shape to nipple. We reviewed nose to nipple positioning before they start and tips for deeper latch, plan to call back at time of feeding. Repeat oral exam, no concerns all feel WNL with organized suck on finger.     Return visit -Mechelle had baby latched well and no pain on arrival, this second side said went better again this time. Baby finished feed during visit, nipple fully rounded when she came off. Offer support and encouragement, reminder for outpatient LC follow up as needed.

## 2022-03-02 NOTE — TELEPHONE ENCOUNTER
----- Message from Vincenzo Aden MD sent at 3/2/2022  8:35 AM CST -----  Regarding: postpartum  Please contact Mechelle Mata to schedule a 6 week postpartum visit with Dr. Ortiz or her preferred MD.  She is discharging from the hospital today, 3/2/22.    Thanks,  VINCENZO ADEN MD

## 2022-03-02 NOTE — PLAN OF CARE
Data: Vital signs within normal limits. Postpartum checks within normal limits - see flow record. Patient eating and drinking normally. Patient able to empty bladder independently and is up ambulating. No apparent signs of infection. Patient performing self cares and is able to care for infant. Pt is breastfeeding independently.  Action: Patient medicated during the shift for cramping. See MAR. Patient reassessed within 1 hour after each medication and pain was improved - patient stated she was comfortable. Patient education done about pain and cramping in association with breastfeeding. See flow record.  Response: Positive attachment behaviors observed with infant. Support persons are present.   Plan: Anticipate discharge on 3/2/22.

## 2022-03-02 NOTE — PLAN OF CARE
Data: Vital signs within normal limits. Postpartum checks within normal limits - see flow record. Patient eating and drinking normally. Patient able to empty bladder independently and is up ambulating. No apparent signs of infection.  Perineum  healing well. Patient performing self cares and is able to care for infant.  Action: Patient medicated during the shift for cramping. See MAR. Patient reassessed within 1 hour after each medication and pain was improved - patient stated she was comfortable. Patient education done about pain control, hand expression. See flow record.  Response: Positive attachment behaviors observed with infant. Support persons are present.   Plan: Anticipate discharge on Wednesday.Goal Outcome Evaluation:    Plan of Care Reviewed With: patient, spouse     Overall Patient Progress: improving

## 2022-03-02 NOTE — PROGRESS NOTES
[facility]    Post-Partum Progress Note    Assessment & Plan   Assessment:  Post-partum day #2  Vaginal delivery after   Postpartum hemorrhage secondary to vaginal laceration    Doing well.  No excessive bleeding  Pain well-controlled.  No need for iron supplementation    Plan:  Discharge later today    hSona Aden     Interval History   Doing well.  Pain is well-controlled with continued sore bottom.  No fevers.  No history of foul-smelling vaginal discharge.  Good appetite.  Denies chest pain, shortness of breath, nausea or vomiting.  Vaginal bleeding is similar to a heavy menstrual flow.  Ambulatory.  Breastfeeding well.    Medications     fentaNYL (SUBLIMAZE) 2 mcg/mL, ROPivacaine (NAROPIN) 0.1% in NS PIB + PCEA 5 mL/hr at 22 1055     - MEDICATION INSTRUCTIONS -       - MEDICATION INSTRUCTIONS -       oxytocin in 0.9% NaCl       oxytocin in 0.9% NaCl 100 mL/hr (22 1820)       buPROPion  100 mg Oral Daily     docusate sodium  100 mg Oral Daily     levothyroxine  50 mcg Oral Daily       Physical Exam   Temp: 97.9  F (36.6  C) Temp src: Oral BP: 124/82 Pulse: 69   Resp: 16 SpO2: 100 % O2 Device: None (Room air)    There were no vitals filed for this visit.  Vital Signs with Ranges  Temp:  [97.5  F (36.4  C)-98.1  F (36.7  C)] 97.9  F (36.6  C)  Pulse:  [68-76] 69  Resp:  [16] 16  BP: (119-127)/(70-82) 124/82  SpO2:  [100 %] 100 %  No intake/output data recorded.    Uterine fundus is firm, non-tender and at the level of the umbilicus    Data   Recent Labs   Lab Test 22  0006 21  1335 19  1117   ABO  --   --  A   RH  --   --  Pos   AS Negative   < > Neg    < > = values in this interval not displayed.     Recent Labs   Lab Test 22  0830 22  0006   HGB 10.4* 13.5     Recent Labs   Lab Test 21  1335   RUQIGG Positive*

## 2022-03-03 ENCOUNTER — PATIENT OUTREACH (OUTPATIENT)
Dept: CARE COORDINATION | Facility: CLINIC | Age: 32
End: 2022-03-03
Payer: COMMERCIAL

## 2022-03-03 DIAGNOSIS — Z71.89 OTHER SPECIFIED COUNSELING: ICD-10-CM

## 2022-03-03 NOTE — PROGRESS NOTES
Clinic Care Coordination Contact  [facility]/Voicemail       Clinical Data: Care Coordinator Outreach  Outreach attempted x 1.  Left message on patient's voicemail with call back information and requested return call.  Plan:  Care Coordinator will try to reach patient again in 1-2 business days.        KEVEN Huertas  [phone]  [facility]

## 2022-03-04 ENCOUNTER — NURSE TRIAGE (OUTPATIENT)
Dept: NURSING | Facility: CLINIC | Age: 32
End: 2022-03-04
Payer: COMMERCIAL

## 2022-03-04 NOTE — PROGRESS NOTES
Clinic Care Coordination Contact    Background: Care Coordination referral placed from [facility] discharge report for reason of patient meeting criteria for a TCM outreach call by [facility] team.    Assessment: Upon chart review, [facility] Team member will cancel/close the referral for TCM outreach due to reason below:    Patient has a follow up appointment with an appropriate provider today for hospital discharge.  Patient was on the way out the door to follow up appt.    Plan: Care Coordination referral for TCM outreach canceled.    Cara Mari MA  [facility], [facility]
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
## 2022-03-08 ENCOUNTER — OFFICE VISIT (OUTPATIENT)
Dept: OBGYN | Facility: CLINIC | Age: 32
End: 2022-03-08
Payer: COMMERCIAL

## 2022-03-08 VITALS
WEIGHT: 180.9 LBS | DIASTOLIC BLOOD PRESSURE: 81 MMHG | OXYGEN SATURATION: 98 % | SYSTOLIC BLOOD PRESSURE: 122 MMHG | BODY MASS INDEX: 27.91 KG/M2 | HEART RATE: 81 BPM

## 2022-03-08 DIAGNOSIS — R35.0 URINARY FREQUENCY: ICD-10-CM

## 2022-03-08 DIAGNOSIS — N30.01 ACUTE CYSTITIS WITH HEMATURIA: ICD-10-CM

## 2022-03-08 DIAGNOSIS — R39.15 URINARY URGENCY: ICD-10-CM

## 2022-03-08 LAB
ALBUMIN UR-MCNC: NEGATIVE MG/DL
APPEARANCE UR: CLEAR
BACTERIA #/AREA URNS HPF: ABNORMAL /HPF
BILIRUB UR QL STRIP: NEGATIVE
COLOR UR AUTO: YELLOW
GLUCOSE UR STRIP-MCNC: NEGATIVE MG/DL
HGB UR QL STRIP: ABNORMAL
KETONES UR STRIP-MCNC: NEGATIVE MG/DL
LEUKOCYTE ESTERASE UR QL STRIP: ABNORMAL
NITRATE UR QL: NEGATIVE
PH UR STRIP: 5.5 [PH] (ref 5–7)
RBC #/AREA URNS AUTO: ABNORMAL /HPF
SP GR UR STRIP: 1.02 (ref 1–1.03)
SQUAMOUS #/AREA URNS AUTO: ABNORMAL /LPF
UROBILINOGEN UR STRIP-ACNC: 0.2 E.U./DL
WBC #/AREA URNS AUTO: ABNORMAL /HPF

## 2022-03-08 PROCEDURE — 87088 URINE BACTERIA CULTURE: CPT | Performed by: OBSTETRICS & GYNECOLOGY

## 2022-03-08 PROCEDURE — 99024 POSTOP FOLLOW-UP VISIT: CPT | Performed by: OBSTETRICS & GYNECOLOGY

## 2022-03-08 PROCEDURE — 87086 URINE CULTURE/COLONY COUNT: CPT | Performed by: OBSTETRICS & GYNECOLOGY

## 2022-03-08 PROCEDURE — 81001 URINALYSIS AUTO W/SCOPE: CPT | Performed by: OBSTETRICS & GYNECOLOGY

## 2022-03-08 RX ORDER — ESTRADIOL 0.1 MG/G
CREAM VAGINAL
Qty: 42.5 G | Refills: 0 | Status: SHIPPED | OUTPATIENT
Start: 2022-03-08 | End: 2023-06-06

## 2022-03-08 NOTE — PROGRESS NOTES
"GYN Problem Visit                                                  HPI:  Mechelle Mata is a 31 year old female who presents to clinic today complaining of concerns regarding perineal lac repair     on .  2nd degree laceration and bilateral sulcus tears infiltrated with 1% lidocaine and repaired using 3-0 Vicryl suture in the usual fashion.    Sent "[employer]" message yesterday:  \"There seems to be something kind of resembling a skin tag by where the stitches were. It s maybe 1/2cm but hanging down near my vagina. It doesn t feel like it s filled with anything but I may be bleeding there. Just about everything in the area feels okay now except for that, which can vary between mildly painful, irritating, or not bothering me.\"    Noticed a sore area near repair and feels a little flap of tissue that's more sensitive than the rest.    ROS:  Urinary urgency and frequency.  Not sure if it's UTI or pelvic floor related.    EXAM:  Last menstrual period 2021, unknown if currently breastfeeding.   BMI= There is no height or weight on file to calculate BMI.  General - pleasant female in no acute distress.  Pelvic - external genitalia: normal adult female.  Small triangular area of tissue at edge of vaginal mucosa appearing not repaired/attached.  Tissue appears to be healing.     Rectovaginal - deferred.  Musculoskeletal - no gross deformities.  Neurological - normal strength, sensation, and mental status.      Assessment:    Mechelle Mata is a 31 year old  who presents today to discuss perineal laceration.    Plan:  1. Obstetrical laceration  Reviewed exam findings today.  Discussed that I do not feel putting a stitch is appropriate at this point; believe there's higher chance of failure of this method.  Recommended application of vaginal healing to assist in healing and decrease sensitivity of this area.  There's a chance it may not heal back together, in which case this tissue flap could be removed later " on.  She expresses understanding.    - conjugated estrogens (PREMARIN) 0.625 MG/GM vaginal cream; Place a small pea-sized amount on the healing spot of vulva nightly for two weeks.  If it doesn't feel healed after first two weeks, then apply twice weekly until post-partum visit.  Dispense: 30 g; Refill: 0  - estradiol (ESTRACE) 0.1 MG/GM vaginal cream; Place a small pea-sized amount on the healing spot of vulva nightly for two weeks.  If it doesn't feel healed after first two weeks, then apply twice weekly until post-partum visit.  Dispense: 42.5 g; Refill: 0    2. Urinary frequency  3. Urinary urgency  - UA with Microscopic reflex to Culture - lab collect; Future      RTC for post-partum visit, sooner prn.      Zuri Dumont MD

## 2022-03-09 ENCOUNTER — MYC MEDICAL ADVICE (OUTPATIENT)
Dept: OBGYN | Facility: CLINIC | Age: 32
End: 2022-03-09
Payer: COMMERCIAL

## 2022-03-09 LAB
BACTERIA UR CULT: ABNORMAL
BACTERIA UR CULT: ABNORMAL

## 2022-03-09 RX ORDER — CEPHALEXIN 500 MG/1
500 CAPSULE ORAL 2 TIMES DAILY
Qty: 10 CAPSULE | Refills: 0 | Status: SHIPPED | OUTPATIENT
Start: 2022-03-09 | End: 2022-03-14

## 2022-03-28 ENCOUNTER — MYC MEDICAL ADVICE (OUTPATIENT)
Dept: OBGYN | Facility: CLINIC | Age: 32
End: 2022-03-28
Payer: COMMERCIAL

## 2022-04-12 ENCOUNTER — THERAPY VISIT (OUTPATIENT)
Dept: PHYSICAL THERAPY | Facility: CLINIC | Age: 32
End: 2022-04-12
Attending: OBSTETRICS & GYNECOLOGY
Payer: COMMERCIAL

## 2022-04-12 DIAGNOSIS — O34.219 VBAC (VAGINAL BIRTH AFTER CESAREAN): ICD-10-CM

## 2022-04-12 PROBLEM — M62.89 PELVIC FLOOR DYSFUNCTION: Status: ACTIVE | Noted: 2020-07-03

## 2022-04-12 PROCEDURE — 97110 THERAPEUTIC EXERCISES: CPT | Mod: GP | Performed by: PHYSICAL THERAPIST

## 2022-04-12 PROCEDURE — 97161 PT EVAL LOW COMPLEX 20 MIN: CPT | Mod: GP | Performed by: PHYSICAL THERAPIST

## 2022-04-12 PROCEDURE — 97530 THERAPEUTIC ACTIVITIES: CPT | Mod: GP | Performed by: PHYSICAL THERAPIST

## 2022-04-12 NOTE — PROGRESS NOTES
Physical Therapy Initial Evaluation  Subjective:  The history is provided by the patient. No  was used.   Patient Health History  Mechelle Mata being seen for Eval after  of pelvic floor.     Date of Onset: 22.   Problem occurred: Pregnancy and childbirth   Pain is reported as 0/10 on pain scale.  General health as reported by patient is good.  Pertinent medical history includes: asthma, depression, migraines/headaches and thyroid problems.   Red flags:  None as reported by patient.  Medical allergies: none.   Surgeries include:  Other. Other surgery history details: C section, ganglian cyst removal, wisdom teeth.    Current medications:  Anti-depressants and thyroid medication.    Current occupation is Behavioral health therapist.   Primary job tasks include:  Computer work and prolonged sitting.                  Patient Health History  Mechelle Mata being seen for Eval after  of pelvic floor.     Date of Onset: 22.   Problem occurred: Pregnancy and childbirth   Pain is reported as 0/10 on pain scale.  General health as reported by patient is good.  Pertinent medical history includes: asthma, depression, migraines/headaches and thyroid problems.   Red flags:  None as reported by patient.  Medical allergies: none.   Surgeries include:  Other. Other surgery history details: C section, ganglian cyst removal, wisdom teeth.    Current medications:  Anti-depressants and thyroid medication.    Current occupation is Behavioral health therapist.   Primary job tasks include:  Computer work and prolonged sitting.                   Therapist Assessment:   Clinical Impression: Pt presents with primary complaint of vaginal pain after walking for 30 minutes.  Per clinical examination, pt with overactivity and tension of pelvic floor muscles, as well as weakness of proximal muscles.  Pt will benefit from skilled physical therapy for down training/coordination training of pelvic floor muscles  and proximal strengthening.    Chief Complaint:  The pt presents today s/p  on 22.  The pt had gestational hypertension with her first child. Initially she was induced but up needing a . A few months later she started having abdominal pain along her  incision and during this time she would have UTI without any bacterial infection. She was taking amoxicillin after sex, which has helped a lot. Since her second delivery on 22 she has not had intercourse. With her second pregnancy she did not have gestational diabetes. She had a slow laboring over a couple of days, and had 3 hours and 20 minutes of pushing. The pt has a second degree tear and a sulcus tear. She has noted having vaginal pain after walking for 30 minutes. She is noticing vaginal burning and itching. The pt is currently breastfeeding.    Goals: return to running, no vaginal pain, no pain with intercourse    Urination   Do you leak on the way to the bathroom or with a strong urge to void? no  Do you leak with cough, sneeze, jumping, running? no  Any other activities that cause leaking? no  Do you have triggers that make you feel you can't wait to go to the bathroom? no What are they? n/a  Type of pad and number used per day? n/a  When you leak what is the amount? n/a  How long can you delay the need to urinate? Not answered  How many times do you get up to urinate at night? When nursing 1x  How many times do you urinate during the day? Not asked  Can you stop the flow of urine when on the toilet? yes  Is the volume of urine passed usually: medium  Do you strain to pass urine? no  Do you have a slow or hesitant urinary stream? no  Do you have difficulty initiating the urine stream? no  How many bladder infections have you had in the last 12 months? 1  What is you fluid intake per day? Water (8oz) 10  Caffeine 12oz  Alcohol 10oz 1x a week beer  Do you feel like you empty completely when voiding? yes    Bowel Habits  Frequency  of bowel movements? 2-3x a day  Consistency of stool? [place] stool scale? Type 1-4  Do you ignore the urge to defecate? no  Do you strain to pass stool? sometimes  Do you feel that you empty completely? yes  She is taking senna daily and a probiotic daily.  No pain with bowel movements.    Pelvic Pain  When do you have pelvic pain? After walking for about 30 minutes, if she does a kegel and tra activation it will help with the discomfort  Are you sexually active? Not since birth  Is initial penetration during intercourse painful? Was after first delivery  Is deeper penetration painful? Was after first delivery, some pain with deep penetration during end of pregnancy  Do you use lubrication? no  Marinoff Scale:Level 1  (Level 3: Abstinence from intercourse because of severe pain. Level 2: Painful intercourse which limites frequency of activity. Level 1: Painful intercourse not severe enough to prevent activity.)  Have you given birth? 1 vaginal delivery, 1   Have you ever been worried for your physical safety? no  Any abdominal or pelvic surgeries? Yes   Are you having regular exercise? Following an instagram postpartum exercises including transverse abdominis, glute work and breathing exercises.  Have you practiced the PF (kegel) exercise for 4 or more weeks? yes    Objective:    POSTURE: slight forward head    FLEXIBILITY:tightness of adductor muscle group R>L    ABDOMINAL WALL: 1.5 finger width at umbilicus, no doming/tending of abdomen noted    EXTERNAL ASSESSMENT:  Skin condition:normal  Bearing down/coughing:no bulge with cue to cough  Muscle contraction/perineal mobility: elevation and urogenital triangle descent     INTERNAL VAGINAL ASSESSMENT:  Baseline PF tone: hyper   PF Tone with cough: NT   PF Response quality: good relaxation after contraction  PF Power: Center: 2+/5  Accessory Muscle use: n/a  Endurance: Maximum contraction in seconds:not tested   Quick contraction repetitions prior  to fatigue: not tested  Palpation: tenderness to palpation of: bulbocavernosus, ischiocavernosus, and levator ani bilaterally, scar tissue noted along 6 o'clock at introitus  Pain reproduced by palpation of muscles and scar tissue, reduced with contract/relax of pelvic floor muscles and with diaphragmatic breathing.      Hip MMT:   Flexion: Left: 5/5, Right: 5/5   Extension: Left: 5-/5, Right: 5-/5   Abduction: Left: 4/5, Right: 4-/5   IR: Left: 5/5, Right: 5/5/   ER: Left: 4/5, Right: 4/5    Special Tests:   Active Straight Leg Raise: positive on right side   Trendelenburg: positive on right side     SIJ Special Tests:   Compression: negative   Thigh Thrust: negative bilaterally       Assessment/Plan:    Patient is a 31 year old female with pelvic complaints.    Patient has the following significant findings with corresponding treatment plan.                Diagnosis 1:  Pelvic floor dysfunction  Pain -  hot/cold therapy, US, electric stimulation, mechanical traction, manual therapy, STS, splint/taping/bracing/orthotics, self management, education, directional preference exercise and home program  Decreased ROM/flexibility - manual therapy, therapeutic exercise, therapeutic activity and home program  Decreased strength - therapeutic exercise, therapeutic activities and home program  Impaired muscle performance - biofeedback, electric stimulation, neuro re-education and home program    Therapy Evaluation Codes:   Cumulative Therapy Evaluation is: Low complexity.    Previous and current functional limitations:  (See Goal Flow Sheet for this information)    Short term and Long term goals: (See Goal Flow Sheet for this information)     Communication ability:  Patient appears to be able to clearly communicate and understand verbal and written communication and follow directions correctly.  Treatment Explanation - The following has been discussed with the patient:   RX ordered/plan of care  Anticipated outcomes  Possible  risks and side effects  This patient would benefit from PT intervention to resume normal activities.   Rehab potential is good.    Frequency:  2 X a month, once daily  Duration:  for 6 months  Discharge Plan:  Achieve all LTG.  Independent in home treatment program.  Reach maximal therapeutic benefit.    Please refer to the daily flowsheet for treatment today, total treatment time and time spent performing 1:1 timed codes.

## 2022-04-13 ENCOUNTER — PRENATAL OFFICE VISIT (OUTPATIENT)
Dept: OBGYN | Facility: CLINIC | Age: 32
End: 2022-04-13
Payer: COMMERCIAL

## 2022-04-13 VITALS
HEIGHT: 68 IN | SYSTOLIC BLOOD PRESSURE: 117 MMHG | OXYGEN SATURATION: 98 % | DIASTOLIC BLOOD PRESSURE: 71 MMHG | HEART RATE: 72 BPM | WEIGHT: 175 LBS | BODY MASS INDEX: 26.52 KG/M2

## 2022-04-13 PROBLEM — Z23 NEED FOR TDAP VACCINATION: Status: RESOLVED | Noted: 2021-07-14 | Resolved: 2022-04-13

## 2022-04-13 PROBLEM — Z87.440 PERSONAL HISTORY OF URINARY TRACT INFECTION: Status: RESOLVED | Noted: 2020-08-12 | Resolved: 2022-04-13

## 2022-04-13 PROBLEM — Z34.03 ENCOUNTER FOR SUPERVISION OF NORMAL FIRST PREGNANCY IN THIRD TRIMESTER: Status: RESOLVED | Noted: 2022-02-27 | Resolved: 2022-04-13

## 2022-04-13 PROBLEM — Z36.89 ENCOUNTER FOR TRIAGE IN PREGNANT PATIENT: Status: RESOLVED | Noted: 2022-02-27 | Resolved: 2022-04-13

## 2022-04-13 PROBLEM — K59.00 CONSTIPATION, UNSPECIFIED CONSTIPATION TYPE: Status: RESOLVED | Noted: 2020-08-12 | Resolved: 2022-04-13

## 2022-04-13 PROBLEM — O47.9 UTERINE CONTRACTIONS DURING PREGNANCY: Status: RESOLVED | Noted: 2022-02-25 | Resolved: 2022-04-13

## 2022-04-13 PROBLEM — Z98.891 S/P CESAREAN SECTION: Status: RESOLVED | Noted: 2019-11-19 | Resolved: 2022-04-13

## 2022-04-13 PROCEDURE — 99207 PR POST PARTUM EXAM: CPT | Performed by: OBSTETRICS & GYNECOLOGY

## 2022-04-13 RX ORDER — ACETAMINOPHEN AND CODEINE PHOSPHATE 120; 12 MG/5ML; MG/5ML
0.35 SOLUTION ORAL DAILY
Qty: 112 TABLET | Refills: 3 | Status: SHIPPED | OUTPATIENT
Start: 2022-04-13 | End: 2023-05-11

## 2022-04-13 ASSESSMENT — ANXIETY QUESTIONNAIRES
GAD7 TOTAL SCORE: 1
7. FEELING AFRAID AS IF SOMETHING AWFUL MIGHT HAPPEN: NOT AT ALL
6. BECOMING EASILY ANNOYED OR IRRITABLE: SEVERAL DAYS
1. FEELING NERVOUS, ANXIOUS, OR ON EDGE: NOT AT ALL
2. NOT BEING ABLE TO STOP OR CONTROL WORRYING: NOT AT ALL
3. WORRYING TOO MUCH ABOUT DIFFERENT THINGS: NOT AT ALL
5. BEING SO RESTLESS THAT IT IS HARD TO SIT STILL: NOT AT ALL

## 2022-04-13 ASSESSMENT — PATIENT HEALTH QUESTIONNAIRE - PHQ9
SUM OF ALL RESPONSES TO PHQ QUESTIONS 1-9: 1
5. POOR APPETITE OR OVEREATING: NOT AT ALL

## 2022-04-13 NOTE — PROGRESS NOTES
"SUBJECTIVE:  Mechelle Mata is a 31 year old female  here for a postpartum visit.  She had a  on 2022 delivering a healthy baby girl weighing 6 lbs 12 oz at term.      Today's Depression Rating was 1    delivery complications:  No  breast feeding:  Yes, going well  bladder problems:  No  bowel problems/hemorrhoids:  No  episiotomy/laceration/incision healed? Yes: and doing PT  vaginal flow:  None  [place]:  No  contraception:  oral contraceptive  emotional adjustment:  doing well and happy  back to work:  2022    OBJECTIVE:  Blood pressure 117/71, pulse 72, height 1.715 m (5' 7.5\"), weight 79.4 kg (175 lb), last menstrual period 2021, SpO2 98 %, currently breastfeeding.   General - pleasant female in no acute distress.  Breast - deferred.  Abdomen - soft, nontender, nondistended.  Pelvic - EG: normal adult female, BUS: within normal limits, Vagina: very atrophic, no discharge, at introitus has a skin bridge (see images) very thin    ASSESSMENT:  normal postpartum exam  Skin bridge    PLAN:  May resume normal activities without restrictions  Pap smear was not  done today    Has estrogen cream at home and discussed applying to skin bridge and then using massage to release tissue. If that does not work, could inject with lidocaine and take it down in clinic. Answered questions and she will continue to work with PT.     The patient will use oral contraceptive for birth control. Full counseling was provided, and all questions answered. Compliance is strongly emphasized.  Return to clinic in one year for an annual or PRN.    CHAVEZ ZENG MD    "
10

## 2022-04-13 NOTE — PATIENT INSTRUCTIONS
I sent the prescription to the pharmacy indicated.   Remember to use condoms for first 2 weeks of taking the progesterone only pill and that you need to take this pill about the same time everyday or you will have bleeding/spotting.   Also unlike a regular pill, every pill in the pack contains progesterone, so don't throw out the last week--take those tablets too!  :)    If you start to wean or are breastfeeding/pumping only 2 times a day, we should switch you over to a regular estrogen/progesterone pill at that point for effectiveness, so let me know.

## 2022-04-14 ASSESSMENT — ANXIETY QUESTIONNAIRES: GAD7 TOTAL SCORE: 1

## 2022-04-19 ASSESSMENT — ANXIETY QUESTIONNAIRES
1. FEELING NERVOUS, ANXIOUS, OR ON EDGE: NOT AT ALL
7. FEELING AFRAID AS IF SOMETHING AWFUL MIGHT HAPPEN: NOT AT ALL
4. TROUBLE RELAXING: NOT AT ALL
6. BECOMING EASILY ANNOYED OR IRRITABLE: SEVERAL DAYS
GAD7 TOTAL SCORE: 1
2. NOT BEING ABLE TO STOP OR CONTROL WORRYING: NOT AT ALL
GAD7 TOTAL SCORE: 1
5. BEING SO RESTLESS THAT IT IS HARD TO SIT STILL: NOT AT ALL
3. WORRYING TOO MUCH ABOUT DIFFERENT THINGS: NOT AT ALL
7. FEELING AFRAID AS IF SOMETHING AWFUL MIGHT HAPPEN: NOT AT ALL
GAD7 TOTAL SCORE: 1

## 2022-04-20 ASSESSMENT — ANXIETY QUESTIONNAIRES: GAD7 TOTAL SCORE: 1

## 2022-04-25 ENCOUNTER — VIRTUAL VISIT (OUTPATIENT)
Dept: FAMILY MEDICINE | Facility: CLINIC | Age: 32
End: 2022-04-25
Payer: COMMERCIAL

## 2022-04-25 DIAGNOSIS — F32.5 MAJOR DEPRESSIVE DISORDER, SINGLE EPISODE, IN REMISSION (H): Primary | ICD-10-CM

## 2022-04-25 DIAGNOSIS — F41.9 ANXIETY: ICD-10-CM

## 2022-04-25 DIAGNOSIS — J45.20 MILD INTERMITTENT ASTHMA WITHOUT COMPLICATION: ICD-10-CM

## 2022-04-25 DIAGNOSIS — F98.8 ATTENTION DEFICIT DISORDER (ADD) WITHOUT HYPERACTIVITY: ICD-10-CM

## 2022-04-25 DIAGNOSIS — E03.9 HYPOTHYROIDISM, UNSPECIFIED TYPE: ICD-10-CM

## 2022-04-25 PROCEDURE — 99215 OFFICE O/P EST HI 40 MIN: CPT | Mod: GT | Performed by: NURSE PRACTITIONER

## 2022-04-25 RX ORDER — ALBUTEROL SULFATE 90 UG/1
2 AEROSOL, METERED RESPIRATORY (INHALATION) EVERY 6 HOURS PRN
Qty: 18 G | Refills: 5 | Status: SHIPPED | OUTPATIENT
Start: 2022-04-25 | End: 2024-02-05

## 2022-04-25 RX ORDER — BUPROPION HYDROCHLORIDE 100 MG/1
100 TABLET, EXTENDED RELEASE ORAL DAILY
Qty: 7 TABLET | Refills: 0 | Status: SHIPPED | OUTPATIENT
Start: 2022-04-25 | End: 2022-05-02

## 2022-04-25 ASSESSMENT — ASTHMA QUESTIONNAIRES: ACT_TOTALSCORE: 22

## 2022-04-25 NOTE — PROGRESS NOTES
Mechelle is a 31 year old who is being evaluated via a billable video visit.      How would you like to obtain your AVS? MyChart  If the video visit is dropped, the invitation should be resent by: Send to e-mail at: [email or web address] [phone]   Will anyone else be joining your video visit? No    Video Start Time: 10:33 AM      Assessment & Plan     Major depressive disorder, single episode, in remission (H)  Three options reviewed related to expense of the current brand name Wellbutrin  mg.  One is to discontinue which patient is interested in big picture, but delay until after has only limited time left at current unsupportive job.  Another is to try bupropion (generic)  mg and see if this causes the same dream disturbance as the generic XL version. Finally, we can switch to the brand Wellbutrin 150 mg XL which had caused increased anxiety in pregnancy, but may not now that postpartum.  Ultimately, patient wants to try seven days of the generic bupropion  mg.  Rx for this.  Patient will call in with results and I will refill the preferred formulation and dose based on her report.  Continue therapy.  - buPROPion (WELLBUTRIN SR) 100 MG 12 hr tablet; Take 1 tablet (100 mg) by mouth daily    Anxiety  - buPROPion (WELLBUTRIN SR) 100 MG 12 hr tablet; Take 1 tablet (100 mg) by mouth daily    Major depressive disorder, recurrent episode, moderate (H)  Has been in remission for years. Removing from problem list and leaving remission dx    Mild intermittent asthma without complication  ACT shows good control.  Last albuterol refill was in 2020.  REfilled  - albuterol (PROVENTIL HFA) 108 (90 Base) MCG/ACT inhaler; Inhale 2 puffs into the lungs every 6 hours as needed for shortness of breath / dyspnea or wheezing    Hypothyroidism, unspecified type  Clinically and diagnostically euthyroid. Recheck 2/2023    Attention deficit disorder (ADD) without hyperactivity  Patient will have DA records from  "previous psychiatrist sent over.  Consider concerta 27 mg restart at next visit in three months prior to new job start.      Prescription drug management  55 minutes spent on the date of the encounter doing chart review, history and exam, documentation and further activities per the note       BMI:   Estimated body mass index is 27 kg/m  as calculated from the following:    Height as of 4/13/22: 1.715 m (5' 7.5\").    Weight as of 4/13/22: 79.4 kg (175 lb).   Weight management plan: Discussed healthy diet and exercise guidelines    Return in about 3 months (around 7/25/2022) for mental health check.     Patient Instructions   This next week try the generic bupropion  mg  If this causes bad dreams, we can   1) retrial the brand Wellbutrin  mg  Or  2) continue the brand Wellbutrin  mg knowing we are discontinuing in a couple months  Or  3) discontinue all together after on week trial    In any case, please update me with a [employer] message so that I can refill appropriately    Have psychiatry DA for ADHD sent to me [phone]  attn: Brandi Brice, ISAIAS WRIGHT  [facility]    Gulshan Min is a 31 year old who presents for the following health issues     HPI     Answers for HPI/ROS submitted by the patient on 4/19/2022  LUCIO 7 TOTAL SCORE: 1  Depression/Anxiety: Anxiety  Anxiety since last: : better  Other associated symotome: : No  Significant life event: : relationship concerns, job concerns, financial concerns, health concerns, other  Anxious:: No  Current substance use:: No  How many servings of fruits and vegetables do you eat daily?: 2-3  On average, how many sweetened beverages do you drink each day (Examples: soda, juice, sweet tea, etc.  Do NOT count diet or artificially sweetened beverages)?: 0  How many minutes a day do you exercise enough to make your heart beat faster?: 10 to 19  How many days a week do you exercise enough to make your heart beat " faster?: 5  How many days per week do you miss taking your medication?: 0  Postpartum approximately 2 months -  21 with prodromal, then augmented labor and sunnyside up presentation - girl  Had internal and external lacerations; restarted pelvic floor physical therapy approximately 6 week PP  RTW 21 - will be part time for two weeks and then full time.  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  Hard recovery postpartum, but doing better.  Mild UTI at 1 week postpartum    Mental health - physical anxiety in pregnancy at Wellbutrin (brand) 150 XR stopped when bupropion dose decreased and switched to SR.  Hasn't had any anxiety, but also is not working. Depression symptoms are entirely in remission for a long time.  The Bupropion  mg daily is expensive.  The generic caused nightmares and is concerned about this with generic SR version.  Has been on bupropion for about 10 years.      ADHD - has been diagnosed with ADHD and prescribed medications in the past.  Previously took Concerta and 27 mg dose was the best.  New job will be therapy only which will require a different level of attention versus current job that has a variety of roles.  Stopped around 24 years old because didn't want to take it any longer.  Initially had appetite and lost weight, as well as heart racing with Concerta 36 mg - no notable benefits over 27 mg and more side effect.  Did have EKG with symptoms which was normal.  Adderall caused zombie-like effects.  Neither medication caused anxiety.    Thyroid - levothyroxine refill will come through mail order pharmacy. No symptoms identified of hypo or hyper thyroid.  The dose has never been changed from 50 mcg.    UTI prevention - standing amoxicillin for after intercourse    Asthma  - well controlled, did use albuterol with a cold recently but otherwise asymptomatic    Current Outpatient Medications   Medication     albuterol  (PROVENTIL HFA) 108 (90 Base) MCG/ACT inhaler     amoxicillin (AMOXIL) 250 MG capsule     buPROPion (WELLBUTRIN SR) 100 MG 12 hr tablet     clindamycin (CLINDAMAX) 1 % external gel     conjugated estrogens (PREMARIN) 0.625 MG/GM vaginal cream     docusate sodium (COLACE) 50 MG capsule     doxylamine (UNISOM) 25 MG TABS tablet     estradiol (ESTRACE) 0.1 MG/GM vaginal cream     levothyroxine (SYNTHROID/LEVOTHROID) 50 MCG tablet     norethindrone (MICRONOR) 0.35 MG tablet     Prenatal Vit-Fe Fumarate-FA (PRENATAL PO)     WELLBUTRIN  MG 12 hr tablet     No current facility-administered medications for this visit.       Review of Systems   Constitutional, HEENT, cardiovascular, pulmonary, gi and gu systems are negative, except as otherwise noted.      Objective           Vitals:  No vitals were obtained today due to virtual visit.    Physical Exam   GENERAL: Healthy, alert and no distress  EYES: Eyes grossly normal to inspection.  No discharge or erythema, or obvious scleral/conjunctival abnormalities.  RESP: No audible wheeze, cough, or visible cyanosis.  No visible retractions or increased work of breathing.    SKIN: Visible skin clear. No significant rash, abnormal pigmentation or lesions.  NEURO: Cranial nerves grossly intact.  Mentation and speech appropriate for age.  PSYCH: Mentation appears normal, affect normal/bright, judgement and insight intact, normal speech and appearance well-groomed.          Video-Visit Details    Type of service:  Video Visit    Video End Time:11:12 AM    Originating Location (pt. Location): Home    Distant Location (provider location):  [facility]     Platform used for Video Visit: [employer]

## 2022-04-25 NOTE — PATIENT INSTRUCTIONS
This next week try the generic bupropion  mg  If this causes bad dreams, we can   1) retrial the brand Wellbutrin  mg  Or  2) continue the brand Wellbutrin  mg knowing we are discontinuing in a couple months  Or  3) discontinue all together after on week trial    In any case, please update me with a [employer] message so that I can refill appropriately    Have psychiatry DA for ADHD sent to me [phone]  attn: Brandi

## 2022-04-26 ENCOUNTER — MYC MEDICAL ADVICE (OUTPATIENT)
Dept: FAMILY MEDICINE | Facility: CLINIC | Age: 32
End: 2022-04-26
Payer: COMMERCIAL

## 2022-04-28 ENCOUNTER — MEDICAL CORRESPONDENCE (OUTPATIENT)
Dept: HEALTH INFORMATION MANAGEMENT | Facility: CLINIC | Age: 32
End: 2022-04-28
Payer: COMMERCIAL

## 2022-05-01 ENCOUNTER — MYC MEDICAL ADVICE (OUTPATIENT)
Dept: FAMILY MEDICINE | Facility: CLINIC | Age: 32
End: 2022-05-01
Payer: COMMERCIAL

## 2022-05-01 DIAGNOSIS — F41.9 ANXIETY: ICD-10-CM

## 2022-05-01 DIAGNOSIS — F32.5 MAJOR DEPRESSIVE DISORDER, SINGLE EPISODE, IN REMISSION (H): Primary | ICD-10-CM

## 2022-05-02 RX ORDER — BUPROPION HYDROCHLORIDE 100 MG/1
150 TABLET ORAL DAILY
Qty: 45 TABLET | Refills: 0 | Status: SHIPPED | OUTPATIENT
Start: 2022-05-02 | End: 2022-05-21

## 2022-05-03 ENCOUNTER — VIRTUAL VISIT (OUTPATIENT)
Dept: OBGYN | Facility: CLINIC | Age: 32
End: 2022-05-03
Payer: COMMERCIAL

## 2022-05-03 ENCOUNTER — NURSE TRIAGE (OUTPATIENT)
Dept: OBGYN | Facility: CLINIC | Age: 32
End: 2022-05-03
Payer: COMMERCIAL

## 2022-05-03 DIAGNOSIS — N61.0 MASTITIS: Primary | ICD-10-CM

## 2022-05-03 PROCEDURE — 99213 OFFICE O/P EST LOW 20 MIN: CPT | Mod: GT | Performed by: OBSTETRICS & GYNECOLOGY

## 2022-05-03 RX ORDER — DICLOXACILLIN SODIUM 500 MG
500 CAPSULE ORAL 4 TIMES DAILY
Qty: 56 CAPSULE | Refills: 0 | Status: SHIPPED | OUTPATIENT
Start: 2022-05-03 | End: 2022-05-13

## 2022-05-03 RX ORDER — DICLOXACILLIN SODIUM 500 MG
500 CAPSULE ORAL 4 TIMES DAILY
Status: CANCELLED
Start: 2022-05-03

## 2022-05-03 NOTE — PROGRESS NOTES
Mechelle is a 31 year old who is being evaluated via a billable video visit.      How would you like to obtain your AVS? MyChart  If the video visit is dropped, the invitation should be resent by: Text to cell phone: [phone]  Will anyone else be joining your video visit? No  Video Start Time: 1513    Assessment & Plan     1. Mastitis  Reviewed that her symptoms are consistent with mastitis.  Recommend the below mentioned antibiotic. If symptoms don't improve in the next 2 days, encouraged Mechelle to reach out.  At that point, would want her to be seen in the office for exam.  She expressed understanding.  - dicloxacillin (DYNAPEN) 500 MG capsule; Take 1 capsule (500 mg) by mouth 4 times daily for 10 days  Dispense: 56 capsule; Refill: 0      Zuri Dumont MD  [facility]    Subjective   Mechelle is a 31 year old who presents for the following health issues:  Concern for mastitis    HPI   Mechelle reached out today with concern for mastitis.    Pumped overnight, but doesn't think she had a good latch and didn't get as much milk off.  Noticed clogged ducts this morning.  Has tried to release them herself, but started feeling poorly later in the day.  Had chills around 1200.  Then developed the shakes, HA and nausea around 1315.  Took ibuprofen and initially felt better, but then later had temperature 101 when ibuprofen wore off.    Has continued trying to pump and feed, as well as massage, warm compress, etc.      Review of Systems   Constitutional, HEENT, cardiovascular, pulmonary, gi and gu systems are negative, except as otherwise noted.      Objective      Vitals:  No vitals were obtained today due to virtual visit.    Physical Exam   GENERAL: Healthy, alert and no distress  EYES: Eyes grossly normal to inspection.  No discharge or erythema, or obvious scleral/conjunctival abnormalities.  RESP: No audible wheeze, cough, or visible cyanosis.  No visible retractions or increased work of breathing.     SKIN: Visible skin clear. No significant rash, abnormal pigmentation or lesions.  NEURO: Cranial nerves grossly intact.  Mentation and speech appropriate for age.  PSYCH: Mentation appears normal, affect normal/bright, judgement and insight intact, normal speech and appearance well-groomed.          Video-Visit Details    Type of service:  Video Visit    Video End Time:3:26 PM    Originating Location (pt. Location): Home    Distant Location (provider location):  [facility]     Platform used for Video Visit: [employer]

## 2022-05-03 NOTE — TELEPHONE ENCOUNTER
Provider Response to 2nd Level Triage Request    I have reviewed the RN documentation. My recommendation is:  SK has opening at 3 - please schedule video or in person viist

## 2022-05-03 NOTE — TELEPHONE ENCOUNTER
"Verbal consult with in clinic providers.   Will route TE to on call provider for pended treatment as suggested.   Roxanne HEREDIA RN      Additional Information    Negative: Breastfeeding questions about baby    Negative: Breastfeeding questions about mother (breast symptoms or feeling sick)    Negative: Breastfeeding questions about mother's medicines and diet    Negative: Breastfeeding questions about weaning    Negative: Postpartum breast pain and swelling and NOT breastfeeding    Answer Assessment - Initial Assessment Questions  1. BREASTFEEDING: \"Are you currently breastfeeding?\" If No: \"When did you stop?\"      Yes   2. DELIVERY: \"When was the baby born?\"      2 months old  3. MAIN CONCERN: \"What is your main concern today?\" (e.g., breast symptoms, medication question)      Possible mastitis  4. BREAST PAIN: \"Are you having breast pain?\" If Yes, \"How bad is the pain?\" (Scale 1-10; or mild, moderate, severe). If Yes, \"Which breast?\" (e.g., left, right, both)  Intense in Left breast  5. REDNESS: \"Does the skin on the breast appear red?\"      Unknown, patient has been massaging  6. FEVER: \"Do you have a fever?\" If so, ask: \"What is it, how was it measured, and when did it start?\"      Has chills  7. OTHER SYMPTOMS: \"Do you have any other symptoms?\" (e.g., weakness, abdominal pain)      Feels ill, no just tired    Protocols used: POSTPARTUM - BREASTFEEDING GUIDELINE [id number]      "

## 2022-05-04 DIAGNOSIS — E03.9 HYPOTHYROIDISM, UNSPECIFIED TYPE: ICD-10-CM

## 2022-05-05 RX ORDER — LEVOTHYROXINE SODIUM 50 UG/1
50 TABLET ORAL DAILY
Qty: 90 TABLET | Refills: 3 | Status: SHIPPED | OUTPATIENT
Start: 2022-05-05 | End: 2023-05-03

## 2022-05-05 NOTE — TELEPHONE ENCOUNTER
"Requested Prescriptions   Pending Prescriptions Disp Refills     levothyroxine (SYNTHROID/LEVOTHROID) 50 MCG tablet 90 tablet 3     Sig: Take 1 tablet (50 mcg) by mouth daily       Thyroid Protocol Failed - 5/4/2022  2:49 PM        Failed - No positive pregnancy test in past 12 months     If patient is pregnant or has had a positive pregnancy test, please check TSH.          Passed - Patient is 12 years or older        Passed - Recent (12 mo) or future (30 days) visit within the authorizing provider's specialty     Patient has had an office visit with the authorizing provider or a provider within the authorizing providers department within the previous 12 mos or has a future within next 30 days. See \"Patient Info\" tab in inbasket, or \"Choose Columns\" in Meds & Orders section of the refill encounter.              Passed - Medication is active on med list        Passed - Normal TSH on file in past 12 months     Recent Labs   Lab Test 02/10/22  1633   TSH 2.64              Passed - No active pregnancy on record     If patient is pregnant or has had a positive pregnancy test, please check TSH.             Thanks,  CELE Weaver  [facility]       "

## 2022-05-19 ENCOUNTER — THERAPY VISIT (OUTPATIENT)
Dept: PHYSICAL THERAPY | Facility: CLINIC | Age: 32
End: 2022-05-19
Payer: COMMERCIAL

## 2022-05-19 DIAGNOSIS — M62.89 PELVIC FLOOR DYSFUNCTION: Primary | ICD-10-CM

## 2022-05-19 PROCEDURE — 97535 SELF CARE MNGMENT TRAINING: CPT | Mod: GP | Performed by: PHYSICAL THERAPIST

## 2022-05-19 PROCEDURE — 97110 THERAPEUTIC EXERCISES: CPT | Mod: GP | Performed by: PHYSICAL THERAPIST

## 2022-05-19 PROCEDURE — 97140 MANUAL THERAPY 1/> REGIONS: CPT | Mod: GP | Performed by: PHYSICAL THERAPIST

## 2022-05-21 ENCOUNTER — MYC REFILL (OUTPATIENT)
Dept: FAMILY MEDICINE | Facility: CLINIC | Age: 32
End: 2022-05-21
Payer: COMMERCIAL

## 2022-05-21 DIAGNOSIS — F41.9 ANXIETY: ICD-10-CM

## 2022-05-21 DIAGNOSIS — F32.5 MAJOR DEPRESSIVE DISORDER, SINGLE EPISODE, IN REMISSION (H): ICD-10-CM

## 2022-05-23 RX ORDER — BUPROPION HYDROCHLORIDE 100 MG/1
150 TABLET ORAL DAILY
Qty: 135 TABLET | Refills: 0 | Status: SHIPPED | OUTPATIENT
Start: 2022-05-23 | End: 2022-07-21

## 2022-05-23 NOTE — TELEPHONE ENCOUNTER
"Requested Prescriptions   Pending Prescriptions Disp Refills     buPROPion (WELLBUTRIN) 100 MG tablet 45 tablet 0     Sig: Take 1.5 tablets (150 mg) by mouth daily       SSRIs Protocol Failed - 5/21/2022  3:31 PM        Failed - No positive pregnancy test in last 12 months        Passed - PHQ-9 score less than 5 in past 6 months     Please review last PHQ-9 score.           Passed - Medication is Bupropion     If the medication is Bupropion (Wellbutrin), and the patient is taking for smoking cessation; OK to refill.          Passed - Medication is active on med list        Passed - Patient is age 18 or older        Passed - No active pregnancy on record        Passed - Recent (6 mo) or future (30 days) visit within the authorizing provider's specialty     Patient had office visit in the last 6 months or has a visit in the next 30 days with authorizing provider or within the authorizing provider's specialty.  See \"Patient Info\" tab in inbasket, or \"Choose Columns\" in Meds & Orders section of the refill encounter.               T'd up 90 day supply.  Kristen MAX RN    "

## 2022-05-27 ENCOUNTER — THERAPY VISIT (OUTPATIENT)
Dept: PHYSICAL THERAPY | Facility: CLINIC | Age: 32
End: 2022-05-27
Payer: COMMERCIAL

## 2022-05-27 DIAGNOSIS — M62.89 PELVIC FLOOR DYSFUNCTION: Primary | ICD-10-CM

## 2022-05-27 PROCEDURE — 97140 MANUAL THERAPY 1/> REGIONS: CPT | Mod: GP | Performed by: PHYSICAL THERAPIST

## 2022-05-27 PROCEDURE — 97110 THERAPEUTIC EXERCISES: CPT | Mod: GP | Performed by: PHYSICAL THERAPIST

## 2022-06-05 ENCOUNTER — MYC MEDICAL ADVICE (OUTPATIENT)
Dept: FAMILY MEDICINE | Facility: CLINIC | Age: 32
End: 2022-06-05
Payer: COMMERCIAL

## 2022-06-05 DIAGNOSIS — Z87.440 HISTORY OF UTI: ICD-10-CM

## 2022-06-06 RX ORDER — AMOXICILLIN 250 MG/1
250 CAPSULE ORAL
Qty: 30 CAPSULE | Refills: 3 | Status: SHIPPED | OUTPATIENT
Start: 2022-06-06 | End: 2023-08-03

## 2022-06-06 NOTE — TELEPHONE ENCOUNTER
Brandi,     Please see pt jayden msg re: refill request    medication cued for refill.     Angela Ramon RN  [facility]

## 2022-06-10 ENCOUNTER — THERAPY VISIT (OUTPATIENT)
Dept: PHYSICAL THERAPY | Facility: CLINIC | Age: 32
End: 2022-06-10
Payer: COMMERCIAL

## 2022-06-10 DIAGNOSIS — M62.89 PELVIC FLOOR DYSFUNCTION: Primary | ICD-10-CM

## 2022-06-10 PROCEDURE — 97110 THERAPEUTIC EXERCISES: CPT | Mod: GP | Performed by: PHYSICAL THERAPIST

## 2022-06-10 PROCEDURE — 97530 THERAPEUTIC ACTIVITIES: CPT | Mod: GP | Performed by: PHYSICAL THERAPIST

## 2022-07-21 ENCOUNTER — VIRTUAL VISIT (OUTPATIENT)
Dept: FAMILY MEDICINE | Facility: CLINIC | Age: 32
End: 2022-07-21
Payer: COMMERCIAL

## 2022-07-21 DIAGNOSIS — F32.5 MAJOR DEPRESSIVE DISORDER, SINGLE EPISODE, IN REMISSION (H): Primary | ICD-10-CM

## 2022-07-21 DIAGNOSIS — F41.9 ANXIETY: ICD-10-CM

## 2022-07-21 DIAGNOSIS — D18.01 CHERRY ANGIOMA: ICD-10-CM

## 2022-07-21 DIAGNOSIS — F98.8 ATTENTION DEFICIT DISORDER (ADD) WITHOUT HYPERACTIVITY: ICD-10-CM

## 2022-07-21 PROCEDURE — 99215 OFFICE O/P EST HI 40 MIN: CPT | Mod: 95 | Performed by: NURSE PRACTITIONER

## 2022-07-21 RX ORDER — METHYLPHENIDATE HYDROCHLORIDE 18 MG/1
18 TABLET ORAL EVERY MORNING
Qty: 90 TABLET | Refills: 0 | Status: SHIPPED | OUTPATIENT
Start: 2022-07-21 | End: 2022-09-05

## 2022-07-21 RX ORDER — BUPROPION HYDROCHLORIDE 100 MG/1
150 TABLET ORAL DAILY
Qty: 135 TABLET | Refills: 1 | Status: SHIPPED | OUTPATIENT
Start: 2022-07-21 | End: 2022-10-06

## 2022-07-21 RX ORDER — ESCITALOPRAM OXALATE 5 MG/1
5 TABLET ORAL DAILY
Qty: 90 TABLET | Refills: 0 | Status: SHIPPED | OUTPATIENT
Start: 2022-07-21 | End: 2022-10-06

## 2022-07-21 NOTE — PROGRESS NOTES
Mechelle is a 32 year old who is being evaluated via a billable video visit.      How would you like to obtain your AVS? MyChart  If the video visit is dropped, the invitation should be resent by: Send to e-mail at: [email or web address] [phone]   Will anyone else be joining your video visit? No    Assessment & Plan     Major depressive disorder, single episode, in remission (H)  Historically escitalopram has not helped as much with depression, but currently anxiety is high and unmanageable. Complicated by untreated ADHD and temporary increased stressors. Rather than change MDD/LUCIO medication now, I recommend starting methylphenidate and observing effect. I think a good option would be adding escitalopram, but appreciate patient's wish to minimize number of medications. If finding that time and methyphenidate haven't addressed anxiety, provided cross taper plan for bupropion to escitalopram.  - buPROPion (WELLBUTRIN) 100 MG tablet; Take 1.5 tablets (150 mg) by mouth daily  - escitalopram (LEXAPRO) 5 MG tablet; Take 1 tablet (5 mg) by mouth daily    Anxiety  See above  - buPROPion (WELLBUTRIN) 100 MG tablet; Take 1.5 tablets (150 mg) by mouth daily  - escitalopram (LEXAPRO) 5 MG tablet; Take 1 tablet (5 mg) by mouth daily    Attention deficit disorder (ADD) without hyperactivity  Starting with lower dose than previously taking  - methylphenidate HCl ER (CONCERTA) 18 MG CR tablet; Take 1 tablet (18 mg) by mouth every morning    Cherry angioma  - Adult Dermatology Referral; Future    Prescription drug management  45 minutes spent on the date of the encounter doing chart review, history and exam, documentation and further activities per the note       Patient Instructions   Medication plan -   Continue bupropion 100 mg daily  Add methylphenidate 18 mg daily    My hope is that the anxiety reduces with both the addition of the ADHD management and with life calming down in a month. And then we don't need to switch to  escitalopram when bupropion has been doing a good job for depression and escitalopram didn't historically.    If this isn't working for you, and I would expect to have some sense of this within 2-4 weeks, then I did write a prescription for escitalopram. My recommendation is adding the escitalopram, but I know you do not want to take three medications. And you do want to taper down on the bupropion instead of a hard discontinue. My cross taper plan would be  Week one - escitalopram 5 mg and bupropion 100 mg  Week two - escitalopram 5 mg and bupropion 50 mg  Week three - escitalopram 5 mg and bupropion 50 mg  Week four - escitalopram 5 mg (or could increase to 10 mg) and discontinue buproprion      Return in about 3 months (around 10/21/2022) for ADHD, mental health check.    ISAIAS Larios CNP  [facility]    Gulshan Min is a 32 year old accompanied by her self, presenting for the following health issues:  Recheck Medication      HPI   Answers for HPI/ROS submitted by the patient on 7/18/2022  What is the reason for your visit today? : ADHD med, dep/anx med follow up  How many servings of fruits and vegetables do you eat daily?: 2-3  On average, how many sweetened beverages do you drink each day (Examples: soda, juice, sweet tea, etc.  Do NOT count diet or artificially sweetened beverages)?: 0  How many minutes a day do you exercise enough to make your heart beat faster?: 10 to 19  How many days a week do you exercise enough to make your heart beat faster?: 5  How many days per week do you miss taking your medication?: 0    Mental health  Life stressful in all aspects - Celeste (four and a half months) went to the hospital twice for breath holding. Ultimately, diagnosed with reflux and with medication not breath holding.  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 Anticipating 2-3 months of flux and then should be stable. Mom is in the  "hospital in [place] and can't be released until symptoms controlled.    Anxiety has been higher and having trouble managing it at times. Wondering if should switch to escitalopram, which worked well, but was having breakthrough depression symptoms. Would like to restart methylphenidate. Not interested in being on on two new medications.    Does get withdraw effects from bupropion so would want to taper down.    Last took concerta at age 24 years. Took increasing doses up to 36 mg. Has ad side effects such as appetite suppression and weight loss. This resolved with lower dose. Also had heart racing at 36 mg. It lasted a \"work day\" - was in school and did have a 5 mg Ritalin for times when studying went later. Daughter's growth has been really good (BMI 99th percentile).     Skin - has noted 3 spots blood-colored flat lesion on neck in past couple months    Review of Systems     Objective           Vitals:  No vitals were obtained today due to virtual visit.    Physical Exam   GENERAL: Healthy, alert and no distress  EYES: Eyes grossly normal to inspection.  No discharge or erythema, or obvious scleral/conjunctival abnormalities.  RESP: No audible wheeze, cough, or visible cyanosis.  No visible retractions or increased work of breathing.    SKIN: Visible skin clear. No significant rash, abnormal pigmentation or lesions.  NEURO: Cranial nerves grossly intact.  Mentation and speech appropriate for age.  PSYCH: Mentation appears normal, affect normal/bright, judgement and insight intact, normal speech and appearance well-groomed.        Video-Visit Details    Start: 07/21/2022 07:28 am  Stop: 07/21/2022 08:04 am    Type of service:  Video Visit      Originating Location (pt. Location): Home    Distant Location (provider location):  [facility]     Platform used for Video Visit: [employer]    .  ..  "

## 2022-07-22 DIAGNOSIS — L70.9 ACNE, UNSPECIFIED ACNE TYPE: ICD-10-CM

## 2022-07-22 RX ORDER — CLINDAMYCIN PHOSPHATE 10 MG/G
GEL TOPICAL 2 TIMES DAILY
Qty: 60 G | Refills: 3 | Status: SHIPPED | OUTPATIENT
Start: 2022-07-22 | End: 2022-07-28

## 2022-07-22 NOTE — PATIENT INSTRUCTIONS
Medication plan -   Continue bupropion 100 mg daily  Add methylphenidate 18 mg daily    My hope is that the anxiety reduces with both the addition of the ADHD management and with life calming down in a month. And then we don't need to switch to escitalopram when bupropion has been doing a good job for depression and escitalopram didn't historically.    If this isn't working for you, and I would expect to have some sense of this within 2-4 weeks, then I did write a prescription for escitalopram. My recommendation is adding the escitalopram, but I know you do not want to take three medications. And you do want to taper down on the bupropion instead of a hard discontinue. My cross taper plan would be  Week one - escitalopram 5 mg and bupropion 100 mg  Week two - escitalopram 5 mg and bupropion 50 mg  Week three - escitalopram 5 mg and bupropion 50 mg  Week four - escitalopram 5 mg (or could increase to 10 mg) and discontinue buproprion

## 2022-09-05 ENCOUNTER — MYC REFILL (OUTPATIENT)
Dept: FAMILY MEDICINE | Facility: CLINIC | Age: 32
End: 2022-09-05

## 2022-09-05 DIAGNOSIS — F98.8 ATTENTION DEFICIT DISORDER (ADD) WITHOUT HYPERACTIVITY: ICD-10-CM

## 2022-09-06 RX ORDER — METHYLPHENIDATE HYDROCHLORIDE 18 MG/1
18 TABLET ORAL EVERY MORNING
Qty: 90 TABLET | Refills: 0 | Status: SHIPPED | OUTPATIENT
Start: 2022-09-06 | End: 2022-12-05

## 2022-09-06 NOTE — TELEPHONE ENCOUNTER
Brandi    See pt Mychart refill message regarding her concerta    Jazmyn Connor RN   [facility]

## 2022-09-18 ENCOUNTER — HEALTH MAINTENANCE LETTER (OUTPATIENT)
Age: 32
End: 2022-09-18

## 2022-09-20 ENCOUNTER — MYC MEDICAL ADVICE (OUTPATIENT)
Dept: FAMILY MEDICINE | Facility: CLINIC | Age: 32
End: 2022-09-20

## 2022-09-29 ENCOUNTER — MYC MEDICAL ADVICE (OUTPATIENT)
Dept: FAMILY MEDICINE | Facility: CLINIC | Age: 32
End: 2022-09-29

## 2022-09-30 ENCOUNTER — IMMUNIZATION (OUTPATIENT)
Dept: FAMILY MEDICINE | Facility: CLINIC | Age: 32
End: 2022-09-30
Payer: COMMERCIAL

## 2022-09-30 DIAGNOSIS — Z23 NEED FOR PROPHYLACTIC VACCINATION AND INOCULATION AGAINST INFLUENZA: Primary | ICD-10-CM

## 2022-09-30 PROCEDURE — 90471 IMMUNIZATION ADMIN: CPT

## 2022-09-30 PROCEDURE — 99207 PR NO CHARGE NURSE ONLY: CPT

## 2022-09-30 PROCEDURE — 90686 IIV4 VACC NO PRSV 0.5 ML IM: CPT

## 2022-10-03 ASSESSMENT — ANXIETY QUESTIONNAIRES
7. FEELING AFRAID AS IF SOMETHING AWFUL MIGHT HAPPEN: NOT AT ALL
5. BEING SO RESTLESS THAT IT IS HARD TO SIT STILL: NOT AT ALL
7. FEELING AFRAID AS IF SOMETHING AWFUL MIGHT HAPPEN: NOT AT ALL
2. NOT BEING ABLE TO STOP OR CONTROL WORRYING: SEVERAL DAYS
GAD7 TOTAL SCORE: 4
6. BECOMING EASILY ANNOYED OR IRRITABLE: NOT AT ALL
GAD7 TOTAL SCORE: 4
8. IF YOU CHECKED OFF ANY PROBLEMS, HOW DIFFICULT HAVE THESE MADE IT FOR YOU TO DO YOUR WORK, TAKE CARE OF THINGS AT HOME, OR GET ALONG WITH OTHER PEOPLE?: SOMEWHAT DIFFICULT
3. WORRYING TOO MUCH ABOUT DIFFERENT THINGS: SEVERAL DAYS
GAD7 TOTAL SCORE: 4
IF YOU CHECKED OFF ANY PROBLEMS ON THIS QUESTIONNAIRE, HOW DIFFICULT HAVE THESE PROBLEMS MADE IT FOR YOU TO DO YOUR WORK, TAKE CARE OF THINGS AT HOME, OR GET ALONG WITH OTHER PEOPLE: SOMEWHAT DIFFICULT
4. TROUBLE RELAXING: NOT AT ALL
1. FEELING NERVOUS, ANXIOUS, OR ON EDGE: MORE THAN HALF THE DAYS

## 2022-10-06 ENCOUNTER — VIRTUAL VISIT (OUTPATIENT)
Dept: FAMILY MEDICINE | Facility: CLINIC | Age: 32
End: 2022-10-06
Payer: COMMERCIAL

## 2022-10-06 DIAGNOSIS — E03.9 HYPOTHYROIDISM, UNSPECIFIED TYPE: ICD-10-CM

## 2022-10-06 DIAGNOSIS — F41.9 ANXIETY: Primary | ICD-10-CM

## 2022-10-06 DIAGNOSIS — F98.8 ATTENTION DEFICIT DISORDER (ADD) WITHOUT HYPERACTIVITY: ICD-10-CM

## 2022-10-06 DIAGNOSIS — F32.5 MAJOR DEPRESSIVE DISORDER, SINGLE EPISODE, IN REMISSION (H): ICD-10-CM

## 2022-10-06 PROCEDURE — 99215 OFFICE O/P EST HI 40 MIN: CPT | Mod: 95 | Performed by: NURSE PRACTITIONER

## 2022-10-06 RX ORDER — PROPRANOLOL HYDROCHLORIDE 20 MG/1
20 TABLET ORAL 3 TIMES DAILY PRN
Qty: 90 TABLET | Refills: 3 | Status: SHIPPED | OUTPATIENT
Start: 2022-10-06 | End: 2023-07-17

## 2022-10-06 ASSESSMENT — ASTHMA QUESTIONNAIRES: ACT_TOTALSCORE: 23

## 2022-10-06 NOTE — PROGRESS NOTES
Mechelle is a 32 year old who is being evaluated via a billable video visit.      How would you like to obtain your AVS? MyChart  If the video visit is dropped, the invitation should be resent by: Text to cell phone: [phone]  Will anyone else be joining your video visit? No        Assessment & Plan     Anxiety  Feeling that anxiety is not enough of a symptom right now to initiate another medication, particularly with experience of side effects with multiple medications. Interested in trial of propranolol PRN. With anxiety symptoms being physical, this may be a nice option. Discussed that it can be taken regularly, but if taken regularly it could cause low BP or low HR which may be uncomfortable or even potentially dangerous due to patient's low resting HR. At typical episodic dosing with 10 mg or 20 mg, I do not have concern for those side effects. Also discussed potential for asthma trigger.    We are also wondering if the bupropion is responsible for remaining anxiety symptoms. The cross taper for this with escitalopram was more to have no unmedicated period and not that I want the bupropion to continue so advise discontinuation at this point. Continues to have withdraw symptoms from escitalopram and/or bupropion, but wishes to ride it out at the moment. Has had success with one week of fluoxetine in the past to help with taper off duloxetine so consider this if withdraw symptoms worsen or continue.    If symptoms of anxiety OR depression return, I would advise a restart of lamotrigine with taper up from 25 mg to 100 mg with increases of 25 mg every 2 weeks. Patient had success with this for depression and no side effects. I am ok for a colleague to write for this is patient wishes to start and I am out of clinic. Would want 4 week follow-up if started.    - propranolol (INDERAL) 20 MG tablet; Take 1 tablet (20 mg) by mouth 3 times daily as needed (anxiety)    Major depressive disorder, single episode, in  remission (H)  No symptoms of depression.    Attention deficit disorder (ADD) without hyperactivity  Current dose is working well. Hasn't had anxiety from her ADHD medication historically. No changes here.    Hypothyroidism, unspecified type  Possibly indicated to test - notably with eyebrow hair loss and muscle aches. Discsused standing order made and patient can elect lab only draw if these symptoms are not improving as withdraw from medication clears.  - TSH with free T4 reflex; Standing    Prescription drug management  63 minutes spent on the date of the encounter doing chart review, history and exam, documentation and further activities per the note       Patient Instructions   Ok to stop buproprion entirely    Propranolol ordered for episodic anxiety. You can try 10 mg (1/2 tab) to start. It should take effect within 15 minutes.  Watch for asthma symptoms or symptoms of low heart rate or blood pressure - particularly dizziness with changing positions  If you do take daily, we need to consider taper down to avoid rebound high heart rate      Return in about 6 months (around 4/6/2023).    ISAIAS Larios [facility]    Gulshan Min is a 32 year old accompanied by her self, presenting for the following health issues:  No chief complaint on file.      HPI     Answers for HPI/ROS submitted by the patient on 10/3/2022  LUCIO 7 TOTAL SCORE: 4  Depression/Anxiety: Anxiety  Since last visit, patient describes the following THYROID symptoms:: Anxiety, Fatigue, Hair loss, Weight loss  Anxiety since last: : medium  Other associated symotome: : No  Significant life event: : job concerns, financial concerns, housing concerns  Anxious:: Yes  Current substance use:: No  Weight loss: : Less than 5 lbs.  How many servings of fruits and vegetables do you eat daily?: 2-3  On average, how many sweetened beverages do you drink each day (Examples: soda, juice, sweet tea, etc.  Do NOT count diet  or artificially sweetened beverages)?: 0  How many minutes a day do you exercise enough to make your heart beat faster?: 10 to 19  How many days a week do you exercise enough to make your heart beat faster?: 4  How many days per week do you miss taking your medication?: 0    Anxiety and Depression - today's PHQ-2 = 0 and LUCIO = 4    Started escitalopram 9/12 via cross taper with bupropion. Noticed side effects at the lowest dose - fatigued, as well as agitated, apathy toward exercise and hobbies. Some improvement in immediate symptoms with moving administration to nighttime. Since stopping, has been able to exercise and do activities. But is having some withdraw - exhaustion, mild agitation, irritation, skin prickly, some strange dreams and insomnia.    Has not had depression symptoms. Is having more emotions. Anxiety does not feel too bothersome.    Previous med trials  Prozac (fluoxetine) YES- for one week as withdraw aid during discontinuing duloxetine  Zoloft (sertraline) no  Celexa (citalopram) YES- not full response  Lexapro (escitalopram) YES- not full response; retrial 2022 - cause side effects of fatigue, physical agitation at 5 mg  Paxil (paroxetine) no  Wellbutrin (bupropion) YES- currently 50 mg , monotherapy from [id number]; Wellbutrin XL Brand 150 mg caused physical anxiety that stopped when the medication was switched to generic bupropion  mg  Effexor (venlafaxine) YES- not full response  Others:  no  Cymbalta: YES - flu-like symptoms  Lamictal - 100 and 150 mg - 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 Does not recall side effects.     ADHD - was able to get 90 day supply through [facility] mail order    Thyroid - has been wondering if fatigue could be due to thyroid; also having hair loss, including eyebrows, muscle aches from exercise lasting longer  Component       Latest Ref Rng & Units 3/5/2021 6/28/2021 8/6/2021 11/17/2021   TSH      0.40 - 4.00 mU/L 2.17 2.03 1.90 1.98     Component      Latest Ref Rng & Units 2/10/2022   TSH      0.40 - 4.00 mU/L 2.64     Fingernails - brown horizontal lines at tip between pink part and edge. When it grows out, she can scrape it away. Not permanent dark lines. Does see derm in December          Objective         Vitals:  No vitals were obtained today due to virtual visit.    Physical Exam   GENERAL: Healthy, alert and no distress  EYES: Eyes grossly normal to inspection.  No discharge or erythema, or obvious scleral/conjunctival abnormalities.  RESP: No audible wheeze, cough, or visible cyanosis.  No visible retractions or increased work of breathing.    SKIN: Visible skin clear. No significant rash, abnormal pigmentation or lesions.  NEURO: Cranial nerves grossly intact.  Mentation and speech appropriate for age.  PSYCH: Mentation appears normal, affect normal/bright, judgement and insight intact, normal speech and appearance well-groomed.            Video-Visit Details    Video Start Time: 7:05 AM    Type of service:  Video Visit    Video End Time:7:43 AM    Originating Location (pt. Location): Home    Distant Location (provider location):  [facility]     Platform used for Video Visit: [employer]

## 2022-10-06 NOTE — PATIENT INSTRUCTIONS
Ok to stop buproprion entirely    Propranolol ordered for episodic anxiety. You can try 10 mg (1/2 tab) to start. It should take effect within 15 minutes.  Watch for asthma symptoms or symptoms of low heart rate or blood pressure - particularly dizziness with changing positions  If you do take daily, we need to consider taper down to avoid rebound high heart rate

## 2022-10-16 ENCOUNTER — MYC MEDICAL ADVICE (OUTPATIENT)
Dept: FAMILY MEDICINE | Facility: CLINIC | Age: 32
End: 2022-10-16

## 2022-10-17 ENCOUNTER — LAB (OUTPATIENT)
Dept: LAB | Facility: CLINIC | Age: 32
End: 2022-10-17
Payer: COMMERCIAL

## 2022-10-17 DIAGNOSIS — E03.9 HYPOTHYROIDISM, UNSPECIFIED TYPE: ICD-10-CM

## 2022-10-17 DIAGNOSIS — Z34.83 ENCOUNTER FOR SUPERVISION OF OTHER NORMAL PREGNANCY IN THIRD TRIMESTER: ICD-10-CM

## 2022-10-17 LAB — HGB BLD-MCNC: 13.6 G/DL (ref 11.7–15.7)

## 2022-10-17 PROCEDURE — 36415 COLL VENOUS BLD VENIPUNCTURE: CPT

## 2022-10-17 PROCEDURE — 84443 ASSAY THYROID STIM HORMONE: CPT

## 2022-10-18 LAB — TSH SERPL DL<=0.005 MIU/L-ACNC: 1.75 MU/L (ref 0.4–4)

## 2022-10-21 NOTE — RESULT ENCOUNTER NOTE
Mechelle,    Your labs were all normal.  If you have any questions, please feel free to contact the clinic.    KARLY Child

## 2022-10-23 ENCOUNTER — MYC MEDICAL ADVICE (OUTPATIENT)
Dept: FAMILY MEDICINE | Facility: CLINIC | Age: 32
End: 2022-10-23

## 2022-10-23 DIAGNOSIS — F41.9 ANXIETY: Primary | ICD-10-CM

## 2022-10-31 RX ORDER — PROPRANOLOL HYDROCHLORIDE 20 MG/1
20 TABLET ORAL 3 TIMES DAILY
Qty: 60 TABLET | Refills: 1 | Status: SHIPPED | OUTPATIENT
Start: 2022-10-31 | End: 2023-06-06

## 2022-10-31 RX ORDER — HYDROXYZINE HYDROCHLORIDE 25 MG/1
12.5-25 TABLET, FILM COATED ORAL 3 TIMES DAILY PRN
Qty: 60 TABLET | Refills: 1 | Status: SHIPPED | OUTPATIENT
Start: 2022-10-31 | End: 2023-07-17

## 2022-11-15 ENCOUNTER — MYC MEDICAL ADVICE (OUTPATIENT)
Dept: FAMILY MEDICINE | Facility: CLINIC | Age: 32
End: 2022-11-15

## 2022-12-05 ENCOUNTER — MYC REFILL (OUTPATIENT)
Dept: FAMILY MEDICINE | Facility: CLINIC | Age: 32
End: 2022-12-05

## 2022-12-05 DIAGNOSIS — F98.8 ATTENTION DEFICIT DISORDER (ADD) WITHOUT HYPERACTIVITY: ICD-10-CM

## 2022-12-06 RX ORDER — METHYLPHENIDATE HYDROCHLORIDE 18 MG/1
18 TABLET ORAL EVERY MORNING
Qty: 90 TABLET | Refills: 0 | Status: SHIPPED | OUTPATIENT
Start: 2022-12-06 | End: 2023-03-01

## 2022-12-06 NOTE — TELEPHONE ENCOUNTER
Requested Prescriptions   Pending Prescriptions Disp Refills     methylphenidate HCl ER (CONCERTA) 18 MG CR tablet 90 tablet 0     Sig: Take 1 tablet (18 mg) by mouth every morning       There is no refill protocol information for this order        Routing refill request to provider for review/approval because:  Drug not on the [facility] [place] protocol       Roxanne Humphries RN  [facility]

## 2022-12-29 ENCOUNTER — OFFICE VISIT (OUTPATIENT)
Dept: DERMATOLOGY | Facility: CLINIC | Age: 32
End: 2022-12-29
Payer: COMMERCIAL

## 2022-12-29 ENCOUNTER — MYC MEDICAL ADVICE (OUTPATIENT)
Dept: DERMATOLOGY | Facility: CLINIC | Age: 32
End: 2022-12-29

## 2022-12-29 DIAGNOSIS — L85.8 KP (KERATOSIS PILARIS): ICD-10-CM

## 2022-12-29 DIAGNOSIS — L70.0 ACNE VULGARIS: ICD-10-CM

## 2022-12-29 DIAGNOSIS — D23.9 DERMAL NEVUS: ICD-10-CM

## 2022-12-29 DIAGNOSIS — L98.8 RHYTIDES: ICD-10-CM

## 2022-12-29 DIAGNOSIS — D22.9 MULTIPLE BENIGN NEVI: Primary | ICD-10-CM

## 2022-12-29 DIAGNOSIS — D18.01 CHERRY ANGIOMA: ICD-10-CM

## 2022-12-29 PROCEDURE — 99204 OFFICE O/P NEW MOD 45 MIN: CPT | Performed by: DERMATOLOGY

## 2022-12-29 RX ORDER — TRETINOIN 0.25 MG/G
CREAM TOPICAL
Qty: 45 G | Refills: 11 | Status: SHIPPED | OUTPATIENT
Start: 2022-12-29 | End: 2023-11-22

## 2022-12-29 ASSESSMENT — PAIN SCALES - GENERAL: PAINLEVEL: NO PAIN (0)

## 2022-12-29 NOTE — PROGRESS NOTES
[facility] Dermatology Note  Encounter Date: Dec 29, 2022  Office Visit     Dermatology Problem List:  1. Acne vulgaris/ Rhytides  - tretinoin 0.025% cream  2. Keratosis pilaris  - Amlactin lotion  ____________________________________________    Assessment & Plan:    # Dermatitis, anterior neck. Suspect irritant dermatitis versus less likely contact dermatitis. Not present on exam today. Offered a topical steroid to use as needed, but patient deferred at this time.  - Future: Consider referral for allergy testing    # Splinter hemorrhages. Not present on exam today.  - Reassured patient of benign nature  - Recommend regular emollient    # Rhytides. Chronic, flare.   # Acne vulgaris. Chronic, flare.  - Start tretinoin 0.025% cream to face. Instructed to apply topical acne medication once every other day and increase to nightly as tolerate.  Waiting 20-30 minutes after washing affected area(s) will decrease irritation. Method of application, side effects and expected results were discussed. The patient will apply pea size amount to the entire face, avoid areas around the eyes, corners of nose and mouth. Discussed side effects including photosensitivity and irritation.   - Continue OTC clindamycin     # Keratosis pilaris, chest and upper arms. Chronic, flare.   - Recommend Amlactin lotion   - Okay to use tretinoin on the area.    # Dermal nevus, right cheek  - Photodocumentation today  - Future: Patient will reach out if she would like a referral for excision     # Benign lesions: Multiple benign nevi, solar lentigos, Explained to patient benign nature of lesion. No treatment is necessary at this time unless the lesion changes or becomes symptomatic.   - ABCDs of melanoma were discussed and self skin checks were advised.  - Sun precaution was advised including the use of sun screens of SPF 30 or higher, sun protective clothing, and avoidance of tanning beds.    # Cherry angiomas. Discussed etiology  and treatment options.  - Reassured patient of benign nature  - Future: Patient will consider referral to Dr. Best for PDL    Procedures Performed:   None.     Follow-up: 2 year(s) in-person, or earlier for new or changing lesions    Staff and Scribe:     Scribe Disclosure:  I, MARVEL IBARRA, am serving as a scribe to document services personally performed by Pranay Peñaloza MD based on data collection and the provider's statements to me.     Provider Disclosure:   The documentation recorded by the scribe accurately reflects the services I personally performed and the decisions made by me.    Pranay Peñaloza MD    Department of Dermatology  [facility][note 3]: Phone: [phone], Fax:[phone]  [facility]: Phone: [phone] Fax: [phone]  ____________________________________________    CC: Skin Check (Areas of concern include red spots on forehead, neck, chest, and a couple other spots as well. Pt also reports that she has noticed brown lines underneath fingernails and toenails, but not actively. Pt suspicious of keratosis pilaris due to daughter being diagnosed and bumps on chest.)    HPI:  Ms. Mechelle Mata is a(n) 32 year old female who presents today as a new patient for FBSE.    Today, the patient reports red spots on her forehead, neck and chest that she has noticed since having her second child. She also has noticed some brown lines underneath her fingernails and toenails, but they are present on exam today.     She also notes that red flaky itchy bumps will appear on her neck that she has treated with eczema creams. She noticed it after applying some Vitamin C serum. Additionally, her daughter was recently diagnosed with KP and she believes this is related to the bumps on the patient's chest.    The patient otherwise denies any new or concerning lesions. No bleeding, painful,  pruritic, or changing lesions. They report no personal history of skin cancer. There is a family history of skin cancer (grandfather- melanoma). No history of immunosuppression. A history of indoor tanning (>50 times in college). They use sunscreen and protective clothing when outdoors for sun protection. Health otherwise stable. No other skin concerns.     Patient is otherwise feeling well, without additional skin concerns.    Labs Reviewed:  N/A    Physical Exam:  Vitals: There were no vitals taken for this visit.  SKIN: Total skin, which includes the head/face, both arms, chest, back, abdomen,both legs, buttocks, digits and/or nails, was examined. Patient provided photos of nails also examined.   - No rash present on the neck today.  - There are dome shaped bright red papules on the face, trunk and extremities.   - Scattered perifollicular papules to bilateral posterior arms and chest.  - Right cheek, 3 mm skin colored papule.  - Multiple regular brown pigmented macules and papules are identified on the trunk and extremities.   - Scattered brown macules on sun exposed areas.  - No other lesions of concern on areas examined.     Medications:  Current Outpatient Medications   Medication     albuterol (PROVENTIL HFA) 108 (90 Base) MCG/ACT inhaler     amoxicillin (AMOXIL) 250 MG capsule     clindamycin (CLINDAMAX) 1 % external gel     levothyroxine (SYNTHROID/LEVOTHROID) 50 MCG tablet     methylphenidate HCl ER (CONCERTA) 18 MG CR tablet     Prenatal Vit-Fe Fumarate-FA (PRENATAL PO)     propranolol (INDERAL) 20 MG tablet     conjugated estrogens (PREMARIN) 0.625 MG/GM vaginal cream     estradiol (ESTRACE) 0.1 MG/GM vaginal cream     hydrOXYzine (ATARAX) 25 MG tablet     norethindrone (MICRONOR) 0.35 MG tablet     propranolol (INDERAL) 20 MG tablet     No current facility-administered medications for this visit.      Past Medical History:   Patient Active Problem List   Diagnosis     Gestational hypertension      Pelvic floor dysfunction     Allergic rhinitis     Anxiety     Attention deficit disorder     Hypothyroidism     Major depressive disorder, single episode, in remission (H)     Past Medical History:   Diagnosis Date     Depressive disorder      Thyroid disease      Uncomplicated asthma         CC Gauri Brice, APRN CNP  [address] [facility] [address]  [place] [address] on close of this encounter.

## 2022-12-29 NOTE — LETTER
12/29/2022       RE: Mechelle Mata  [address] [address]     Dear Colleague,    Thank you for referring your patient, Mechelle Mata, to the [facility] [place] at [facility]. Please see a copy of my visit note below.    [facility] Dermatology Note  Encounter Date: Dec 29, 2022  Office Visit     Dermatology Problem List:  1. Acne vulgaris/ Rhytides  - tretinoin 0.025% cream  2. Keratosis pilaris  - Amlactin lotion  ____________________________________________    Assessment & Plan:    # Dermatitis, anterior neck. Suspect irritant dermatitis versus less likely contact dermatitis. Not present on exam today. Offered a topical steroid to use as needed, but patient deferred at this time.  - Future: Consider referral for allergy testing    # Splinter hemorrhages. Not present on exam today.  - Reassured patient of benign nature  - Recommend regular emollient    # Rhytides. Chronic, flare.   # Acne vulgaris. Chronic, flare.  - Start tretinoin 0.025% cream to face. Instructed to apply topical acne medication once every other day and increase to nightly as tolerate.  Waiting 20-30 minutes after washing affected area(s) will decrease irritation. Method of application, side effects and expected results were discussed. The patient will apply pea size amount to the entire face, avoid areas around the eyes, corners of nose and mouth. Discussed side effects including photosensitivity and irritation.   - Continue OTC clindamycin     # Keratosis pilaris, chest and upper arms. Chronic, flare.   - Recommend Amlactin lotion   - Okay to use tretinoin on the area.    # Dermal nevus, right cheek  - Photodocumentation today  - Future: Patient will reach out if she would like a referral for excision     # Benign lesions: Multiple benign nevi, solar lentigos, Explained to patient benign nature of lesion. No treatment is necessary  at this time unless the lesion changes or becomes symptomatic.   - ABCDs of melanoma were discussed and self skin checks were advised.  - Sun precaution was advised including the use of sun screens of SPF 30 or higher, sun protective clothing, and avoidance of tanning beds.    # Cherry angiomas. Discussed etiology and treatment options.  - Reassured patient of benign nature  - Future: Patient will consider referral to Dr. Best for PDL    Procedures Performed:   None.     Follow-up: 2 year(s) in-person, or earlier for new or changing lesions    Staff and Scribe:     Scribe Disclosure:  I, MARVEL WEDES, am serving as a scribe to document services personally performed by Pranay Peñaloza MD based on data collection and the provider's statements to me.     Provider Disclosure:   The documentation recorded by the scribe accurately reflects the services I personally performed and the decisions made by me.    Pranay Peñaloza MD    Department of Dermatology  [facility]: Phone: [phone], Fax:[phone]  [facility]: Phone: [phone] Fax: [phone]  ____________________________________________    CC: Skin Check (Areas of concern include red spots on forehead, neck, chest, and a couple other spots as well. Pt also reports that she has noticed brown lines underneath fingernails and toenails, but not actively. Pt suspicious of keratosis pilaris due to daughter being diagnosed and bumps on chest.)    HPI:  Ms. Mechelle Mata is a(n) 32 year old female who presents today as a new patient for FBSE.    Today, the patient reports red spots on her forehead, neck and chest that she has noticed since having her second child. She also has noticed some brown lines underneath her fingernails and toenails, but they are present on exam today.     She also notes that red flaky itchy bumps will appear on  her neck that she has treated with eczema creams. She noticed it after applying some Vitamin C serum. Additionally, her daughter was recently diagnosed with KP and she believes this is related to the bumps on the patient's chest.    The patient otherwise denies any new or concerning lesions. No bleeding, painful, pruritic, or changing lesions. They report no personal history of skin cancer. There is a family history of skin cancer (grandfather- melanoma). No history of immunosuppression. A history of indoor tanning (>50 times in college). They use sunscreen and protective clothing when outdoors for sun protection. Health otherwise stable. No other skin concerns.     Patient is otherwise feeling well, without additional skin concerns.    Labs Reviewed:  N/A    Physical Exam:  Vitals: There were no vitals taken for this visit.  SKIN: Total skin, which includes the head/face, both arms, chest, back, abdomen,both legs, buttocks, digits and/or nails, was examined. Patient provided photos of nails also examined.   - No rash present on the neck today.  - There are dome shaped bright red papules on the face, trunk and extremities.   - Scattered perifollicular papules to bilateral posterior arms and chest.  - Right cheek, 3 mm skin colored papule.  - Multiple regular brown pigmented macules and papules are identified on the trunk and extremities.   - Scattered brown macules on sun exposed areas.  - No other lesions of concern on areas examined.     Medications:  Current Outpatient Medications   Medication     albuterol (PROVENTIL HFA) 108 (90 Base) MCG/ACT inhaler     amoxicillin (AMOXIL) 250 MG capsule     clindamycin (CLINDAMAX) 1 % external gel     levothyroxine (SYNTHROID/LEVOTHROID) 50 MCG tablet     methylphenidate HCl ER (CONCERTA) 18 MG CR tablet     Prenatal Vit-Fe Fumarate-FA (PRENATAL PO)     propranolol (INDERAL) 20 MG tablet     conjugated estrogens (PREMARIN) 0.625 MG/GM vaginal cream     estradiol (ESTRACE)  0.1 MG/GM vaginal cream     hydrOXYzine (ATARAX) 25 MG tablet     norethindrone (MICRONOR) 0.35 MG tablet     propranolol (INDERAL) 20 MG tablet     No current facility-administered medications for this visit.      Past Medical History:   Patient Active Problem List   Diagnosis     Gestational hypertension     Pelvic floor dysfunction     Allergic rhinitis     Anxiety     Attention deficit disorder     Hypothyroidism     Major depressive disorder, single episode, in remission (H)     Past Medical History:   Diagnosis Date     Depressive disorder      Thyroid disease      Uncomplicated asthma         CC Gauri Brice, APRN CNP  [address]  [place] [address] on close of this encounter.

## 2022-12-29 NOTE — NURSING NOTE
Chief Complaint   Patient presents with     Skin Check     Areas of concern include red spots on forehead, neck, chest, and a couple other spots as well. Pt also reports that she has noticed brown lines underneath fingernails and toenails, but not actively. Pt suspicious of keratosis pilaris due to daughter being diagnosed and bumps on chest.     [place], EMT

## 2022-12-29 NOTE — PATIENT INSTRUCTIONS
Ammonium lactate lotion (Amlactin) for     Patient Education     Checking for Skin Cancer  You can find cancer early by checking your skin each month. There are 3 kinds of skin cancer. They are melanoma, basal cell carcinoma, and squamous cell carcinoma. Doing monthly skin checks is the best way to find new marks or skin changes. Follow the instructions below for checking your skin.   The ABCDEs of checking moles for melanoma   Check your moles or growths for signs of melanoma using ABCDE:   Asymmetry: the sides of the mole or growth don t match  Border: the edges are ragged, notched, or blurred  Color: the color within the mole or growth varies  Diameter: the mole or growth is larger than 6 mm (size of a pencil eraser)  Evolving: the size, shape, or color of the mole or growth is changing (evolving is not shown in the images below)    Checking for other types of skin cancer  Basal cell carcinoma or squamous cell carcinoma have symptoms such as:     A spot or mole that looks different from all other marks on your skin  Changes in how an area feels, such as itching, tenderness, or pain  Changes in the skin's surface, such as oozing, bleeding, or scaliness  A sore that does not heal  New swelling or redness beyond the border of a mole    Who s at risk?  Anyone can get skin cancer. But you are at greater risk if you have:   Fair skin, light-colored hair, or light-colored eyes  Many moles or abnormal moles on your skin  A history of sunburns from sunlight or tanning beds  A family history of skin cancer  A history of exposure to radiation or chemicals  A weakened immune system  If you have had skin cancer in the past, you are at risk for recurring skin cancer.   How to check your skin  Do your monthly skin checkups in front of a full-length mirror. Check all parts of your body, including your:   Head (ears, face, neck, and scalp)  Torso (front, back, and sides)  Arms (tops, undersides, upper, and lower armpits)  Hands  (palms, backs, and fingers, including under the nails)  Buttocks and genitals  Legs (front, back, and sides)  Feet (tops, soles, toes, including under the nails, and between toes)  If you have a lot of moles, take digital photos of them each month. Make sure to take photos both up close and from a distance. These can help you see if any moles change over time.   Most skin changes are not cancer. But if you see any changes in your skin, call your doctor right away. Only he or she can diagnose a problem. If you have skin cancer, seeing your doctor can be the first step toward getting the treatment that could save your life.   [employer] last reviewed this educational content on 4/1/2019 2000-2020 [employer]. [address] [place] [address]. All rights reserved. This information is not intended as a substitute for professional medical care. Always follow your healthcare professional's instructions.       When should I call my doctor?  If you are worsening or not improving, please, contact us or seek urgent care as noted below.     Who should I call with questions (adults)?  [facility] (adult and pediatric): [phone]  [facility] (adult): [phone]  For urgent needs outside of business hours call the [facility] at [phone] and ask for the dermatology resident on call to be paged  If this is a medical emergency and you are unable to reach an ER, Call [phone]    Who should I call with questions (pediatric)?  [facility]- Pediatric Dermatology  Dr. Susan Siddiqui, Dr. Laila Johns, Dr. Lilly Storm, OSITO Campos, Dr. Marianela Arellano, Dr. Jeimy Mobley & Dr. Ahsan Davis  Non-urgent nurse triage line; [phone]- Estela and Carly OAKLEY Care Coordinatorapolinar Ramirez (/Complex ) [phone]    If you need a prescription refill, please contact your pharmacy.  Refills are approved or denied by our Physicians during normal business hours, Monday through Fridays  Per office policy, refills will not be granted if you have not been seen within the past year (or sooner depending on your child's condition)    Scheduling Information:  Pediatric Appointment Scheduling and Call Center [phone]  Radiology Scheduling- [phone]  Sedation Unit Scheduling- [phone]  [place] Scheduling- [facility] [phone]; Pediatric Dermatology [phone]  Main  Services: [phone]  Ecuadorean: [phone]  Indian: [phone]  Hmong/Tamazight/Belarusian: [phone]  Preadmission Nursing Department Fax Number: [phone] (Fax all pre-operative paperwork to this number)    For urgent matters arising during evenings, weekends, or holidays that cannot wait for normal business hours please call [phone] and ask for the dermatology resident on call to be paged.

## 2022-12-30 ENCOUNTER — TELEPHONE (OUTPATIENT)
Dept: DERMATOLOGY | Facility: CLINIC | Age: 32
End: 2022-12-30

## 2022-12-30 NOTE — TELEPHONE ENCOUNTER
Prior Authorization Retail Medication Request    Medication/Dose: tretinoin (RETIN-A) 0.025 % external cream    Insurance Name:  [employer]  Insurance ID:  [id number]

## 2022-12-30 NOTE — TELEPHONE ENCOUNTER
Central Prior Authorization Team   Phone: [phone]      PA Initiation    Medication: tretinoin (RETIN-A) 0.025 % external cream  Insurance Company: [employer] - Phone [phone] Fax [phone]  Pharmacy Filling the Rx: [id number] - [place], [place] - [address]  Filling Pharmacy Phone: [phone]  Filling Pharmacy Fax:    Start Date: 12/30/2022

## 2022-12-30 NOTE — TELEPHONE ENCOUNTER
Prior Authorization Approval    Authorization Effective Date: 12/30/2022  Authorization Expiration Date: 12/30/2023  Medication: tretinoin (RETIN-A) 0.025 % external cream-PA approved  Approved Dose/Quantity:   Reference #:     Insurance Company: [employer] - Phone [phone] Fax [phone]  Expected CoPay:       CoPay Card Available:      Foundation Assistance Needed:    Which Pharmacy is filling the prescription (Not needed for infusion/clinic administered): [id number] - [place], [place] - [address]  Pharmacy Notified: No  Patient Notified: Yes

## 2023-03-01 ENCOUNTER — MYC REFILL (OUTPATIENT)
Dept: FAMILY MEDICINE | Facility: CLINIC | Age: 33
End: 2023-03-01
Payer: COMMERCIAL

## 2023-03-01 DIAGNOSIS — F98.8 ATTENTION DEFICIT DISORDER (ADD) WITHOUT HYPERACTIVITY: ICD-10-CM

## 2023-03-02 RX ORDER — METHYLPHENIDATE HYDROCHLORIDE 18 MG/1
18 TABLET ORAL EVERY MORNING
Qty: 90 TABLET | Refills: 0 | Status: SHIPPED | OUTPATIENT
Start: 2023-03-02 | End: 2023-07-28

## 2023-03-02 NOTE — TELEPHONE ENCOUNTER
Requested Prescriptions   Pending Prescriptions Disp Refills     methylphenidate HCl ER (CONCERTA) 18 MG CR tablet 90 tablet 0     Sig: Take 1 tablet (18 mg) by mouth every morning       There is no refill protocol information for this order        Routing refill request to provider for review/approval because:  Drug not on the [facility] refill protocol     Roxanne Humphries RN  [facility]

## 2023-04-13 NOTE — PROGRESS NOTES
[facility]  Nurses Note -- 4 Eyes      4/13/2023       Skin assessed on: Q Shift      [x] No Pressure Injuries Present    [x]Prevention Measures Documented    [] Yes LDA  for Pressure Injury Previously documented     [] Yes New Pressure Injury Discovered   [] LDA for New Pressure Injury Added      Attending RN:  Luis M Lockhart RN     Second RN:  Karla Roach RN        Return OB visit    Subjective:  Patient reports active fetal movement, no vaginal bleeding or leaking fluid. She is having occasional B-H contractions, sometimes more painful but not consistent. She is having some occasional tinnitus but no other concerns.        Objective:  /79   Pulse 77   Temp 98.1  F (36.7  C)   Wt 85.9 kg (189 lb 6.4 oz)   LMP 2021   SpO2 100%   Breastfeeding No   BMI 29.23 kg/m     See OB flow sheet    Assessment and Plan    Mechelle Mata is a 31 year old  at 34w4d here for DANNY visit, pregnancy complicated by prior CS. Planning TOLAC.     This visit:  -Rx given for breast pump   -OB hemoglobin ordered, will draw at next visit     Next visit:  -GBS      RTC in 2 weeks or sooner ALIA Robles MD

## 2023-04-29 NOTE — PROGRESS NOTES
12w5d  complains of nausea.  B6 is helping.  Leaving today for a trip to [place].    discussed travel precautions, foods to avoid.  Reviewed normal US.  discussed new due date 12/7/19 based on US today. RR    Results for orders placed or performed in visit on 05/08/19   US OB < 14 Weeks Single    Narrative    Obstetrical Ultrasound Report  OB 1st trimester U/S -  Transvaginal  [facility]  Referring Provider: Elsa Berman MD  Sonographer: Jenelle Huynh RDMS RVT  Indication:  Viability check    Dating (mm/dd/yyyy):   LMP: unsure       EDC: 11/15/19         GA by LMP:  12w5d  Current Scan On:  05/08/19             EDC:  12/07/19        GA by Current Scan: 9w4d  The calculation of the gestational age by current scan was based on CRL.    Anatomy Scan:  Coughlin gestation.  Biometry:  CRL  2.8cm  9w4d                                     Yolk Sac  5.1mm                                                 Fetal heart rate: 180bpm  Findings: Viable IUP     Maternal Structures:  Cervix: The cervix appears long and closed.  Right Adnexa: normal  Left Adnexa: normal, corpus luteum    Impression:     Early coughlin IUP with yolk sac and cardiac activity, measures 9w 4d by   today's ultrasound, EDC 12/7/19, which is not consistent with menstrual   dating.  Recommend KADEN adjustment.    Shona Aden           patient

## 2023-05-02 ENCOUNTER — MYC MEDICAL ADVICE (OUTPATIENT)
Dept: FAMILY MEDICINE | Facility: CLINIC | Age: 33
End: 2023-05-02
Payer: COMMERCIAL

## 2023-05-02 DIAGNOSIS — E03.9 HYPOTHYROIDISM, UNSPECIFIED TYPE: ICD-10-CM

## 2023-05-03 RX ORDER — LEVOTHYROXINE SODIUM 50 UG/1
50 TABLET ORAL DAILY
Qty: 90 TABLET | Refills: 3 | Status: SHIPPED | OUTPATIENT
Start: 2023-05-03 | End: 2023-05-04

## 2023-05-03 NOTE — TELEPHONE ENCOUNTER
Please see [employer] message and advise.   Pt needs appt - last OC 3/5/21  Sent [employer] msg to schedule    Rx and TSH lab pended     Thank you.   Angela Ramon RN  [facility]

## 2023-05-03 NOTE — TELEPHONE ENCOUNTER
"Requested Prescriptions   Pending Prescriptions Disp Refills     levothyroxine (SYNTHROID/LEVOTHROID) 50 MCG tablet 90 tablet 3     Sig: Take 1 tablet (50 mcg) by mouth daily       Thyroid Protocol Passed - 5/3/2023  8:38 AM        Passed - Patient is 12 years or older        Passed - Recent (12 mo) or future (30 days) visit within the authorizing provider's specialty     Patient has had an office visit with the authorizing provider or a provider within the authorizing providers department within the previous 12 mos or has a future within next 30 days. See \"Patient Info\" tab in inbasket, or \"Choose Columns\" in Meds & Orders section of the refill encounter.            Passed - Medication is active on med list        Passed - Normal TSH on file in past 12 months     Recent Labs   Lab Test 10/17/22  1803   TSH 1.75            Passed - No active pregnancy on record     If patient is pregnant or has had a positive pregnancy test, please check TSH.        Passed - No positive pregnancy test in past 12 months     If patient is pregnant or has had a positive pregnancy test, please check TSH.             Pt has annual scheduled 6/6/23    Angela MAX RN  [facility]    "

## 2023-05-04 RX ORDER — LEVOTHYROXINE SODIUM 50 UG/1
50 TABLET ORAL DAILY
Qty: 90 TABLET | Refills: 3 | Status: SHIPPED | OUTPATIENT
Start: 2023-05-04 | End: 2024-05-02

## 2023-05-04 NOTE — TELEPHONE ENCOUNTER
Sent [employer] message relaying rx was sent to pharmacy pt requested.    Greyson Agustin RN   [facility]

## 2023-05-07 ENCOUNTER — HEALTH MAINTENANCE LETTER (OUTPATIENT)
Age: 33
End: 2023-05-07

## 2023-05-10 NOTE — PROGRESS NOTES
Discharge Note    Progress reporting period is from initial evaluation date (please see noted date below) to Marvin 10, 2022.  No linked episodes      Mechelle failed to follow up and current status is unknown.  Please see information below for last relevant information on current status.  Patient seen for 4 visits.    SUBJECTIVE  Subjective changes noted by patient:  The pt notes generally things have been feeling good. She has not had any pain with using dilators. She had a little discomfort with intercourse, but less and lessened. She is a little sore in her glutes, quads and core.  .  Current pain level is  .     Previous pain level was  1/10.   Changes in function:  Yes (See Goal flowsheet attached for changes in current functional level)  Adverse reaction to treatment or activity: None    OBJECTIVE  Changes noted in objective findings: observation during return to running screen: SL squat & step down off of 6inch step: hip drop on left and right side, internal vaginal PFM assessment: slight ttp along scar tissue at 6 o'clock 1-2nd layer of PFM, no ttp or tension OI, LA and bulbocavernosus bilaterally, sluggish relaxation after PFM contraction     ASSESSMENT/PLAN  Diagnosis: pelvic floor dysfunction   Updated problem list and treatment plan:   Impaired muscle performance - HEP  STG/LTGs have been met or progress has been made towards goals:  Yes, please see goal flowsheet for most current information  Assessment of Progress: current status is unknown.    Last current status: Pt is progressing as expected   Self Management Plans:  HEP  I have re-evaluated this patient and find that the nature, scope, duration and intensity of the therapy is appropriate for the medical condition of the patient.  Mechelle continues to require the following intervention to meet STG and LTG's:  HEP.    Recommendations:  Discharge with current home program.  Patient to follow up with MD as needed.    Please refer to the daily flowsheet for  treatment today, total treatment time and time spent performing 1:1 timed codes.

## 2023-05-11 RX ORDER — ACETAMINOPHEN AND CODEINE PHOSPHATE 120; 12 MG/5ML; MG/5ML
SOLUTION ORAL
Qty: 84 TABLET | Refills: 1 | Status: SHIPPED | OUTPATIENT
Start: 2023-05-11 | End: 2023-06-06

## 2023-05-11 NOTE — TELEPHONE ENCOUNTER
"Requested Prescriptions   Pending Prescriptions Disp Refills     norethindrone (MICRONOR) 0.35 MG tablet [Pharmacy Med Name: NORETHINDRONE 0.35 MG TABLET] 84 tablet 4     Sig: TAKE 1 TABLET BY MOUTH EVERY DAY       Contraceptives Protocol Failed - 5/11/2023 10:23 AM        Failed - Recent (12 mo) or future (30 days) visit within the authorizing provider's specialty     Patient has had an office visit with the authorizing provider or a provider within the authorizing providers department within the previous 12 mos or has a future within next 30 days. See \"Patient Info\" tab in inbasket, or \"Choose Columns\" in Meds & Orders section of the refill encounter.              Passed - Patient is not a current smoker if age is 35 or older        Passed - Medication is active on med list        Passed - No active pregnancy on record        Passed - No positive pregnancy test in past 12 months           Last Written Prescription Date:  4/13/22  Last Fill Quantity: 112,  # refills: 3   Last office visit: 3/8/2022 ; last virtual visit: 5/3/2022 with prescribing provider:  Dr. Ortiz  Future Office Visit:    None , FP in June    Medication is being filled for 1 time refill only due to:  Patient needs to be seen because it has been more than one year since last visit.   Sima Spencer RN on 5/11/2023 at 10:30 AM            "

## 2023-06-05 ASSESSMENT — ENCOUNTER SYMPTOMS
NERVOUS/ANXIOUS: 1
CONSTIPATION: 0
HEMATOCHEZIA: 0
DIARRHEA: 0
DYSURIA: 0
SORE THROAT: 0
JOINT SWELLING: 0
HEMATURIA: 0
BREAST MASS: 0
PALPITATIONS: 0
NAUSEA: 0
COUGH: 0
MYALGIAS: 1
WEAKNESS: 1
PARESTHESIAS: 0
HEADACHES: 1
ARTHRALGIAS: 0
HEARTBURN: 0
EYE PAIN: 0
CHILLS: 0
ABDOMINAL PAIN: 0
SHORTNESS OF BREATH: 0
FEVER: 0
FREQUENCY: 0
DIZZINESS: 0

## 2023-06-05 ASSESSMENT — PATIENT HEALTH QUESTIONNAIRE - PHQ9
SUM OF ALL RESPONSES TO PHQ QUESTIONS 1-9: 2
10. IF YOU CHECKED OFF ANY PROBLEMS, HOW DIFFICULT HAVE THESE PROBLEMS MADE IT FOR YOU TO DO YOUR WORK, TAKE CARE OF THINGS AT HOME, OR GET ALONG WITH OTHER PEOPLE: NOT DIFFICULT AT ALL
SUM OF ALL RESPONSES TO PHQ QUESTIONS 1-9: 2

## 2023-06-05 ASSESSMENT — ASTHMA QUESTIONNAIRES: ACT_TOTALSCORE: 25

## 2023-06-06 ENCOUNTER — OFFICE VISIT (OUTPATIENT)
Dept: FAMILY MEDICINE | Facility: CLINIC | Age: 33
End: 2023-06-06
Payer: COMMERCIAL

## 2023-06-06 VITALS
WEIGHT: 155 LBS | TEMPERATURE: 98.5 F | DIASTOLIC BLOOD PRESSURE: 60 MMHG | RESPIRATION RATE: 12 BRPM | HEART RATE: 71 BPM | HEIGHT: 68 IN | SYSTOLIC BLOOD PRESSURE: 122 MMHG | OXYGEN SATURATION: 99 % | BODY MASS INDEX: 23.49 KG/M2

## 2023-06-06 DIAGNOSIS — F98.8 ATTENTION DEFICIT DISORDER (ADD) WITHOUT HYPERACTIVITY: ICD-10-CM

## 2023-06-06 DIAGNOSIS — Z00.00 ROUTINE GENERAL MEDICAL EXAMINATION AT A HEALTH CARE FACILITY: Primary | ICD-10-CM

## 2023-06-06 DIAGNOSIS — Z30.011 ENCOUNTER FOR INITIAL PRESCRIPTION OF CONTRACEPTIVE PILLS: ICD-10-CM

## 2023-06-06 DIAGNOSIS — F41.9 ANXIETY: ICD-10-CM

## 2023-06-06 DIAGNOSIS — M79.10 MUSCLE PAIN: ICD-10-CM

## 2023-06-06 DIAGNOSIS — R53.82 CHRONIC FATIGUE: ICD-10-CM

## 2023-06-06 LAB
BASOPHILS # BLD AUTO: 0 10E3/UL (ref 0–0.2)
BASOPHILS NFR BLD AUTO: 1 %
EOSINOPHIL # BLD AUTO: 0.1 10E3/UL (ref 0–0.7)
EOSINOPHIL NFR BLD AUTO: 2 %
ERYTHROCYTE [DISTWIDTH] IN BLOOD BY AUTOMATED COUNT: 13.7 % (ref 10–15)
HCT VFR BLD AUTO: 41.7 % (ref 35–47)
HGB BLD-MCNC: 13.6 G/DL (ref 11.7–15.7)
IMM GRANULOCYTES # BLD: 0 10E3/UL
IMM GRANULOCYTES NFR BLD: 0 %
LYMPHOCYTES # BLD AUTO: 1.1 10E3/UL (ref 0.8–5.3)
LYMPHOCYTES NFR BLD AUTO: 25 %
MCH RBC QN AUTO: 30.1 PG (ref 26.5–33)
MCHC RBC AUTO-ENTMCNC: 32.6 G/DL (ref 31.5–36.5)
MCV RBC AUTO: 92 FL (ref 78–100)
MONOCYTES # BLD AUTO: 0.8 10E3/UL (ref 0–1.3)
MONOCYTES NFR BLD AUTO: 17 %
NEUTROPHILS # BLD AUTO: 2.5 10E3/UL (ref 1.6–8.3)
NEUTROPHILS NFR BLD AUTO: 55 %
PLATELET # BLD AUTO: 153 10E3/UL (ref 150–450)
RBC # BLD AUTO: 4.52 10E6/UL (ref 3.8–5.2)
WBC # BLD AUTO: 4.5 10E3/UL (ref 4–11)

## 2023-06-06 PROCEDURE — 80050 GENERAL HEALTH PANEL: CPT | Performed by: NURSE PRACTITIONER

## 2023-06-06 PROCEDURE — 83550 IRON BINDING TEST: CPT | Performed by: NURSE PRACTITIONER

## 2023-06-06 PROCEDURE — 82728 ASSAY OF FERRITIN: CPT | Performed by: NURSE PRACTITIONER

## 2023-06-06 PROCEDURE — 82550 ASSAY OF CK (CPK): CPT | Performed by: NURSE PRACTITIONER

## 2023-06-06 PROCEDURE — 83540 ASSAY OF IRON: CPT | Performed by: NURSE PRACTITIONER

## 2023-06-06 PROCEDURE — 99214 OFFICE O/P EST MOD 30 MIN: CPT | Mod: 25 | Performed by: NURSE PRACTITIONER

## 2023-06-06 PROCEDURE — 36415 COLL VENOUS BLD VENIPUNCTURE: CPT | Performed by: NURSE PRACTITIONER

## 2023-06-06 PROCEDURE — 99395 PREV VISIT EST AGE 18-39: CPT | Performed by: NURSE PRACTITIONER

## 2023-06-06 RX ORDER — METHYLPHENIDATE HYDROCHLORIDE 27 MG/1
27 TABLET ORAL EVERY MORNING
Qty: 90 TABLET | Refills: 0 | Status: SHIPPED | OUTPATIENT
Start: 2023-06-06 | End: 2023-09-07

## 2023-06-06 RX ORDER — PROPRANOLOL HYDROCHLORIDE 20 MG/1
20 TABLET ORAL 3 TIMES DAILY PRN
Qty: 90 TABLET | Refills: 3 | Status: SHIPPED | OUTPATIENT
Start: 2023-06-06

## 2023-06-06 ASSESSMENT — ENCOUNTER SYMPTOMS
FEVER: 0
DIZZINESS: 0
SORE THROAT: 0
NERVOUS/ANXIOUS: 1
HEMATURIA: 0
BREAST MASS: 0
HEARTBURN: 0
ARTHRALGIAS: 0
MYALGIAS: 1
DIARRHEA: 0
HEMATOCHEZIA: 0
SHORTNESS OF BREATH: 0
EYE PAIN: 0
CONSTIPATION: 0
HEADACHES: 1
PALPITATIONS: 0
WEAKNESS: 1
FREQUENCY: 0
DYSURIA: 0
COUGH: 0
ABDOMINAL PAIN: 0
PARESTHESIAS: 0
NAUSEA: 0
CHILLS: 0
JOINT SWELLING: 0

## 2023-06-06 NOTE — PATIENT INSTRUCTIONS
"You can take any anti-histamine as long a it doesn't have \"D\" after it.  Look for loratidine, cetirizine or fenofexadine - doesn't matter which.  Also add the nasal steroid fluticasone.  I usually find that adding a steroid nasal spray helps a lot with all symptoms.  One trick with using these is to only insert the nozzle slightly and then tip to point toward your eye rather than straight back.  Do not take a deep breath with administration.  These measures keep it from going back into your throat where it doesn't work, can irritate your throat and tastes awful.    "

## 2023-06-06 NOTE — PROGRESS NOTES
"   SUBJECTIVE:   CC: Mechelle is an 32 year old who presents for preventive health visit.       6/6/2023     4:09 PM   Additional Questions   Roomed by Lashell     Healthy Habits:     Getting at least 3 servings of Calcium per day:  Yes    Bi-annual eye exam:  NO    Dental care twice a year:  Yes    Sleep apnea or symptoms of sleep apnea:  None    Diet:  Regular (no restrictions)    Frequency of exercise:  2-3 days/week    Duration of exercise:  15-30 minutes    Taking medications regularly:  Yes    Medication side effects:  Not applicable    PHQ-2 Total Score: 0    Additional concerns today:  No    HM -    Cancer screenings - pap UTD, none other indicated   Chronic conditions screenings - thyroid UTD, sees dermatology every two years   Immunizations - thyroid up to date   Habits  -   Exercise - twice a week strength training at home - squats, lunges, chest presses; walking and playing with kids.       Nutrition - fairly balanced, but could be better. Mostly eating at home. Eating vegetables  Sexual health - would like to go back on regular birth control - no personal history of blood clots, no migraine with aura, non-smoker  Goals - get back into exercising, figure out sleep  Challenges - figuring out how to get everything done    Fatigue  - and muscle weakness. Will have weakness and soreness - often in quads and glutes - even if hasn't worked out. Goes away within a few hours. Not getting a lot of sleep - falls asleep 10:30-11:30 and up at 6 am. Does better with 9 hours of sleep. Takes a long time to fall asleep. Takes propranolol and this helps calm her body down and can fall asleep easier. If takes unisom, as she did in pregnancy, feels \"hung over.\" Even without the unisom, has felt hung over. Sometimes waking in the night. Snoring a little bit. Not stopping breathing. Having restless legs - didn't have this before pregnancy and is worsening. Not drinking much anymore because it exacerbates anxiety. Chiropractor " recommended arnie and 5HTP at night    Anxiety - up and down, increases in the week before period. Propranolol is helpful, generally takes it at night.    15 months postpartum - back to pre-pregnancy weight  Wt Readings from Last 5 Encounters:   23 70.3 kg (155 lb)   22 79.4 kg (175 lb)   22 82.1 kg (180 lb 14.4 oz)   22 88.9 kg (196 lb)   22 89.2 kg (196 lb 9.6 oz)     ADHD - mind wandering during work after a couple hours and concentration is lower.     Magnesium 500 mg QHS  Vitamin D 5000 international unit(s)   Prenatal which has some iron    Have you ever done Advance Care Planning? (For example, a Health Directive, POLST, or a discussion with a medical provider or your loved ones about your wishes): No, advance care planning information given to patient to review.  Patient plans to discuss their wishes with loved ones or provider.      Social History     Tobacco Use     Smoking status: Never     Smokeless tobacco: Never   Vaping Use     Vaping status: Never Used   Substance Use Topics     Alcohol use: Yes     Alcohol/week: 1.0 standard drink of alcohol     Types: 1 Standard drinks or equivalent per week           2023     9:36 AM   Alcohol Use   Prescreen: >3 drinks/day or >7 drinks/week? No     Reviewed orders with patient.  Reviewed health maintenance and updated orders accordingly - Yes  Lab work is in process  Labs reviewed in EPIC    Breast Cancer Screenin/5/2023     9:37 AM   Breast CA Risk Assessment (FHS-7)   Do you have a family history of breast, colon, or ovarian cancer? No / Unknown       click delete button to remove this line now  Patient under 40 years of age: Routine Mammogram Screening not recommended.   Pertinent mammograms are reviewed under the imaging tab.    History of abnormal Pap smear: NO - age 30-65 PAP every 5 years with negative HPV co-testing recommended      Latest Ref Rng & Units 3/8/2021     8:50 AM 3/5/2021     4:30 PM   PAP / HPV  "  PAP (Historical)  NIL      HPV 16 DNA NEG^Negative  Negative     HPV 18 DNA NEG^Negative  Negative     Other HR HPV NEG^Negative  Negative       Reviewed and updated as needed this visit by clinical staff   Tobacco  Allergies  Meds              Reviewed and updated as needed this visit by Provider                     Review of Systems   Constitutional: Negative for chills and fever.   HENT: Negative for congestion, ear pain, hearing loss and sore throat.    Eyes: Negative for pain and visual disturbance.   Respiratory: Negative for cough and shortness of breath.    Cardiovascular: Negative for chest pain, palpitations and peripheral edema.   Gastrointestinal: Negative for abdominal pain, constipation, diarrhea, heartburn, hematochezia and nausea.   Breasts:  Negative for tenderness, breast mass and discharge.   Genitourinary: Negative for dysuria, frequency, genital sores, hematuria, pelvic pain, urgency, vaginal bleeding and vaginal discharge.   Musculoskeletal: Positive for myalgias. Negative for arthralgias and joint swelling.   Skin: Negative for rash.   Neurological: Positive for weakness and headaches. Negative for dizziness and paresthesias.   Psychiatric/Behavioral: Negative for mood changes. The patient is nervous/anxious.           OBJECTIVE:   /60   Pulse 71   Temp 98.5  F (36.9  C) (Temporal)   Resp 12   Ht 1.72 m (5' 7.72\")   Wt 70.3 kg (155 lb)   LMP  (LMP Unknown)   SpO2 99%   Breastfeeding Yes   BMI 23.77 kg/m    Physical Exam  GENERAL: healthy, alert and no distress  EYES: Eyes grossly normal to inspection, PERRL and conjunctivae and sclerae normal  HENT: ear canals and TM's normal, nose and mouth without ulcers or lesions  NECK: no adenopathy, no asymmetry, masses, or scars and thyroid normal to palpation  RESP: lungs clear to auscultation - no rales, rhonchi or wheezes  BREAST: normal without masses, tenderness or nipple discharge and no palpable axillary masses or " adenopathy  CV: regular rate and rhythm, normal S1 S2, no S3 or S4, no murmur, click or rub, no peripheral edema and peripheral pulses strong  ABDOMEN: soft, nontender, no hepatosplenomegaly, no masses and bowel sounds normal  MS: no gross musculoskeletal defects noted, no edema  SKIN: no suspicious lesions or rashes  NEURO: Normal strength and tone, mentation intact and speech normal  PSYCH: mentation appears normal, affect normal/bright    Diagnostic Test Results:  Labs reviewed in Epic  Results for orders placed or performed in visit on 06/06/23   CK total     Status: Normal   Result Value Ref Range    CK 46 26 - 192 U/L   TSH with free T4 reflex     Status: Normal   Result Value Ref Range    TSH 1.96 0.30 - 4.20 uIU/mL   Comprehensive metabolic panel (BMP + Alb, Alk Phos, ALT, AST, Total. Bili, TP)     Status: Normal   Result Value Ref Range    Sodium 140 136 - 145 mmol/L    Potassium 4.2 3.4 - 5.3 mmol/L    Chloride 105 98 - 107 mmol/L    Carbon Dioxide (CO2) 22 22 - 29 mmol/L    Anion Gap 13 7 - 15 mmol/L    Urea Nitrogen 14.0 6.0 - 20.0 mg/dL    Creatinine 0.79 0.51 - 0.95 mg/dL    Calcium 9.1 8.6 - 10.0 mg/dL    Glucose 92 70 - 99 mg/dL    Alkaline Phosphatase 49 35 - 104 U/L    AST 19 10 - 35 U/L    ALT 21 10 - 35 U/L    Protein Total 6.7 6.4 - 8.3 g/dL    Albumin 4.6 3.5 - 5.2 g/dL    Bilirubin Total 0.3 <=1.2 mg/dL    GFR Estimate >90 >60 mL/min/1.73m2   Ferritin     Status: Normal   Result Value Ref Range    Ferritin 67 6 - 175 ng/mL   Iron and iron binding capacity     Status: Abnormal   Result Value Ref Range    Iron 17 (L) 37 - 145 ug/dL    Iron Binding Capacity 260 240 - 430 ug/dL    Iron Sat Index 7 (L) 15 - 46 %   CBC with platelets and differential     Status: None   Result Value Ref Range    WBC Count 4.5 4.0 - 11.0 10e3/uL    RBC Count 4.52 3.80 - 5.20 10e6/uL    Hemoglobin 13.6 11.7 - 15.7 g/dL    Hematocrit 41.7 35.0 - 47.0 %    MCV 92 78 - 100 fL    MCH 30.1 26.5 - 33.0 pg    MCHC 32.6 31.5 -  36.5 g/dL    RDW 13.7 10.0 - 15.0 %    Platelet Count 153 150 - 450 10e3/uL    % Neutrophils 55 %    % Lymphocytes 25 %    % Monocytes 17 %    % Eosinophils 2 %    % Basophils 1 %    % Immature Granulocytes 0 %    Absolute Neutrophils 2.5 1.6 - 8.3 10e3/uL    Absolute Lymphocytes 1.1 0.8 - 5.3 10e3/uL    Absolute Monocytes 0.8 0.0 - 1.3 10e3/uL    Absolute Eosinophils 0.1 0.0 - 0.7 10e3/uL    Absolute Basophils 0.0 0.0 - 0.2 10e3/uL    Absolute Immature Granulocytes 0.0 <=0.4 10e3/uL   CBC with platelets and differential     Status: None    Narrative    The following orders were created for panel order CBC with platelets and differential.  Procedure                               Abnormality         Status                     ---------                               -----------         ------                     CBC with platelets and d...[[id number]]                      Final result                 Please view results for these tests on the individual orders.       ASSESSMENT/PLAN:   (Z00.00) Routine general medical examination at a health care facility  (primary encounter diagnosis)  Comment:   Plan: Exercise and nutrition doing well. Continue to expand on good baseline    (R53.82) Chronic fatigue  Comment:   Plan: CBC with platelets and differential, CK total,         TSH with free T4 reflex, Comprehensive         metabolic panel (BMP + Alb, Alk Phos, ALT, AST,        Total. Bili, TP), Ferritin, Iron and iron         binding capacity   Discussed fatigue work up is generally non-specific and often doesn't have results pointing to clear cause. Would have ferritin goal of 50 for iron supplementation.  The accompanying muscle pain is odd in that it is not related to specific activity. Not a common side effect of medications. Rule out any clear/common underlying problems and otherwise monitor.    (Z30.011) Encounter for initial prescription of contraceptive pills  Comment:   Plan: norgestrel-ethinyl estradiol (LO/OVRAL)  0.3-30         MG-MCG tablet    Restart BRENNA    (M79.10) Muscle pain  Comment:   Plan: CBC with platelets and differential, CK total,         TSH with free T4 reflex, Comprehensive         metabolic panel (BMP + Alb, Alk Phos, ALT, AST,        Total. 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 Continue methylphenidate.       Patient has been advised of split billing requirements and indicates understanding: Yes      COUNSELING:  Reviewed preventive health counseling, as reflected in patient instructions        She reports that she has never smoked. She has never used smokeless tobacco.    ISAIAS Larios [facility]  Answers for HPI/ROS submitted by the patient on 6/5/2023  If you checked off any problems, how difficult have these problems made it for you to do your work, take care of things at home, or get along with other people?: Not difficult at all  PHQ9 TOTAL SCORE: 2

## 2023-06-07 LAB
ALBUMIN SERPL BCG-MCNC: 4.6 G/DL (ref 3.5–5.2)
ALP SERPL-CCNC: 49 U/L (ref 35–104)
ALT SERPL W P-5'-P-CCNC: 21 U/L (ref 10–35)
ANION GAP SERPL CALCULATED.3IONS-SCNC: 13 MMOL/L (ref 7–15)
AST SERPL W P-5'-P-CCNC: 19 U/L (ref 10–35)
BILIRUB SERPL-MCNC: 0.3 MG/DL
BUN SERPL-MCNC: 14 MG/DL (ref 6–20)
CALCIUM SERPL-MCNC: 9.1 MG/DL (ref 8.6–10)
CHLORIDE SERPL-SCNC: 105 MMOL/L (ref 98–107)
CK SERPL-CCNC: 46 U/L (ref 26–192)
CREAT SERPL-MCNC: 0.79 MG/DL (ref 0.51–0.95)
DEPRECATED HCO3 PLAS-SCNC: 22 MMOL/L (ref 22–29)
FERRITIN SERPL-MCNC: 67 NG/ML (ref 6–175)
GFR SERPL CREATININE-BSD FRML MDRD: >90 ML/MIN/1.73M2
GLUCOSE SERPL-MCNC: 92 MG/DL (ref 70–99)
IRON BINDING CAPACITY (ROCHE): 260 UG/DL (ref 240–430)
IRON SATN MFR SERPL: 7 % (ref 15–46)
IRON SERPL-MCNC: 17 UG/DL (ref 37–145)
POTASSIUM SERPL-SCNC: 4.2 MMOL/L (ref 3.4–5.3)
PROT SERPL-MCNC: 6.7 G/DL (ref 6.4–8.3)
SODIUM SERPL-SCNC: 140 MMOL/L (ref 136–145)
TSH SERPL DL<=0.005 MIU/L-ACNC: 1.96 UIU/ML (ref 0.3–4.2)

## 2023-06-08 ENCOUNTER — MYC MEDICAL ADVICE (OUTPATIENT)
Dept: FAMILY MEDICINE | Facility: CLINIC | Age: 33
End: 2023-06-08
Payer: COMMERCIAL

## 2023-06-14 NOTE — RESULT ENCOUNTER NOTE
Mechelle,    The CK test was normal - this means that the muscle pain is not from damage to the muscle or muscle inflammation. Thyroid, kidney function and electrolytes were all also normal.    Hemoglobin and the rest of the CBC were normal and ferritin was both normal and at a good level (above 50).    If you have any questions, please feel free to contact the clinic.    KARLY Child

## 2023-07-27 ENCOUNTER — MYC MEDICAL ADVICE (OUTPATIENT)
Dept: FAMILY MEDICINE | Facility: CLINIC | Age: 33
End: 2023-07-27
Payer: COMMERCIAL

## 2023-07-27 DIAGNOSIS — F98.8 ATTENTION DEFICIT DISORDER (ADD) WITHOUT HYPERACTIVITY: ICD-10-CM

## 2023-07-28 RX ORDER — METHYLPHENIDATE HYDROCHLORIDE 18 MG/1
18 TABLET ORAL EVERY MORNING
Qty: 90 TABLET | Refills: 0 | Status: SHIPPED | OUTPATIENT
Start: 2023-07-28 | End: 2023-10-29

## 2023-07-28 NOTE — TELEPHONE ENCOUNTER
Requested Prescriptions   Pending Prescriptions Disp Refills    methylphenidate HCl ER (OSM) (CONCERTA) 18 MG CR tablet 90 tablet 0     Sig: Take 1 tablet (18 mg) by mouth every morning       There is no refill protocol information for this order        Angela MAX RN  [facility]

## 2023-07-28 NOTE — TELEPHONE ENCOUNTER
Please see [employer] message   Discontinue Concerta 27MG once replaced    Order T'd    Thank you.   Angela Ramon RN  [facility]

## 2023-07-30 ENCOUNTER — MYC MEDICAL ADVICE (OUTPATIENT)
Dept: FAMILY MEDICINE | Facility: CLINIC | Age: 33
End: 2023-07-30
Payer: COMMERCIAL

## 2023-07-30 DIAGNOSIS — R53.83 OTHER FATIGUE: Primary | ICD-10-CM

## 2023-07-30 DIAGNOSIS — M79.10 MUSCLE PAIN: ICD-10-CM

## 2023-07-30 DIAGNOSIS — F98.8 ATTENTION DEFICIT DISORDER (ADD) WITHOUT HYPERACTIVITY: ICD-10-CM

## 2023-08-01 DIAGNOSIS — Z87.440 HISTORY OF UTI: ICD-10-CM

## 2023-08-01 NOTE — TELEPHONE ENCOUNTER
I haven't ever seen or treated patient for rosacea. Wondering if this was supposed to go to derm.  K. KARLY Toussaint

## 2023-08-02 RX ORDER — AMOXICILLIN 250 MG/1
250 CAPSULE ORAL
Qty: 30 CAPSULE | Refills: 3 | OUTPATIENT
Start: 2023-08-02

## 2023-08-03 RX ORDER — AMOXICILLIN 250 MG/1
250 CAPSULE ORAL
Qty: 30 CAPSULE | Refills: 3 | Status: SHIPPED | OUTPATIENT
Start: 2023-08-03

## 2023-08-03 RX ORDER — METHYLPHENIDATE HYDROCHLORIDE 18 MG/1
18 TABLET ORAL EVERY MORNING
Qty: 30 TABLET | Refills: 0 | Status: SHIPPED | OUTPATIENT
Start: 2023-08-26 | End: 2024-04-05

## 2023-08-03 NOTE — TELEPHONE ENCOUNTER
Initially rx came to me associated with rosacea. It is now clear it is for history of UTI. I did refill medication.  KARLY Child

## 2023-08-18 ENCOUNTER — LAB (OUTPATIENT)
Dept: LAB | Facility: CLINIC | Age: 33
End: 2023-08-18
Payer: COMMERCIAL

## 2023-08-18 DIAGNOSIS — M79.10 MUSCLE PAIN: ICD-10-CM

## 2023-08-18 DIAGNOSIS — R53.83 OTHER FATIGUE: ICD-10-CM

## 2023-08-18 LAB
ALBUMIN SERPL BCG-MCNC: 4.8 G/DL (ref 3.5–5.2)
ALP SERPL-CCNC: 46 U/L (ref 35–104)
ALT SERPL W P-5'-P-CCNC: 16 U/L (ref 0–50)
ANION GAP SERPL CALCULATED.3IONS-SCNC: 10 MMOL/L (ref 7–15)
AST SERPL W P-5'-P-CCNC: 20 U/L (ref 0–45)
BILIRUB SERPL-MCNC: 0.5 MG/DL
BUN SERPL-MCNC: 14.8 MG/DL (ref 6–20)
CALCIUM SERPL-MCNC: 9.6 MG/DL (ref 8.6–10)
CHLORIDE SERPL-SCNC: 106 MMOL/L (ref 98–107)
CK SERPL-CCNC: 79 U/L (ref 26–192)
CREAT SERPL-MCNC: 0.89 MG/DL (ref 0.51–0.95)
DEPRECATED HCO3 PLAS-SCNC: 27 MMOL/L (ref 22–29)
ERYTHROCYTE [DISTWIDTH] IN BLOOD BY AUTOMATED COUNT: 13.4 % (ref 10–15)
GFR SERPL CREATININE-BSD FRML MDRD: 87 ML/MIN/1.73M2
GLUCOSE SERPL-MCNC: 89 MG/DL (ref 70–99)
HCT VFR BLD AUTO: 40.6 % (ref 35–47)
HGB BLD-MCNC: 13.6 G/DL (ref 11.7–15.7)
IRON BINDING CAPACITY (ROCHE): 271 UG/DL (ref 240–430)
IRON SATN MFR SERPL: 39 % (ref 15–46)
IRON SERPL-MCNC: 107 UG/DL (ref 37–145)
MCH RBC QN AUTO: 30.1 PG (ref 26.5–33)
MCHC RBC AUTO-ENTMCNC: 33.5 G/DL (ref 31.5–36.5)
MCV RBC AUTO: 90 FL (ref 78–100)
PLATELET # BLD AUTO: 192 10E3/UL (ref 150–450)
POTASSIUM SERPL-SCNC: 4.2 MMOL/L (ref 3.4–5.3)
PROT SERPL-MCNC: 6.6 G/DL (ref 6.4–8.3)
RBC # BLD AUTO: 4.52 10E6/UL (ref 3.8–5.2)
SODIUM SERPL-SCNC: 143 MMOL/L (ref 136–145)
TSH SERPL DL<=0.005 MIU/L-ACNC: 1.42 UIU/ML (ref 0.3–4.2)
WBC # BLD AUTO: 6.5 10E3/UL (ref 4–11)

## 2023-08-18 PROCEDURE — 85027 COMPLETE CBC AUTOMATED: CPT

## 2023-08-18 PROCEDURE — 83540 ASSAY OF IRON: CPT

## 2023-08-18 PROCEDURE — 83550 IRON BINDING TEST: CPT

## 2023-08-18 PROCEDURE — 80053 COMPREHEN METABOLIC PANEL: CPT

## 2023-08-18 PROCEDURE — 84443 ASSAY THYROID STIM HORMONE: CPT

## 2023-08-18 PROCEDURE — 82550 ASSAY OF CK (CPK): CPT

## 2023-08-18 PROCEDURE — 36415 COLL VENOUS BLD VENIPUNCTURE: CPT

## 2023-09-07 ENCOUNTER — VIRTUAL VISIT (OUTPATIENT)
Dept: FAMILY MEDICINE | Facility: CLINIC | Age: 33
End: 2023-09-07
Payer: COMMERCIAL

## 2023-09-07 DIAGNOSIS — J30.1 SEASONAL ALLERGIC RHINITIS DUE TO POLLEN: ICD-10-CM

## 2023-09-07 DIAGNOSIS — G25.81 RESTLESS LEGS SYNDROME (RLS): Primary | ICD-10-CM

## 2023-09-07 PROCEDURE — 99214 OFFICE O/P EST MOD 30 MIN: CPT | Mod: 95 | Performed by: NURSE PRACTITIONER

## 2023-09-07 RX ORDER — MOMETASONE FUROATE MONOHYDRATE 50 UG/1
2 SPRAY, METERED NASAL DAILY
Qty: 17 G | Refills: 11 | Status: SHIPPED | OUTPATIENT
Start: 2023-09-07 | End: 2024-04-05

## 2023-09-07 RX ORDER — IPRATROPIUM BROMIDE 42 UG/1
2 SPRAY, METERED NASAL 4 TIMES DAILY
Qty: 15 ML | Refills: 11 | Status: SHIPPED | OUTPATIENT
Start: 2023-09-07 | End: 2024-04-05

## 2023-09-07 RX ORDER — AZELASTINE 1 MG/ML
1 SPRAY, METERED NASAL 2 TIMES DAILY
Qty: 30 ML | Refills: 11 | Status: SHIPPED | OUTPATIENT
Start: 2023-09-07 | End: 2024-06-07

## 2023-09-07 NOTE — PATIENT INSTRUCTIONS
Restless legs  Magnesium glycinate - maybe try a powder form  Consider magnesium topical (lotion, oil, spray) with massage nightly - I know you can get this at [employer]  Consider getting a pedal thing to keep legs moving throughout the day to supplement exercise  If these aren't working we can consider med additions or changes   - Mirapex   - gabapentin   - increase estrogen component in birth control    Allergies   - NeilMed nasal irrigation up to four times a day   - three different nasal spray options     - Atrovent (ipratropium) - anticholinergic agent     - Astelin (azelastine) - antihistamine     - Nasonex (mometasone) - steroid    Iron - continue every other day iron indefinitely

## 2023-09-07 NOTE — PROGRESS NOTES
Mechelle is a 33 year old who is being evaluated via a billable video visit.      How would you like to obtain your AVS? [employer]  If the video visit is dropped, the invitation should be resent by: Text to cell phone: [phone]  Will anyone else be joining your video visit? No        Assessment & Plan     Restless legs syndrome (RLS)  Discussed medication options of mirapex, gabapentin and increasing estrogen component of oral birth control. Of these, patient would prefer gabapentin. At this time, Mechelle wants to pursue more behavioral options. She will look into an alternative form of oral magnesium, consider topical magnesium with massage. Consider a way to exercise/move legs in the day. She is planning to increase meditation.  If these are not working, she will contact me through [employer] and I will write for gabapentin 100-300 mg at bedtime to start.    Seasonal allergic rhinitis due to pollen  Looking for options other than oral antihistamine which has been increasing RLS symptoms. Nasal target brand fluticasone did also increase RLS symptoms, as well. Offered the below options. Also advise nasal saline rinses.   - azelastine (ASTELIN) 0.1 % nasal spray; Spray 1 spray into both nostrils 2 times daily  - mometasone (NASONEX) 50 MCG/ACT nasal spray; Spray 2 sprays into both nostrils daily  - ipratropium (ATROVENT) 0.06 % nasal spray; Spray 2 sprays into both nostrils 4 times daily    Sleep  No good medication sleep aid options. Unisom increasing RLS symptoms. Will continue to use propranolol at this time.    Prescription drug management  I spent a total of 61 minutes on the day of the visit.   Time spent by me doing chart review, history and exam, documentation and further activities per the note       Patient Instructions   Restless legs  Magnesium glycinate - maybe try a powder form  Consider magnesium topical (lotion, oil, spray) with massage nightly - I know you can get this at [facility] [employer]  Consider getting a  pedal thing to keep legs moving throughout the day to supplement exercise  If these aren't working we can consider med additions or changes   - Mirapex   - gabapentin   - increase estrogen component in birth control    Allergies   - NeilMed nasal irrigation up to four times a day   - three different nasal spray options     - Atrovent (ipratropium) - anticholinergic agent     - Astelin (azelastine) - antihistamine     - Nasonex (mometasone) - steroid    ISAIAS Larios CNP  [facility]    Gulshan Min is a 33 year old, presenting for the following health issues:  Anemia        6/6/2023     4:09 PM   Additional Questions   Roomed by Lashell       History of Present Illness       Reason for visit:  Iron f/u    She eats 2-3 servings of fruits and vegetables daily.She consumes 0 sweetened beverage(s) daily.She exercises with enough effort to increase her heart rate 10 to 19 minutes per day.  She exercises with enough effort to increase her heart rate 4 days per week.   She is taking medications regularly.     Summer was very stressful. Mom was really ill and hospitalized for septic shock. She is home now. Went on a trip to [place] last week with  and kids.     Feeling physically better and thinks that the iron 65 mg every other day has been helping, as has lowering Concerta dose. Can get through work outs without as much fatigue. RLS got better, but is not resolved and is bothering her more emotionally now. Has noticed that allergy medications and sleep medicine (unisom) make it worse. Compression socks 20-30 mmHg help. Taking magnesium oxide 750 mg (increased from 500 mg three weeks ago) and now taking at dinner. Family history of RLS.      Component      Latest Ref Rng 6/6/2023  5:42 PM 8/18/2023  1:07 PM   RBC Count      3.80 - 5.20 10e6/uL 4.52  4.52    Hemoglobin      11.7 - 15.7 g/dL 13.6  13.6    Hematocrit      35.0 - 47.0 % 41.7  40.6    MCV      78 - 100 fL 92  90     MCH      26.5 - 33.0 pg 30.1  30.1    MCHC      31.5 - 36.5 g/dL 32.6  33.5    RDW      10.0 - 15.0 % 13.7  13.4        Component      Latest Ref Rng 6/6/2023  5:42 PM 8/18/2023  1:07 PM   Iron      37 - 145 ug/dL 17 (L)  107    Iron Binding Capacity      240 - 430 ug/dL 260  271    Iron Sat Index      15 - 46 % 7 (L)  39    CK Total      26 - 192 U/L 46  79    TSH      0.30 - 4.20 uIU/mL 1.96  1.42    Ferritin      6 - 175 ng/mL 67          Review of Systems         Objective           Vitals:  No vitals were obtained today due to virtual visit.    Physical Exam   GENERAL: Healthy, alert and no distress  EYES: Eyes grossly normal to inspection.  No discharge or erythema, or obvious scleral/conjunctival abnormalities.  RESP: No audible wheeze, cough, or visible cyanosis.  No visible retractions or increased work of breathing.    SKIN: Visible skin clear. No significant rash, abnormal pigmentation or lesions.  NEURO: Cranial nerves grossly intact.  Mentation and speech appropriate for age.  PSYCH: Mentation appears normal, affect normal/bright, judgement and insight intact, normal speech and appearance well-groomed.          Video-Visit Details    Type of service:  Video Visit   Originating Location (pt. Location): Home    Distant Location (provider location):  Off-site  Platform used for Video Visit: [employer]

## 2023-09-12 ENCOUNTER — TELEPHONE (OUTPATIENT)
Dept: FAMILY MEDICINE | Facility: CLINIC | Age: 33
End: 2023-09-12
Payer: COMMERCIAL

## 2023-09-12 DIAGNOSIS — J30.1 SEASONAL ALLERGIC RHINITIS DUE TO POLLEN: ICD-10-CM

## 2023-09-12 NOTE — TELEPHONE ENCOUNTER
Prior Authorization Request    Medication: mometasone (NASONEX) 50 MCG/ACT nasal spray     Pharmacy: [id number] - [place], [place] - [address]     Covermymeds?   Y

## 2023-09-14 NOTE — TELEPHONE ENCOUNTER
Central Prior Authorization Team   Phone: [phone]    PA Initiation    Medication: mometasone (NASONEX) 50 MCG/ACT nasal spray  Insurance Company: [employer] ([facility]) - Phone [phone] Fax [phone]  Pharmacy Filling the Rx: [id number] - [place], [place] - [address]  Filling Pharmacy Phone: [phone]  Filling Pharmacy Fax:    Start Date: 9/14/2023

## 2023-09-14 NOTE — TELEPHONE ENCOUNTER
PRIOR AUTHORIZATION DENIED    Medication: mometasone (NASONEX) 50 MCG/ACT nasal spray-PA DENIED     Denial Date: 9/14/2023    Denial Rational: Insurance states that patient must tried/failed 3 drugs: Zetonna, Qnasl, Flunisolide Nasal Soln 25 MCG/ACT (0.025%), Fluticasone Propionate Nasal Susp 50 MCG/ACT.           Appeal Information:

## 2023-10-21 ENCOUNTER — MYC MEDICAL ADVICE (OUTPATIENT)
Dept: FAMILY MEDICINE | Facility: CLINIC | Age: 33
End: 2023-10-21
Payer: COMMERCIAL

## 2023-10-29 ENCOUNTER — MYC REFILL (OUTPATIENT)
Dept: FAMILY MEDICINE | Facility: CLINIC | Age: 33
End: 2023-10-29
Payer: COMMERCIAL

## 2023-10-29 DIAGNOSIS — F98.8 ATTENTION DEFICIT DISORDER (ADD) WITHOUT HYPERACTIVITY: ICD-10-CM

## 2023-10-30 RX ORDER — METHYLPHENIDATE HYDROCHLORIDE 18 MG/1
18 TABLET ORAL EVERY MORNING
Qty: 90 TABLET | Refills: 0 | Status: SHIPPED | OUTPATIENT
Start: 2023-10-30 | End: 2023-12-05

## 2023-11-24 ENCOUNTER — DOCUMENTATION ONLY (OUTPATIENT)
Dept: DERMATOLOGY | Facility: CLINIC | Age: 33
End: 2023-11-24
Payer: COMMERCIAL

## 2023-11-25 NOTE — PROGRESS NOTES
Received refill request for tretinoin 0.025% cream. Pt last seen in clinic with Dr. Peñaloza 12/2022, at which time started on aforementioned cream for acne vulgaris and rhytides. Pt advised to follow up in 12/2024. Refill request approved.

## 2023-12-01 ENCOUNTER — IMMUNIZATION (OUTPATIENT)
Dept: FAMILY MEDICINE | Facility: CLINIC | Age: 33
End: 2023-12-01
Payer: COMMERCIAL

## 2023-12-01 DIAGNOSIS — Z23 NEED FOR PROPHYLACTIC VACCINATION AND INOCULATION AGAINST INFLUENZA: Primary | ICD-10-CM

## 2023-12-01 DIAGNOSIS — Z23 HIGH PRIORITY FOR 2019-NCOV VACCINE: ICD-10-CM

## 2023-12-01 PROCEDURE — 99207 PR NO CHARGE NURSE ONLY: CPT

## 2023-12-01 PROCEDURE — 90471 IMMUNIZATION ADMIN: CPT

## 2023-12-01 PROCEDURE — 90480 ADMN SARSCOV2 VAC 1/ONLY CMP: CPT

## 2023-12-01 PROCEDURE — 90686 IIV4 VACC NO PRSV 0.5 ML IM: CPT

## 2023-12-01 PROCEDURE — 91320 SARSCV2 VAC 30MCG TRS-SUC IM: CPT

## 2023-12-01 NOTE — PROGRESS NOTES
Prior to immunization administration, verified patients identity using patient s name and date of birth. Please see Immunization Activity for additional information.     Screening Questionnaire for Adult Immunization    Are you sick today?   No   Do you have allergies to medications, food, a vaccine component or latex?   No   Have you ever had a serious reaction after receiving a vaccination?   No   Do you have a long-term health problem with heart, lung, kidney, or metabolic disease (e.g., diabetes), asthma, a blood disorder, no spleen, complement component deficiency, a cochlear implant, or a spinal fluid leak?  Are you on long-term aspirin therapy?   No   Do you have cancer, leukemia, HIV/AIDS, or any other immune system problem?   No   Do you have a parent, brother, or sister with an immune system problem?   No   In the past 3 months, have you taken medications that affect  your immune system, such as prednisone, other steroids, or anticancer drugs; drugs for the treatment of rheumatoid arthritis, Crohn s disease, or psoriasis; or have you had radiation treatments?   No   Have you had a seizure, or a brain or other nervous system problem?   No   During the past year, have you received a transfusion of blood or blood    products, or been given immune (gamma) globulin or antiviral drug?   No   For women: Are you pregnant or is there a chance you could become       pregnant during the next month?   No   Have you received any vaccinations in the past 4 weeks?   No     Immunization questionnaire answers were all negative.    I have reviewed the following standing orders:   This patient is due and qualifies for the Covid-19 vaccine.     Click here for COVID-19 Standing Order    I have reviewed the vaccines inclusion and exclusion criteria; No concerns regarding eligibility.     This patient is due and qualifies for the Influenza vaccine.    Click here for Influenza Vaccine Standing Order    I have reviewed the  vaccines inclusion and exclusion criteria; No concerns regarding eligibility.     Patient instructed to remain in clinic for 15 minutes afterwards, and to report any adverse reactions.     Screening performed by Zuleika Garcia MA on 12/1/2023 at 9:09 AM.

## 2023-12-05 ENCOUNTER — MYC REFILL (OUTPATIENT)
Dept: FAMILY MEDICINE | Facility: CLINIC | Age: 33
End: 2023-12-05
Payer: COMMERCIAL

## 2023-12-05 DIAGNOSIS — F98.8 ATTENTION DEFICIT DISORDER (ADD) WITHOUT HYPERACTIVITY: ICD-10-CM

## 2023-12-07 ENCOUNTER — MYC MEDICAL ADVICE (OUTPATIENT)
Dept: FAMILY MEDICINE | Facility: CLINIC | Age: 33
End: 2023-12-07
Payer: COMMERCIAL

## 2023-12-08 RX ORDER — METHYLPHENIDATE HYDROCHLORIDE 18 MG/1
18 TABLET ORAL EVERY MORNING
Qty: 90 TABLET | Refills: 0 | Status: SHIPPED | OUTPATIENT
Start: 2023-12-08 | End: 2024-03-18

## 2024-01-16 NOTE — TELEPHONE ENCOUNTER
Brandi    See pt Mychart message    Jazmyn Connor, RN   [facility]       Spoke to patient about scheduling her mammogram.  She said she will gee us back on a later day to do that.

## 2024-02-05 ENCOUNTER — MYC REFILL (OUTPATIENT)
Dept: FAMILY MEDICINE | Facility: CLINIC | Age: 34
End: 2024-02-05
Payer: COMMERCIAL

## 2024-02-05 DIAGNOSIS — J45.20 MILD INTERMITTENT ASTHMA WITHOUT COMPLICATION: ICD-10-CM

## 2024-02-06 RX ORDER — ALBUTEROL SULFATE 90 UG/1
2 AEROSOL, METERED RESPIRATORY (INHALATION) EVERY 6 HOURS PRN
Qty: 18 G | Refills: 5 | Status: SHIPPED | OUTPATIENT
Start: 2024-02-06

## 2024-03-18 ENCOUNTER — MYC REFILL (OUTPATIENT)
Dept: FAMILY MEDICINE | Facility: CLINIC | Age: 34
End: 2024-03-18
Payer: COMMERCIAL

## 2024-03-18 DIAGNOSIS — F98.8 ATTENTION DEFICIT DISORDER (ADD) WITHOUT HYPERACTIVITY: ICD-10-CM

## 2024-03-19 RX ORDER — METHYLPHENIDATE HYDROCHLORIDE 18 MG/1
18 TABLET ORAL EVERY MORNING
Qty: 90 TABLET | Refills: 0 | Status: SHIPPED | OUTPATIENT
Start: 2024-03-19 | End: 2024-04-05

## 2024-04-03 ASSESSMENT — ASTHMA QUESTIONNAIRES
QUESTION_4 LAST FOUR WEEKS HOW OFTEN HAVE YOU USED YOUR RESCUE INHALER OR NEBULIZER MEDICATION (SUCH AS ALBUTEROL): TWO OR THREE TIMES PER WEEK
ACT_TOTALSCORE: 20
QUESTION_2 LAST FOUR WEEKS HOW OFTEN HAVE YOU HAD SHORTNESS OF BREATH: ONCE OR TWICE A WEEK
QUESTION_1 LAST FOUR WEEKS HOW MUCH OF THE TIME DID YOUR ASTHMA KEEP YOU FROM GETTING AS MUCH DONE AT WORK, SCHOOL OR AT HOME: NONE OF THE TIME
ACT_TOTALSCORE: 20
QUESTION_5 LAST FOUR WEEKS HOW WOULD YOU RATE YOUR ASTHMA CONTROL: SOMEWHAT CONTROLLED
QUESTION_3 LAST FOUR WEEKS HOW OFTEN DID YOUR ASTHMA SYMPTOMS (WHEEZING, COUGHING, SHORTNESS OF BREATH, CHEST TIGHTNESS OR PAIN) WAKE YOU UP AT NIGHT OR EARLIER THAN USUAL IN THE MORNING: NOT AT ALL

## 2024-04-05 ENCOUNTER — OFFICE VISIT (OUTPATIENT)
Dept: FAMILY MEDICINE | Facility: CLINIC | Age: 34
End: 2024-04-05
Payer: COMMERCIAL

## 2024-04-05 VITALS
OXYGEN SATURATION: 100 % | HEIGHT: 68 IN | BODY MASS INDEX: 24.4 KG/M2 | DIASTOLIC BLOOD PRESSURE: 72 MMHG | TEMPERATURE: 98 F | HEART RATE: 77 BPM | WEIGHT: 161 LBS | RESPIRATION RATE: 18 BRPM | SYSTOLIC BLOOD PRESSURE: 122 MMHG

## 2024-04-05 DIAGNOSIS — J30.1 SEASONAL ALLERGIC RHINITIS DUE TO POLLEN: ICD-10-CM

## 2024-04-05 DIAGNOSIS — J45.20 MILD INTERMITTENT ASTHMA WITHOUT COMPLICATION: ICD-10-CM

## 2024-04-05 DIAGNOSIS — J02.9 PHARYNGITIS, UNSPECIFIED ETIOLOGY: Primary | ICD-10-CM

## 2024-04-05 DIAGNOSIS — F98.8 ATTENTION DEFICIT DISORDER (ADD) WITHOUT HYPERACTIVITY: ICD-10-CM

## 2024-04-05 PROCEDURE — 99214 OFFICE O/P EST MOD 30 MIN: CPT | Performed by: NURSE PRACTITIONER

## 2024-04-05 RX ORDER — METHYLPHENIDATE HYDROCHLORIDE 18 MG/1
18 TABLET ORAL EVERY MORNING
Qty: 90 TABLET | Refills: 0 | Status: SHIPPED | OUTPATIENT
Start: 2024-04-05 | End: 2024-05-20

## 2024-04-05 RX ORDER — MONTELUKAST SODIUM 10 MG/1
10 TABLET ORAL AT BEDTIME
Qty: 90 TABLET | Refills: 3 | Status: SHIPPED | OUTPATIENT
Start: 2024-04-05 | End: 2024-06-07

## 2024-04-05 RX ORDER — FLUTICASONE PROPIONATE 50 MCG
1 SPRAY, SUSPENSION (ML) NASAL DAILY
COMMUNITY
Start: 2024-04-05

## 2024-04-05 RX ORDER — FERROUS GLUCONATE 324(38)MG
324 TABLET ORAL EVERY OTHER DAY
COMMUNITY
Start: 2024-04-05

## 2024-04-05 RX ORDER — BUDESONIDE AND FORMOTEROL FUMARATE DIHYDRATE 160; 4.5 UG/1; UG/1
AEROSOL RESPIRATORY (INHALATION)
Qty: 20.4 G | Refills: 11 | Status: SHIPPED | OUTPATIENT
Start: 2024-04-05

## 2024-04-05 ASSESSMENT — PATIENT HEALTH QUESTIONNAIRE - PHQ9
SUM OF ALL RESPONSES TO PHQ QUESTIONS 1-9: 4
SUM OF ALL RESPONSES TO PHQ QUESTIONS 1-9: 4
10. IF YOU CHECKED OFF ANY PROBLEMS, HOW DIFFICULT HAVE THESE PROBLEMS MADE IT FOR YOU TO DO YOUR WORK, TAKE CARE OF THINGS AT HOME, OR GET ALONG WITH OTHER PEOPLE: SOMEWHAT DIFFICULT

## 2024-04-05 NOTE — PROGRESS NOTES
Assessment & Plan     Pharyngitis, unspecified etiology  Symptoms do appear to be seasonal allergy related. Is taking oral antihistamine and nasal steroid and astelin spray already. Discussed adding montelukast - benefits/risks/side effects. Patient will start montelukast in addition to current regimen and adding nasal rinses.  - montelukast (SINGULAIR) 10 MG tablet; Take 1 tablet (10 mg) by mouth at bedtime    Attention deficit disorder (ADD) without hyperactivity  Stable  - methylphenidate HCl ER, OSM, (CONCERTA) 18 MG CR tablet; Take 1 tablet (18 mg) by mouth every morning    Seasonal allergic rhinitis due to pollen  - montelukast (SINGULAIR) 10 MG tablet; Take 1 tablet (10 mg) by mouth at bedtime    Mild intermittent asthma without complication  Stable - continue budesonide  - budesonide-formoterol (SYMBICORT) 160-4.5 MCG/ACT Inhaler; Inhale 2 puffs twice daily plus 1-2 puffs as needed. May use up to 12 puffs per day.    Prescription drug management  I spent a total of 33 minutes on the day of the visit.   Time spent by me doing chart review, history and exam, documentation and further activities per the note        Patient Instructions   Symbicort - take at least once a day for the next month, PLUS additional doses when you have symptoms or exercise  After a month, can reduce to as needed use, but if symptoms return to where they are at now when you stop, then continue to take daily    Allergies  Continue zyrtec, flonase, astelin  Add nasal rinsees  Add montelukast 10 mg      Gulshan Min is a 33 year old, presenting for the following health issues:    Throat Problem (Has a red band going around throat-been there at least a month and a half)        4/5/2024     9:30 AM   Additional Questions   Roomed by Lashell     History of Present Illness       Reason for visit:  Red throat for two months  Symptom onset:  More than a month  Symptom intensity:  Mild  Symptom progression:  Staying the same  Had these  "symptoms before:  No    She eats 2-3 servings of fruits and vegetables daily.She consumes 0 sweetened beverage(s) daily.She exercises with enough effort to increase her heart rate 20 to 29 minutes per day.  She exercises with enough effort to increase her heart rate 4 days per week.   She is taking medications regularly.     Symptoms include   Red, goopy eyes  Congestion  Runny nose  Sneezing  PND  Fluid in ears  Sore throat and throat burns  Clearing throat  Hoarseness  Asthma worse - using inhaler for working out  Weird taste in mouth at time  Warmth in chest at times    Tried oral antihistamines (allegra, claritin and zyretc), Astelin nasal spray in addition to flonase daily. Zyrtec has been helping some. Sneezing has stopped, less runny nose and post-nasal   Started omeprazole two weeks ago to see if it would help -   Mouth ulcers 1.5 months ago - diagnosed       Review of Systems  Constitutional, HEENT, cardiovascular, pulmonary, gi and gu systems are negative, except as otherwise noted.      Objective    /72 (BP Location: Left arm, Patient Position: Sitting, Cuff Size: Adult Regular)   Pulse 77   Temp 98  F (36.7  C) (Temporal)   Resp 18   Ht 1.72 m (5' 7.72\")   Wt 73 kg (161 lb)   LMP 03/20/2024 (Approximate)   SpO2 100%   Breastfeeding No   BMI 24.69 kg/m    Body mass index is 24.69 kg/m .  Physical Exam   GENERAL: alert and no distress  EYES: Eyes grossly normal to inspection, PERRL and conjunctivae and sclerae normal  HENT: normal cephalic/atraumatic, both ears: clear effusion, nose and mouth without ulcers or lesions, nasal mucosa edematous , oropharynx clear, oral mucous membranes moist, and PND  NECK: no adenopathy, no asymmetry, masses, or scars  RESP: lungs clear to auscultation - no rales, rhonchi or wheezes  CV: regular rate and rhythm, normal S1 S2, no S3 or S4, no murmur, click or rub, no peripheral edema   PSYCH: mentation appears normal, affect normal/bright          Signed " Electronically by: ISAIAS Larios CNP

## 2024-04-05 NOTE — PATIENT INSTRUCTIONS
Symbicort - take at least once a day for the next month, PLUS additional doses when you have symptoms or exercise  After a month, can reduce to as needed use, but if symptoms return to where they are at now when you stop, then continue to take daily    Allergies  Continue zyrtec, flonase, astelin  Add nasal rinsees  Add montelukast 10 mg

## 2024-04-18 ENCOUNTER — MYC MEDICAL ADVICE (OUTPATIENT)
Dept: FAMILY MEDICINE | Facility: CLINIC | Age: 34
End: 2024-04-18
Payer: COMMERCIAL

## 2024-04-18 DIAGNOSIS — F98.8 ATTENTION DEFICIT DISORDER (ADD) WITHOUT HYPERACTIVITY: Primary | ICD-10-CM

## 2024-04-19 RX ORDER — METHYLPHENIDATE HYDROCHLORIDE 10 MG/1
10 TABLET ORAL 2 TIMES DAILY
Qty: 60 TABLET | Refills: 0 | Status: SHIPPED | OUTPATIENT
Start: 2024-04-19 | End: 2024-06-07

## 2024-05-02 ENCOUNTER — MYC REFILL (OUTPATIENT)
Dept: FAMILY MEDICINE | Facility: CLINIC | Age: 34
End: 2024-05-02
Payer: COMMERCIAL

## 2024-05-02 DIAGNOSIS — E03.9 HYPOTHYROIDISM, UNSPECIFIED TYPE: ICD-10-CM

## 2024-05-03 RX ORDER — LEVOTHYROXINE SODIUM 50 UG/1
50 TABLET ORAL DAILY
Qty: 90 TABLET | Refills: 3 | Status: SHIPPED | OUTPATIENT
Start: 2024-05-03

## 2024-05-20 ENCOUNTER — MYC REFILL (OUTPATIENT)
Dept: FAMILY MEDICINE | Facility: CLINIC | Age: 34
End: 2024-05-20
Payer: COMMERCIAL

## 2024-05-20 ENCOUNTER — MYC MEDICAL ADVICE (OUTPATIENT)
Dept: FAMILY MEDICINE | Facility: CLINIC | Age: 34
End: 2024-05-20
Payer: COMMERCIAL

## 2024-05-20 DIAGNOSIS — F98.8 ATTENTION DEFICIT DISORDER (ADD) WITHOUT HYPERACTIVITY: ICD-10-CM

## 2024-05-20 RX ORDER — METHYLPHENIDATE HYDROCHLORIDE 18 MG/1
18 TABLET ORAL EVERY MORNING
Qty: 90 TABLET | Refills: 0 | Status: SHIPPED | OUTPATIENT
Start: 2024-05-20 | End: 2024-05-20

## 2024-05-23 RX ORDER — METHYLPHENIDATE HYDROCHLORIDE 18 MG/1
18 TABLET ORAL EVERY MORNING
Qty: 90 TABLET | Refills: 0 | Status: SHIPPED | OUTPATIENT
Start: 2024-05-23 | End: 2024-08-20

## 2024-05-27 ENCOUNTER — MYC MEDICAL ADVICE (OUTPATIENT)
Dept: FAMILY MEDICINE | Facility: CLINIC | Age: 34
End: 2024-05-27
Payer: COMMERCIAL

## 2024-05-27 DIAGNOSIS — G25.81 RESTLESS LEGS SYNDROME (RLS): ICD-10-CM

## 2024-05-27 DIAGNOSIS — Z13.220 LIPID SCREENING: ICD-10-CM

## 2024-05-27 DIAGNOSIS — Z13.1 SCREENING FOR DIABETES MELLITUS: ICD-10-CM

## 2024-05-27 DIAGNOSIS — E03.9 HYPOTHYROIDISM, UNSPECIFIED TYPE: Primary | ICD-10-CM

## 2024-05-28 NOTE — TELEPHONE ENCOUNTER
Please see My chart message. Orders pended. Thanks    Roxanne Humphries RN  [facility]

## 2024-05-31 ENCOUNTER — LAB (OUTPATIENT)
Dept: LAB | Facility: CLINIC | Age: 34
End: 2024-05-31
Payer: COMMERCIAL

## 2024-05-31 DIAGNOSIS — E03.9 HYPOTHYROIDISM, UNSPECIFIED TYPE: ICD-10-CM

## 2024-05-31 DIAGNOSIS — G25.81 RESTLESS LEGS SYNDROME (RLS): ICD-10-CM

## 2024-05-31 DIAGNOSIS — Z13.1 SCREENING FOR DIABETES MELLITUS: ICD-10-CM

## 2024-05-31 DIAGNOSIS — Z13.220 LIPID SCREENING: ICD-10-CM

## 2024-05-31 LAB
BASOPHILS # BLD AUTO: 0 10E3/UL (ref 0–0.2)
BASOPHILS NFR BLD AUTO: 1 %
EOSINOPHIL # BLD AUTO: 0.1 10E3/UL (ref 0–0.7)
EOSINOPHIL NFR BLD AUTO: 2 %
ERYTHROCYTE [DISTWIDTH] IN BLOOD BY AUTOMATED COUNT: 13.5 % (ref 10–15)
HBA1C MFR BLD: 5.3 % (ref 0–5.6)
HCT VFR BLD AUTO: 40.6 % (ref 35–47)
HGB BLD-MCNC: 13.8 G/DL (ref 11.7–15.7)
IMM GRANULOCYTES # BLD: 0 10E3/UL
IMM GRANULOCYTES NFR BLD: 0 %
LYMPHOCYTES # BLD AUTO: 1.9 10E3/UL (ref 0.8–5.3)
LYMPHOCYTES NFR BLD AUTO: 40 %
MCH RBC QN AUTO: 30.9 PG (ref 26.5–33)
MCHC RBC AUTO-ENTMCNC: 34 G/DL (ref 31.5–36.5)
MCV RBC AUTO: 91 FL (ref 78–100)
MONOCYTES # BLD AUTO: 0.5 10E3/UL (ref 0–1.3)
MONOCYTES NFR BLD AUTO: 10 %
NEUTROPHILS # BLD AUTO: 2.3 10E3/UL (ref 1.6–8.3)
NEUTROPHILS NFR BLD AUTO: 48 %
PLATELET # BLD AUTO: 173 10E3/UL (ref 150–450)
RBC # BLD AUTO: 4.47 10E6/UL (ref 3.8–5.2)
WBC # BLD AUTO: 4.7 10E3/UL (ref 4–11)

## 2024-05-31 PROCEDURE — 36415 COLL VENOUS BLD VENIPUNCTURE: CPT

## 2024-05-31 PROCEDURE — 84443 ASSAY THYROID STIM HORMONE: CPT

## 2024-05-31 PROCEDURE — 80053 COMPREHEN METABOLIC PANEL: CPT

## 2024-05-31 PROCEDURE — 80061 LIPID PANEL: CPT

## 2024-05-31 PROCEDURE — 85025 COMPLETE CBC W/AUTO DIFF WBC: CPT

## 2024-05-31 PROCEDURE — 83036 HEMOGLOBIN GLYCOSYLATED A1C: CPT

## 2024-06-01 LAB
ALBUMIN SERPL BCG-MCNC: 4.7 G/DL (ref 3.5–5.2)
ALP SERPL-CCNC: 37 U/L (ref 40–150)
ALT SERPL W P-5'-P-CCNC: 25 U/L (ref 0–50)
ANION GAP SERPL CALCULATED.3IONS-SCNC: 9 MMOL/L (ref 7–15)
AST SERPL W P-5'-P-CCNC: 20 U/L (ref 0–45)
BILIRUB SERPL-MCNC: 0.5 MG/DL
BUN SERPL-MCNC: 13.6 MG/DL (ref 6–20)
CALCIUM SERPL-MCNC: 9.4 MG/DL (ref 8.6–10)
CHLORIDE SERPL-SCNC: 106 MMOL/L (ref 98–107)
CHOLEST SERPL-MCNC: 154 MG/DL
CREAT SERPL-MCNC: 0.95 MG/DL (ref 0.51–0.95)
DEPRECATED HCO3 PLAS-SCNC: 25 MMOL/L (ref 22–29)
EGFRCR SERPLBLD CKD-EPI 2021: 80 ML/MIN/1.73M2
FASTING STATUS PATIENT QL REPORTED: YES
FASTING STATUS PATIENT QL REPORTED: YES
GLUCOSE SERPL-MCNC: 86 MG/DL (ref 70–99)
HDLC SERPL-MCNC: 44 MG/DL
LDLC SERPL CALC-MCNC: 97 MG/DL
NONHDLC SERPL-MCNC: 110 MG/DL
POTASSIUM SERPL-SCNC: 4.1 MMOL/L (ref 3.4–5.3)
PROT SERPL-MCNC: 6.6 G/DL (ref 6.4–8.3)
SODIUM SERPL-SCNC: 140 MMOL/L (ref 135–145)
TRIGL SERPL-MCNC: 63 MG/DL
TSH SERPL DL<=0.005 MIU/L-ACNC: 2.28 UIU/ML (ref 0.3–4.2)

## 2024-06-05 SDOH — HEALTH STABILITY: PHYSICAL HEALTH: ON AVERAGE, HOW MANY DAYS PER WEEK DO YOU ENGAGE IN MODERATE TO STRENUOUS EXERCISE (LIKE A BRISK WALK)?: 4 DAYS

## 2024-06-05 SDOH — HEALTH STABILITY: PHYSICAL HEALTH: ON AVERAGE, HOW MANY MINUTES DO YOU ENGAGE IN EXERCISE AT THIS LEVEL?: 30 MIN

## 2024-06-05 ASSESSMENT — SOCIAL DETERMINANTS OF HEALTH (SDOH): HOW OFTEN DO YOU GET TOGETHER WITH FRIENDS OR RELATIVES?: ONCE A WEEK

## 2024-06-07 ENCOUNTER — OFFICE VISIT (OUTPATIENT)
Dept: FAMILY MEDICINE | Facility: CLINIC | Age: 34
End: 2024-06-07
Payer: COMMERCIAL

## 2024-06-07 VITALS
DIASTOLIC BLOOD PRESSURE: 81 MMHG | HEIGHT: 68 IN | OXYGEN SATURATION: 100 % | WEIGHT: 156 LBS | BODY MASS INDEX: 23.64 KG/M2 | SYSTOLIC BLOOD PRESSURE: 137 MMHG | TEMPERATURE: 97.3 F | RESPIRATION RATE: 10 BRPM | HEART RATE: 73 BPM

## 2024-06-07 DIAGNOSIS — F98.8 ATTENTION DEFICIT DISORDER (ADD) WITHOUT HYPERACTIVITY: ICD-10-CM

## 2024-06-07 DIAGNOSIS — Z30.011 ENCOUNTER FOR INITIAL PRESCRIPTION OF CONTRACEPTIVE PILLS: ICD-10-CM

## 2024-06-07 DIAGNOSIS — J45.20 MILD INTERMITTENT ASTHMA WITHOUT COMPLICATION: ICD-10-CM

## 2024-06-07 DIAGNOSIS — J02.9 PHARYNGITIS, UNSPECIFIED ETIOLOGY: ICD-10-CM

## 2024-06-07 DIAGNOSIS — F33.1 MAJOR DEPRESSIVE DISORDER, RECURRENT EPISODE, MODERATE (H): ICD-10-CM

## 2024-06-07 DIAGNOSIS — J30.1 SEASONAL ALLERGIC RHINITIS DUE TO POLLEN: ICD-10-CM

## 2024-06-07 DIAGNOSIS — F32.5 MAJOR DEPRESSIVE DISORDER, SINGLE EPISODE, IN REMISSION (H): ICD-10-CM

## 2024-06-07 DIAGNOSIS — Z30.41 SURVEILLANCE FOR BIRTH CONTROL, ORAL CONTRACEPTIVES: ICD-10-CM

## 2024-06-07 DIAGNOSIS — Z00.00 ROUTINE GENERAL MEDICAL EXAMINATION AT A HEALTH CARE FACILITY: Primary | ICD-10-CM

## 2024-06-07 PROCEDURE — 99214 OFFICE O/P EST MOD 30 MIN: CPT | Mod: 25 | Performed by: NURSE PRACTITIONER

## 2024-06-07 PROCEDURE — 90677 PCV20 VACCINE IM: CPT | Performed by: NURSE PRACTITIONER

## 2024-06-07 PROCEDURE — 90471 IMMUNIZATION ADMIN: CPT | Performed by: NURSE PRACTITIONER

## 2024-06-07 PROCEDURE — 99395 PREV VISIT EST AGE 18-39: CPT | Performed by: NURSE PRACTITIONER

## 2024-06-07 RX ORDER — MONTELUKAST SODIUM 10 MG/1
10 TABLET ORAL AT BEDTIME
Qty: 90 TABLET | Refills: 3 | Status: SHIPPED | OUTPATIENT
Start: 2024-06-07

## 2024-06-07 ASSESSMENT — PAIN SCALES - GENERAL: PAINLEVEL: NO PAIN (0)

## 2024-06-07 NOTE — PROGRESS NOTES
Preventive Care Visit  [facility]  ISAIAS Larios CNP, Nurse Practitioner - Family  Jun 7, 2024      Assessment & Plan     Routine general medical examination at a health care facility  Reviewed and updated health maintenance and recommendations    Pharyngitis, unspecified etiology  Continue current plan  - montelukast (SINGULAIR) 10 MG tablet; Take 1 tablet (10 mg) by mouth at bedtime    Seasonal allergic rhinitis due to pollen  Not getting full relief from first and second line measures for allergies. Recommend consult with Allergist  - montelukast (SINGULAIR) 10 MG tablet; Take 1 tablet (10 mg) by mouth at bedtime  - Adult Allergy/Asthma  Referral; Future    Major depressive disorder, recurrent episode, moderate (H)  Stable without medication. Exercising a lot. No therapy currently    Major depressive disorder, single episode, in remission (H24)  As above    Mild intermittent asthma without complication  Asthma ok control. Worse with allergies not being controlled. See specialist      10/6/2022     7:38 AM 6/5/2023     9:36 AM 4/3/2024     9:29 AM   ACT Total Scores   ACT TOTAL SCORE (Goal Greater than or Equal to 20) 23 25 20   In the past 12 months, how many times did you visit the emergency room for your asthma without being admitted to the hospital? 0 0 0   In the past 12 months, how many times were you hospitalized overnight because of your asthma? 0 0 0       - Adult Allergy/Asthma  Referral; Future    Attention deficit disorder (ADD) without hyperactivity  Returned to taking Concerta 18 mg with coupon available    Encounter for initial prescription of contraceptive pills  Not initial    Surveillance for birth control, oral contraceptives  Trial oral contraceptive that has more androgenic activity for improvement in libido  - norgestim-eth estrad triphasic (ORTHO TRI-CYCLEN LO) 0.18/0.215/0.25 MG-25 MCG tablet; Take 1 tablet by mouth  "daily    Patient has been advised of split billing requirements and indicates understanding: Yes  Prescription drug management        Counseling  Appropriate preventive services were discussed with this patient, including applicable screening as appropriate for fall prevention, nutrition, physical activity, Tobacco-use cessation, weight loss and cognition.  Checklist reviewing preventive services available has been given to the patient.      Patient Instructions   Magnesium glycinate 400-600 mg nightly    HDL - exercise and omega-3-fatty acids    Advanced Care Directive resources  Consider making an advanced care directive - this is a good site for assistance and resources   https://www.lightAirgainlegacy.org      1.\"Eat food.  Not too much.  Mostly plants\" - [place] Pollen - see link below  - Aim for 5-7 servings of vegetables (raw vegetables - 1 serving = 1/2 cup; raw greens - 1 serving = 1 cup)   - Eat grains, but don't make them the superstar of your meal and DO make them whole grains  2. AVOID sugar.  There is no nutritional benefit to sugar.  3. Drink lots of water (avoid juice and soda)  4. MOVE your body daily - even if some days this means 5 minutes of movement. Walking is great for your bones and brain.  Do aim for 150 minutes per week of activity that raises your heart rate, but \"don't let the ideal get in the way of the good enough\"  5. Get good sleep (6-8 hours/night- less sleep is associated with disease/illness/obesity)   6. Smile and laugh every day - even in the face of tragedy  7. Start a meditation or mindfulness practice    CALCIUM: The average recommended intake for women is 9069-8828 mg calcium daily. It is best to get this from your diet (Milk, yogurt, and cheese, vegetables such as Chinese cabbage, kale, spinach and broccoli). If you are not eating enough calcium, then I recommend Calcium Citrate/vitD supplements daily.     Vitamin D is an essential nutritient that many [place] lack, " "especially in the winter. It is vital for healthy immune system functioning and can be linked to diseases such as obesity, diabetes, body aches/pain, etc. It is super safe and highly beneficial for a [place] to take a vitamin D3 2000 IU once daily during winter months. Daily vitamin D for life is recommended for anyone who has ever been found deficient.    Other things to consider: do not drive while under the influence of alcohol, do not drive while texting, always wear a seatbelt, wear a helmet when biking, wear sunscreen to help prevent skin cancer, use DEET mosquito spray when outdoors to prevent mosquito and tick bites, & avoid smoking. If you do get bit by a DEER tick, please contact my office immediately to consider lyme prophylaxis. Dental visits recommended every 6-12 months.    [place] Frank's 7 Rules for Eating  https://www.[employer].Green & Grow/food-recipes/news/77184235/7-rules-for-eating#1    My typical clinic hours are as follows:  Monday 12-5 pm  Tuesday 8am-3pm  Wednesday 7:20am-1pm  Thursday virtual appts 8:20am-12:20 pm  Friday 8-11 am  If you need an appointment urgently and do not find one available on [email or web address] or with Central scheduling, please send me a [email or web address] message and I will work to get you scheduled with myself or a colleague here     Clinic notes  Lab and imaging results are now available for you to view immediately on completion.  Please know that I often have not seen the result before you see results.  I will interpret and discuss the results and meaning of the results as soon as I am able to review them.   [email or web address] is the best way to reach the clinic directly.  When you send a [email or web address] message this will be read by our clinic RNs.  Please know that when you call the clinic number, you are not speaking to a staff member from our local clinic.  You can ask that staff to speak to a clinic staff directly if you wish.  You will be able to read my documentation (\"provider notes\") when I finish up and " close your chart.  I encourage you to read through to understand your diagnosis and the plan and how we arrived there.  It is also an opportunity to make sure I fully understood your concerns.      Gulshan Min is a 33 year old, presenting for the following:  Physical        6/7/2024     9:00 AM   Additional Questions   Roomed by Megan GODINEZ        Health Care Directive  Patient does not have a Health Care Directive or Living Will: Discussed advance care planning with patient; information given to patient to review.    HPI    Asthma and allergies  Allergies are better, but not well controlled. Asthma is similar. Carrying inhaler around with her. Needing inhalers for running (back to running since first pregnancy!).  Nose gets visibly swollen, feels less air, itchy nose, PND and goopy eyes.   Allergies - singulair, cetirizine (took double cetirizine last week - thinks it helped), flonase. Azelastine leaked too much and not using this.  Asthma - taking budesonide almost every day and occasionally twice.     ADHD  Methylphenidate 10 mg didn't work - crashed at 4 pm, needed to take another to avoid crash, but got dry mouth and jittery with the double dose. Found a coupon for Concerta and so back to taking 18 mg    Birth control  Some periods are easy and some are not. Last period had very light bleeding and no PMS symptoms, but was very tired. Some are much more PMS symptoms. Tried a lot of different COCs in teens and has been on this current Lo/Ovral for a long time.  had a vasectomy so mainly looking for PMS control.         10/6/2022     7:38 AM 6/5/2023     9:36 AM 4/3/2024     9:29 AM   ACT Total Scores   ACT TOTAL SCORE (Goal Greater than or Equal to 20) 23 25 20   In the past 12 months, how many times did you visit the emergency room for your asthma without being admitted to the hospital? 0 0 0   In the past 12 months, how many times were you hospitalized overnight because of your asthma? 0 0 0              6/5/2024   General Health   How would you rate your overall physical health? (!) FAIR   Feel stress (tense, anxious, or unable to sleep) Not at all         6/5/2024   Nutrition   Three or more servings of calcium each day? (!) I DON'T KNOW   Diet: Regular (no restrictions)   How many servings of fruit and vegetables per day? (!) 2-3   How many sweetened beverages each day? 0-1         6/5/2024   Exercise   Days per week of moderate/strenous exercise 4 days   Average minutes spent exercising at this level 30 min         6/5/2024   Social Factors   Frequency of gathering with friends or relatives Once a week   Worry food won't last until get money to buy more No   Food not last or not have enough money for food? No   Do you have housing?  Yes   Are you worried about losing your housing? No   Lack of transportation? No   Unable to get utilities (heat,electricity)? No         6/5/2024   Dental   Dentist two times every year? Yes         6/5/2024   TB Screening   Were you born outside of the [place]? No         Today's PHQ-9 Score:       4/5/2024     8:22 AM   PHQ-9 SCORE   PHQ-9 Total Score MyChart 4 (Minimal depression)   PHQ-9 Total Score 4           6/5/2024   Substance Use   Alcohol more than 3/day or more than 7/wk No   Do you use any other substances recreationally? No     Social History     Tobacco Use    Smoking status: Never    Smokeless tobacco: Never   Vaping Use    Vaping status: Never Used   Substance Use Topics    Alcohol use: Yes     Alcohol/week: 1.0 standard drink of alcohol     Types: 1 Standard drinks or equivalent per week     Comment: up to twice a week at most    Drug use: Never          Mammogram Screening - Patient under 40 years of age: Routine Mammogram Screening not recommended.         6/5/2024   STI Screening   New sexual partner(s) since last STI/HIV test? No     History of abnormal Pap smear: No - age 30- 64 PAP with HPV every 5 years recommended        Latest Ref Rng & Units 3/8/2021     8:50  "AM 3/5/2021     4:30 PM 10/13/2017    12:00 AM   PAP / HPV   PAP (Historical)  NIL      HPV 16 DNA NEG^Negative  Negative     HPV 18 DNA NEG^Negative  Negative     Other HR HPV NEG^Negative  Negative     PAP-ABSTRACT    See Scanned Document           This result is from an external source.           2024   Contraception/Family Planning   Questions about contraception or family planning (!) YES         Reviewed and updated as needed this visit by Provider   Tobacco   Meds   Med Hx   Fam Hx  Soc Hx Sexual Activity          Past Medical History:   Diagnosis Date    Depressive disorder     Thyroid disease     Uncomplicated asthma      Past Surgical History:   Procedure Laterality Date     SECTION N/A 2019    Procedure:  SECTION;  Surgeon: Zuri Cisneros MD;  Location: [facility] L+D    wisdom teeth      ZZC EXCISION OF GANGLION CYST FOREARM Left 2006    wrist         Review of Systems  Constitutional, neuro, ENT, endocrine, pulmonary, cardiac, gastrointestinal, genitourinary, musculoskeletal, integument and psychiatric systems are negative, except as otherwise noted.     Objective    Exam  /81 (BP Location: Right arm, Patient Position: Sitting, Cuff Size: Adult Regular)   Pulse 73   Temp 97.3  F (36.3  C) (Temporal)   Resp 10   Ht 1.723 m (5' 7.84\")   Wt 70.8 kg (156 lb)   LMP 2024 (Approximate)   SpO2 100%   BMI 23.84 kg/m     Estimated body mass index is 23.84 kg/m  as calculated from the following:    Height as of this encounter: 1.723 m (5' 7.84\").    Weight as of this encounter: 70.8 kg (156 lb).    Physical Exam  GENERAL: alert and no distress  EYES: Eyes grossly normal to inspection, PERRL and conjunctivae and sclerae normal  HENT: ear canals and TM's normal, nose and mouth without ulcers or lesions  NECK: no adenopathy, no asymmetry, masses, or scars  RESP: lungs clear to auscultation - no rales, rhonchi or wheezes  BREAST: normal without masses, tenderness " or nipple discharge and no palpable axillary masses or adenopathy  CV: regular rate and rhythm, normal S1 S2, no S3 or S4, no murmur, click or rub, no peripheral edema  ABDOMEN: soft, nontender, no hepatosplenomegaly, no masses and bowel sounds normal  MS: no gross musculoskeletal defects noted, no edema  SKIN: no suspicious lesions or rashes  NEURO: Normal strength and tone, mentation intact and speech normal  PSYCH: mentation appears normal, affect normal/bright        Signed Electronically by: ISAIAS Larios CNP

## 2024-06-07 NOTE — PATIENT INSTRUCTIONS
"Magnesium glycinate 400-600 mg nightly    HDL - exercise and omega-3-fatty acids    Advanced Care Directive resources  Consider making an advanced care directive - this is a good site for assistance and resources   https://www.lightThe Loadowny.org      1.\"Eat food.  Not too much.  Mostly plants\" - [place] Pollen - see link below  - Aim for 5-7 servings of vegetables (raw vegetables - 1 serving = 1/2 cup; raw greens - 1 serving = 1 cup)   - Eat grains, but don't make them the superstar of your meal and DO make them whole grains  2. AVOID sugar.  There is no nutritional benefit to sugar.  3. Drink lots of water (avoid juice and soda)  4. MOVE your body daily - even if some days this means 5 minutes of movement. Walking is great for your bones and brain.  Do aim for 150 minutes per week of activity that raises your heart rate, but \"don't let the ideal get in the way of the good enough\"  5. Get good sleep (6-8 hours/night- less sleep is associated with disease/illness/obesity)   6. Smile and laugh every day - even in the face of tragedy  7. Start a meditation or mindfulness practice    CALCIUM: The average recommended intake for women is 9294-1312 mg calcium daily. It is best to get this from your diet (Milk, yogurt, and cheese, vegetables such as Chinese cabbage, kale, spinach and broccoli). If you are not eating enough calcium, then I recommend Calcium Citrate/vitD supplements daily.     Vitamin D is an essential nutritient that many [place] lack, especially in the winter. It is vital for healthy immune system functioning and can be linked to diseases such as obesity, diabetes, body aches/pain, etc. It is super safe and highly beneficial for a [place] to take a vitamin D3 2000 IU once daily during winter months. Daily vitamin D for life is recommended for anyone who has ever been found deficient.    Other things to consider: do not drive while under the influence of alcohol, do not drive while texting, always " "wear a seatbelt, wear a helmet when biking, wear sunscreen to help prevent skin cancer, use DEET mosquito spray when outdoors to prevent mosquito and tick bites, & avoid smoking. If you do get bit by a DEER tick, please contact my office immediately to consider lyme prophylaxis. Dental visits recommended every 6-12 months.    [place] Frank's 7 Rules for Eating  https://www.[employer].SemaConnect/food-recipes/news/32131459/7-rules-for-eating#1    My typical clinic hours are as follows:  Monday 12-5 pm  Tuesday 8am-3pm  Wednesday 7:20am-1pm  Thursday virtual appts 8:20am-12:20 pm  Friday 8-11 am  If you need an appointment urgently and do not find one available on Granular or with Central scheduling, please send me a Granular message and I will work to get you scheduled with myself or a colleague here     Clinic notes  Lab and imaging results are now available for you to view immediately on completion.  Please know that I often have not seen the result before you see results.  I will interpret and discuss the results and meaning of the results as soon as I am able to review them.   Granular is the best way to reach the clinic directly.  When you send a Granular message this will be read by our clinic RNs.  Please know that when you call the clinic number, you are not speaking to a staff member from our local clinic.  You can ask that staff to speak to a clinic staff directly if you wish.  You will be able to read my documentation (\"provider notes\") when I finish up and close your chart.  I encourage you to read through to understand your diagnosis and the plan and how we arrived there.  It is also an opportunity to make sure I fully understood your concerns.    "

## 2024-06-11 ENCOUNTER — MYC MEDICAL ADVICE (OUTPATIENT)
Dept: FAMILY MEDICINE | Facility: CLINIC | Age: 34
End: 2024-06-11
Payer: COMMERCIAL

## 2024-06-12 RX ORDER — NORGESTIMATE AND ETHINYL ESTRADIOL 7DAYSX3 28
1 KIT ORAL DAILY
Qty: 84 TABLET | Refills: 4 | Status: SHIPPED | OUTPATIENT
Start: 2024-06-12 | End: 2024-06-12

## 2024-06-12 RX ORDER — NORGESTIMATE AND ETHINYL ESTRADIOL 7DAYSX3 LO
1 KIT ORAL DAILY
Qty: 84 TABLET | Refills: 4 | Status: SHIPPED | OUTPATIENT
Start: 2024-06-12

## 2024-08-20 ENCOUNTER — TELEPHONE (OUTPATIENT)
Dept: FAMILY MEDICINE | Facility: CLINIC | Age: 34
End: 2024-08-20
Payer: COMMERCIAL

## 2024-08-20 ENCOUNTER — MYC REFILL (OUTPATIENT)
Dept: FAMILY MEDICINE | Facility: CLINIC | Age: 34
End: 2024-08-20
Payer: COMMERCIAL

## 2024-08-20 DIAGNOSIS — F98.8 ATTENTION DEFICIT DISORDER (ADD) WITHOUT HYPERACTIVITY: ICD-10-CM

## 2024-08-20 RX ORDER — METHYLPHENIDATE HYDROCHLORIDE 18 MG/1
18 TABLET ORAL EVERY MORNING
Qty: 90 TABLET | Refills: 0 | Status: SHIPPED | OUTPATIENT
Start: 2024-08-20

## 2024-08-21 NOTE — TELEPHONE ENCOUNTER
PA Initiation    Medication: SYMBICORT 160-4.5 MCG/ACT IN AERO  Insurance Company: [employer] ([facility]) - Phone [phone] Fax [phone]  Pharmacy Filling the Rx: [id number] - [place], [place] - [address]  Filling Pharmacy Phone: [phone]  Filling Pharmacy Fax: [phone]  Start Date: 8/21/2024

## 2024-08-21 NOTE — TELEPHONE ENCOUNTER
Prior Authorization Approval    Medication: SYMBICORT 160-4.5 MCG/ACT IN AERO  Authorization Effective Date: 8/21/2024  Authorization Expiration Date: 2/21/2025  Approved Dose/Quantity:   Reference #:     Insurance Company: [employer] ([facility]) - Phone [phone] Fax [phone]  Expected CoPay: $    CoPay Card Available:      Financial Assistance Needed:   Which Pharmacy is filling the prescription: [id number] - [place], [place] - [address]  Pharmacy Notified: YES  Patient Notified: **Instructed pharmacy to notify patient when script is ready to /ship.**

## 2024-10-18 ENCOUNTER — ALLIED HEALTH/NURSE VISIT (OUTPATIENT)
Dept: FAMILY MEDICINE | Facility: CLINIC | Age: 34
End: 2024-10-18
Payer: COMMERCIAL

## 2024-10-18 DIAGNOSIS — Z23 ENCOUNTER FOR IMMUNIZATION: Primary | ICD-10-CM

## 2024-10-18 PROCEDURE — 99207 PR NO CHARGE NURSE ONLY: CPT

## 2024-10-18 PROCEDURE — 90480 ADMN SARSCOV2 VAC 1/ONLY CMP: CPT

## 2024-10-18 PROCEDURE — 91320 SARSCV2 VAC 30MCG TRS-SUC IM: CPT

## 2024-10-18 PROCEDURE — 90471 IMMUNIZATION ADMIN: CPT

## 2024-10-18 PROCEDURE — 90656 IIV3 VACC NO PRSV 0.5 ML IM: CPT

## 2024-11-21 ENCOUNTER — MYC REFILL (OUTPATIENT)
Dept: FAMILY MEDICINE | Facility: CLINIC | Age: 34
End: 2024-11-21
Payer: COMMERCIAL

## 2024-11-21 DIAGNOSIS — F98.8 ATTENTION DEFICIT DISORDER (ADD) WITHOUT HYPERACTIVITY: ICD-10-CM

## 2024-11-21 RX ORDER — METHYLPHENIDATE HYDROCHLORIDE 18 MG/1
18 TABLET ORAL EVERY MORNING
Qty: 90 TABLET | Refills: 0 | Status: CANCELLED | OUTPATIENT
Start: 2024-11-21

## 2024-11-22 NOTE — TELEPHONE ENCOUNTER
Pt unsure what that is - hasn't done it before. Transferred to triage for possible bridge so there is no gap in meds until e visit is completed

## 2024-11-22 NOTE — TELEPHONE ENCOUNTER
Pt calling stating she has never had to do a 6 month check up before and does not know why she has never had to come in for an appointment before. I did advise can be virtual visit but she states she can not get in before medication would be out and is very confused as to why she has to do this.    Please advise,    Thanks!  Greyson BRUNO RN   [facility]

## 2024-11-25 RX ORDER — METHYLPHENIDATE HYDROCHLORIDE 18 MG/1
18 TABLET ORAL EVERY MORNING
Qty: 90 TABLET | Refills: 0 | Status: SHIPPED | OUTPATIENT
Start: 2024-11-25

## 2024-11-25 NOTE — TELEPHONE ENCOUNTER
This is just the standard of care. I forgot we had made an agreement at her physical. KARLY Child

## 2025-01-23 DIAGNOSIS — F41.9 ANXIETY: ICD-10-CM

## 2025-01-23 RX ORDER — PROPRANOLOL HCL 20 MG
20 TABLET ORAL 3 TIMES DAILY PRN
Qty: 90 TABLET | Refills: 3 | Status: SHIPPED | OUTPATIENT
Start: 2025-01-23

## 2025-01-23 NOTE — TELEPHONE ENCOUNTER
Clinic RN: Please investigate patient's chart or contact patient if the information cannot be found because patient should have run out of this medication on 6/6/24. Confirm patient is taking this medication as prescribed. Document findings and route refill encounter to provider for approval or denial.    Cara BHATT, RN, PHN

## 2025-04-11 ENCOUNTER — TRANSFERRED RECORDS (OUTPATIENT)
Dept: HEALTH INFORMATION MANAGEMENT | Facility: CLINIC | Age: 35
End: 2025-04-11
Payer: COMMERCIAL

## 2025-04-22 DIAGNOSIS — E03.9 HYPOTHYROIDISM, UNSPECIFIED TYPE: ICD-10-CM

## 2025-04-22 RX ORDER — LEVOTHYROXINE SODIUM 50 UG/1
50 TABLET ORAL DAILY
Qty: 90 TABLET | Refills: 0 | Status: SHIPPED | OUTPATIENT
Start: 2025-04-22

## 2025-04-23 ENCOUNTER — MYC REFILL (OUTPATIENT)
Dept: FAMILY MEDICINE | Facility: CLINIC | Age: 35
End: 2025-04-23
Payer: COMMERCIAL

## 2025-04-23 DIAGNOSIS — E03.9 HYPOTHYROIDISM, UNSPECIFIED TYPE: ICD-10-CM

## 2025-04-24 RX ORDER — LEVOTHYROXINE SODIUM 50 UG/1
50 TABLET ORAL DAILY
Qty: 90 TABLET | Refills: 0 | OUTPATIENT
Start: 2025-04-24

## 2025-05-26 ENCOUNTER — MYC REFILL (OUTPATIENT)
Dept: FAMILY MEDICINE | Facility: CLINIC | Age: 35
End: 2025-05-26
Payer: COMMERCIAL

## 2025-05-26 DIAGNOSIS — F98.8 ATTENTION DEFICIT DISORDER (ADD) WITHOUT HYPERACTIVITY: ICD-10-CM

## 2025-05-27 RX ORDER — METHYLPHENIDATE HYDROCHLORIDE 18 MG/1
18 TABLET ORAL EVERY MORNING
Qty: 90 TABLET | Refills: 0 | Status: SHIPPED | OUTPATIENT
Start: 2025-05-27

## 2025-06-10 ENCOUNTER — PATIENT OUTREACH (OUTPATIENT)
Dept: CARE COORDINATION | Facility: CLINIC | Age: 35
End: 2025-06-10
Payer: COMMERCIAL

## 2025-06-30 ENCOUNTER — RESULTS FOLLOW-UP (OUTPATIENT)
Dept: FAMILY MEDICINE | Facility: CLINIC | Age: 35
End: 2025-06-30

## 2025-07-20 ENCOUNTER — MYC REFILL (OUTPATIENT)
Dept: FAMILY MEDICINE | Facility: CLINIC | Age: 35
End: 2025-07-20
Payer: COMMERCIAL

## 2025-07-20 DIAGNOSIS — E03.9 HYPOTHYROIDISM, UNSPECIFIED TYPE: ICD-10-CM

## 2025-07-21 RX ORDER — LEVOTHYROXINE SODIUM 50 UG/1
50 TABLET ORAL DAILY
Qty: 90 TABLET | Refills: 3 | OUTPATIENT
Start: 2025-07-21

## 2025-08-04 RX ORDER — NORETHINDRONE ACETATE AND ETHINYL ESTRADIOL 1MG-20(21)
1 KIT ORAL DAILY
Qty: 84 TABLET | Status: CANCELLED | OUTPATIENT
Start: 2025-08-04

## 2025-08-12 ENCOUNTER — MYC MEDICAL ADVICE (OUTPATIENT)
Dept: FAMILY MEDICINE | Facility: CLINIC | Age: 35
End: 2025-08-12
Payer: COMMERCIAL

## 2025-08-14 DIAGNOSIS — Z30.41 SURVEILLANCE FOR BIRTH CONTROL, ORAL CONTRACEPTIVES: ICD-10-CM

## 2025-08-14 RX ORDER — NORGESTIMATE AND ETHINYL ESTRADIOL 7DAYSX3 LO
1 KIT ORAL DAILY
Qty: 84 TABLET | Refills: 2 | Status: SHIPPED | OUTPATIENT
Start: 2025-08-14

## 2025-08-25 ENCOUNTER — MYC REFILL (OUTPATIENT)
Dept: FAMILY MEDICINE | Facility: CLINIC | Age: 35
End: 2025-08-25
Payer: COMMERCIAL

## 2025-08-25 DIAGNOSIS — F90.0 ATTENTION DEFICIT HYPERACTIVITY DISORDER (ADHD), PREDOMINANTLY INATTENTIVE TYPE: ICD-10-CM

## 2025-08-26 RX ORDER — METHYLPHENIDATE HYDROCHLORIDE 18 MG/1
18 TABLET ORAL EVERY MORNING
Qty: 90 TABLET | Refills: 0 | Status: SHIPPED | OUTPATIENT
Start: 2025-08-26

## (undated) DEVICE — SU PLAIN 3-0 CTX 27" 873H

## (undated) DEVICE — STOCKING SLEEVE COMPRESSION CALF MED

## (undated) DEVICE — GLOVE ESTEEM POWDER FREE SMT 6.5  2D72PT65

## (undated) DEVICE — CATH TRAY FOLEY 16FR BARDEX W/DRAIN BAG STATLOCK 300316A

## (undated) DEVICE — BASIN SET MAJOR

## (undated) DEVICE — STRAP KNEE/BODY 31143004

## (undated) DEVICE — SU MONOCRYL 4-0 PS-2 18" UND Y496G

## (undated) DEVICE — SOL WATER IRRIG 1000ML BOTTLE 07139-09

## (undated) DEVICE — SU PLAIN 0 TIE 54" S104H

## (undated) DEVICE — PACK C-SECTION LF PL15OTA83B

## (undated) DEVICE — ESU PENCIL W/HOLSTER

## (undated) DEVICE — PREP CHLORAPREP 26ML TINTED ORANGE  260815

## (undated) DEVICE — GLOVE PROTEXIS BLUE W/NEU-THERA 6.5  2D73EB65

## (undated) DEVICE — SUCTION CANISTER MEDIVAC LINER 1500ML W/LID 65651-515

## (undated) DEVICE — SU VICRYL 0 CT-1 36" J346H

## (undated) DEVICE — ESU GROUND PAD UNIVERSAL W/O CORD

## (undated) DEVICE — SOL NACL 0.9% IRRIG 1000ML BOTTLE 07138-09

## (undated) RX ORDER — FENTANYL CITRATE 50 UG/ML
INJECTION, SOLUTION INTRAMUSCULAR; INTRAVENOUS
Status: DISPENSED
Start: 2019-11-19

## (undated) RX ORDER — NALBUPHINE HYDROCHLORIDE 10 MG/ML
INJECTION, SOLUTION INTRAMUSCULAR; INTRAVENOUS; SUBCUTANEOUS
Status: DISPENSED
Start: 2019-11-19

## (undated) RX ORDER — MORPHINE SULFATE 1 MG/ML
INJECTION, SOLUTION EPIDURAL; INTRATHECAL; INTRAVENOUS
Status: DISPENSED
Start: 2019-11-19